# Patient Record
Sex: FEMALE | Race: WHITE | NOT HISPANIC OR LATINO | Employment: OTHER | URBAN - METROPOLITAN AREA
[De-identification: names, ages, dates, MRNs, and addresses within clinical notes are randomized per-mention and may not be internally consistent; named-entity substitution may affect disease eponyms.]

---

## 2017-02-23 ENCOUNTER — OFFICE VISIT (OUTPATIENT)
Dept: FAMILY MEDICINE | Facility: CLINIC | Age: 77
End: 2017-02-23
Payer: COMMERCIAL

## 2017-02-23 VITALS
TEMPERATURE: 96.8 F | SYSTOLIC BLOOD PRESSURE: 137 MMHG | BODY MASS INDEX: 26.37 KG/M2 | RESPIRATION RATE: 18 BRPM | DIASTOLIC BLOOD PRESSURE: 68 MMHG | WEIGHT: 168 LBS | OXYGEN SATURATION: 99 % | HEIGHT: 67 IN | HEART RATE: 93 BPM

## 2017-02-23 DIAGNOSIS — Z23 NEED FOR VACCINATION: ICD-10-CM

## 2017-02-23 DIAGNOSIS — I10 BENIGN ESSENTIAL HYPERTENSION: ICD-10-CM

## 2017-02-23 DIAGNOSIS — E78.5 HYPERLIPIDEMIA LDL GOAL <130: ICD-10-CM

## 2017-02-23 DIAGNOSIS — Z12.11 SCREENING FOR COLON CANCER: ICD-10-CM

## 2017-02-23 DIAGNOSIS — R42 DIZZINESS: ICD-10-CM

## 2017-02-23 DIAGNOSIS — R25.2 CRAMP OF LIMB: Primary | ICD-10-CM

## 2017-02-23 PROBLEM — R01.1 HEART MURMUR: Status: ACTIVE | Noted: 2017-02-23

## 2017-02-23 PROBLEM — R60.0 LOCALIZED EDEMA: Status: ACTIVE | Noted: 2017-02-23

## 2017-02-23 LAB
ALBUMIN SERPL-MCNC: 4 G/DL (ref 3.4–5)
ALP SERPL-CCNC: 99 U/L (ref 40–150)
ALT SERPL W P-5'-P-CCNC: 30 U/L (ref 0–50)
ANION GAP SERPL CALCULATED.3IONS-SCNC: 7 MMOL/L (ref 3–14)
AST SERPL W P-5'-P-CCNC: 23 U/L (ref 0–45)
BILIRUB SERPL-MCNC: 0.5 MG/DL (ref 0.2–1.3)
BUN SERPL-MCNC: 17 MG/DL (ref 7–30)
CALCIUM SERPL-MCNC: 9.4 MG/DL (ref 8.5–10.1)
CHLORIDE SERPL-SCNC: 108 MMOL/L (ref 94–109)
CHOLEST SERPL-MCNC: 193 MG/DL
CO2 SERPL-SCNC: 28 MMOL/L (ref 20–32)
CREAT SERPL-MCNC: 0.75 MG/DL (ref 0.52–1.04)
ERYTHROCYTE [DISTWIDTH] IN BLOOD BY AUTOMATED COUNT: 14.3 % (ref 10–15)
GFR SERPL CREATININE-BSD FRML MDRD: 75 ML/MIN/1.7M2
GLUCOSE SERPL-MCNC: 92 MG/DL (ref 70–99)
HCT VFR BLD AUTO: 43.2 % (ref 35–47)
HDLC SERPL-MCNC: 63 MG/DL
HGB BLD-MCNC: 14.6 G/DL (ref 11.7–15.7)
LDLC SERPL CALC-MCNC: 107 MG/DL
MCH RBC QN AUTO: 29.5 PG (ref 26.5–33)
MCHC RBC AUTO-ENTMCNC: 33.8 G/DL (ref 31.5–36.5)
MCV RBC AUTO: 87 FL (ref 78–100)
NONHDLC SERPL-MCNC: 130 MG/DL
PLATELET # BLD AUTO: 266 10E9/L (ref 150–450)
POTASSIUM SERPL-SCNC: 3.9 MMOL/L (ref 3.4–5.3)
PROT SERPL-MCNC: 7.9 G/DL (ref 6.8–8.8)
RBC # BLD AUTO: 4.95 10E12/L (ref 3.8–5.2)
SODIUM SERPL-SCNC: 143 MMOL/L (ref 133–144)
TRIGL SERPL-MCNC: 113 MG/DL
WBC # BLD AUTO: 5.5 10E9/L (ref 4–11)

## 2017-02-23 PROCEDURE — 80053 COMPREHEN METABOLIC PANEL: CPT | Performed by: INTERNAL MEDICINE

## 2017-02-23 PROCEDURE — 99213 OFFICE O/P EST LOW 20 MIN: CPT | Mod: 25 | Performed by: INTERNAL MEDICINE

## 2017-02-23 PROCEDURE — 36415 COLL VENOUS BLD VENIPUNCTURE: CPT | Performed by: INTERNAL MEDICINE

## 2017-02-23 PROCEDURE — 80061 LIPID PANEL: CPT | Performed by: INTERNAL MEDICINE

## 2017-02-23 PROCEDURE — 90670 PCV13 VACCINE IM: CPT | Performed by: INTERNAL MEDICINE

## 2017-02-23 PROCEDURE — G0009 ADMIN PNEUMOCOCCAL VACCINE: HCPCS | Performed by: INTERNAL MEDICINE

## 2017-02-23 PROCEDURE — 85027 COMPLETE CBC AUTOMATED: CPT | Performed by: INTERNAL MEDICINE

## 2017-02-23 RX ORDER — MECLIZINE HYDROCHLORIDE 25 MG/1
25 TABLET ORAL EVERY 6 HOURS PRN
Qty: 30 TABLET | Refills: 1 | Status: SHIPPED | OUTPATIENT
Start: 2017-02-23 | End: 2022-04-27

## 2017-02-23 RX ORDER — DILTIAZEM HYDROCHLORIDE 180 MG/1
CAPSULE, EXTENDED RELEASE ORAL
Refills: 0 | COMMUNITY
Start: 2017-02-06 | End: 2017-08-12

## 2017-02-23 RX ORDER — LORATADINE 10 MG/1
TABLET ORAL
Refills: 12 | COMMUNITY
Start: 2016-06-02 | End: 2022-04-27

## 2017-02-23 NOTE — NURSING NOTE
Screening Questionnaire for Adult Immunization    Are you sick today?   No   Do you have allergies to medications, food, a vaccine component or latex?   No   Have you ever had a serious reaction after receiving a vaccination?   No   Do you have a long-term health problem with heart disease, lung disease, asthma, kidney disease, metabolic disease (e.g. diabetes), anemia, or other blood disorder?   No   Do you have cancer, leukemia, HIV/AIDS, or any other immune system problem?   No   In the past 3 months, have you taken medications that affect  your immune system, such as prednisone, other steroids, or anticancer drugs; drugs for the treatment of rheumatoid arthritis, Crohn s disease, or psoriasis; or have you had radiation treatments?   No   Have you had a seizure, or a brain or other nervous system problem?   No   During the past year, have you received a transfusion of blood or blood     products, or been given immune (gamma) globulin or antiviral drug?   No   For women: Are you pregnant or is there a chance you could become        pregnant during the next month?   No   Have you received any vaccinations in the past 4 weeks?   No     Immunization questionnaire answers were all negative.      MNVFC doesn't apply on this patient    Per orders of Dr. Gupta, injection of PCV13 given by Rita Bahena. Patient instructed to remain in clinic for 20 minutes afterwards, and to report any adverse reaction to me immediately.       Screening performed by Rita Bahena on 2/23/2017 at 11:09 AM.

## 2017-02-23 NOTE — PROGRESS NOTES
SUBJECTIVE:                                                    Carolee Doherty is a 76 year old female who presents to clinic today for the following health issues:     76-year-old female who I am familiar with from a previous practice with a history of hypertension     Musculoskeletal problem/pain      Duration: ongoing    Description  Location: Muscular cramps in legs, feet and back-at bedtime    Intensity:  moderate    Accompanying signs and symptoms: none    History  Previous similar problem: no   Previous evaluation:  none    Precipitating or alleviating factors:  Trauma or overuse: no   Aggravating factors include: none    Therapies tried and outcome: nothing       Hypertension Follow-up      Outpatient blood pressures are not being checked.    Low Salt Diet: no added salt       Problem list and histories reviewed & adjusted, as indicated.  Additional history: as documented    Patient Active Problem List   Diagnosis     Benign essential hypertension     Heart murmur     Localized edema     Hyperlipidemia LDL goal <130     Past Surgical History   Procedure Laterality Date     Colonoscopy       Phacoemulsification clear cornea with standard intraocular lens implant  9/5/2012     Procedure: PHACOEMULSIFICATION CLEAR CORNEA WITH STANDARD INTRAOCULAR LENS IMPLANT;  RIGHT PHACOEMULSIFCATION CLEAR CORNEA WITH STANDARD INTRAOCULAR LENS IMPLANT ;  Surgeon: Beto Gaston MD;  Location: [facility]     Phacoemulsification clear cornea with standard intraocular lens implant  10/31/2012     Procedure: PHACOEMULSIFICATION CLEAR CORNEA WITH STANDARD INTRAOCULAR LENS IMPLANT;  LEFT PHACOEMULSIFICATION CLEAR CORNEA WITH STANDARD INTRAOCULAR LENS IMPLANT ;  Surgeon: Beto Gaston MD;  Location: [facility]       Social History   Substance Use Topics     Smoking status: Former Smoker     Smokeless tobacco: Never Used     Alcohol use No     Family History   Problem Relation Age of Onset     Family History Negative Mother      Family  "History Negative Father      DIABETES No family hx of      Coronary Artery Disease No family hx of      Hypertension No family hx of      Hyperlipidemia No family hx of      CEREBROVASCULAR DISEASE No family hx of      Breast Cancer No family hx of      Colon Cancer No family hx of      Prostate Cancer No family hx of      Other Cancer No family hx of      Depression No family hx of      Anxiety Disorder No family hx of      MENTAL ILLNESS No family hx of      Substance Abuse No family hx of      Anesthesia Reaction No family hx of      Asthma No family hx of      OSTEOPOROSIS No family hx of      Genetic Disorder No family hx of      Thyroid Disease No family hx of      Obesity No family hx of      Unknown/Adopted No family hx of          Current Outpatient Prescriptions   Medication Sig Dispense Refill     CARTIA  MG 24 hr capsule TK 1 C PO QD.  0     loratadine (CLARITIN) 10 MG tablet TK 1 T PO HS TO HELP WITH ALLERGIES AND POST NASAL DRAINAGE  12     meclizine (ANTIVERT) 25 MG tablet Take 1 tablet (25 mg) by mouth every 6 hours as needed for dizziness 30 tablet 1     Allergies   Allergen Reactions     Amlodipine Besylate Swelling     Levaquin      Strange thoughts     Septra Ds [Sulfamethoxazole W-Trimethoprim]      Felt lousy       ROS:  Constitutional, HEENT, cardiovascular, pulmonary, gi and gu systems are negative, except as otherwise noted.    OBJECTIVE:                                                    /68  Pulse 93  Temp 96.8  F (36  C) (Oral)  Resp 18  Ht 5' 6.75\" (1.695 m)  Wt 168 lb (76.2 kg)  SpO2 99%  BMI 26.51 kg/m2  Body mass index is 26.51 kg/(m^2).  GENERAL: healthy, alert and no distress  EYES: Eyes grossly normal to inspection, PERRL and conjunctivae and sclerae normal  HENT: ear canals and TM's normal, nose and mouth without ulcers or lesions  NECK: no adenopathy, no asymmetry, masses, or scars and thyroid normal to palpation  RESP: lungs clear to auscultation - no rales, rhonchi " or wheezes  CV: regular rate and rhythm, normal S1 S2, no S3 or S4, no murmur, click or rub, no peripheral edema and peripheral pulses strong  ABDOMEN: soft, nontender, no hepatosplenomegaly, no masses and bowel sounds normal  MS: no gross musculoskeletal defects noted, no edema; negative bilateral Keyla's signs  SKIN: no suspicious lesions or rashes  NEURO: Normal strength and tone, mentation intact and speech normal  PSYCH: mentation appears normal, affect normal/bright    Diagnostic Test Results:  none      ASSESSMENT/PLAN:                                                          1. Cramp of limb  These symptoms are nonspecific, there are no objective findings on physical exam, we discussed graded exercise to improve muscular strength to prevent cramping at night, exclude electrolyte derangements, discussed stretching.    2. Benign essential hypertension  Second check demonstrates good blood pressure control it has been almost 3 years since she has had labs  - Comprehensive metabolic panel  - CBC with platelets    3. Hyperlipidemia LDL goal <130    - Lipid Profile with reflex to direct LDL    4. Dizziness  Chronic intermittent mild dizziness for which she intermittently takes meclizine, she needs a refill  - meclizine (ANTIVERT) 25 MG tablet; Take 1 tablet (25 mg) by mouth every 6 hours as needed for dizziness  Dispense: 30 tablet; Refill: 1    5. Screening for colon cancer    - GASTROENTEROLOGY ADULT REF PROCEDURE ONLY      Prevnar 13 today      FUTURE APPOINTMENTS:  Pending labs and symptoms    Kristian Gupta MD  [facility]

## 2017-02-23 NOTE — LETTER
"[facility]  [address]  [address]  [place]  [address]  Tel: [phone]    February 24, 2017    Carolee Doherty  [address] [address]        Dear Ms. Doherty,      -Liver and gallbladder tests are normal for you. (ALT,AST, Alk phos, bilirubin), kidney function is normal for you (Creatinine, GFR), Sodium is normal, Potassium is normal for you, Calcium is normal for you, Glucose (blood sugar) is normal for you.      -Your total cholesterol is 193 which is at your goal of total cholesterol less than 200.     -Your triglycerides are 113 which are at your goal of triglycerides less than 150.     -Your HDL or \"good cholesterol\" is 63 which is at your goal of HDL cholesterol greater than 40.     -Your LDL cholesterol or \"bad cholesterol\" is 107 which is at your goal of LDL cholesterol less than <130.  Your LDL goal is based on your risk factors for artery disease.     -Your complete blood counts including your hemoglobin returned normal for you.       Bottom line:  Your lab results look OK       Follow up:  Schedule an appointment for a physical examination with fasting blood tests in one year's time, or return sooner if new questions, symptoms or problems arise.        If you have any further questions or problems, please contact our office.      Sincerely,    Kristian Gupta MD        Results for orders placed or performed in visit on 02/23/17   Lipid Profile with reflex to direct LDL   Result Value Ref Range    Cholesterol 193 <200 mg/dL    Triglycerides 113 <150 mg/dL    HDL Cholesterol 63 >49 mg/dL    LDL Cholesterol Calculated 107 (H) <100 mg/dL    Non HDL Cholesterol 130 (H) <130 mg/dL   Comprehensive metabolic panel   Result Value Ref Range    Sodium 143 133 - 144 mmol/L    Potassium 3.9 3.4 - 5.3 mmol/L    Chloride 108 94 - 109 mmol/L    Carbon Dioxide 28 20 - 32 mmol/L    Anion Gap 7 3 - 14 mmol/L    Glucose 92 70 - 99 mg/dL    Urea Nitrogen 17 7 - 30 mg/dL    Creatinine " 0.75 0.52 - 1.04 mg/dL    GFR Estimate 75 >60 mL/min/1.7m2    GFR Estimate If Black >90   GFR Calc   >60 mL/min/1.7m2    Calcium 9.4 8.5 - 10.1 mg/dL    Bilirubin Total 0.5 0.2 - 1.3 mg/dL    Albumin 4.0 3.4 - 5.0 g/dL    Protein Total 7.9 6.8 - 8.8 g/dL    Alkaline Phosphatase 99 40 - 150 U/L    ALT 30 0 - 50 U/L    AST 23 0 - 45 U/L   CBC with platelets   Result Value Ref Range    WBC 5.5 4.0 - 11.0 10e9/L    RBC Count 4.95 3.8 - 5.2 10e12/L    Hemoglobin 14.6 11.7 - 15.7 g/dL    Hematocrit 43.2 35.0 - 47.0 %    MCV 87 78 - 100 fl    MCH 29.5 26.5 - 33.0 pg    MCHC 33.8 31.5 - 36.5 g/dL    RDW 14.3 10.0 - 15.0 %    Platelet Count 266 150 - 450 10e9/L

## 2017-02-23 NOTE — MR AVS SNAPSHOT
After Visit Summary   2/23/2017    Carolee Doherty    MRN: [id number]           Patient Information     Date Of Birth          1940        Visit Information        Provider Department      2/23/2017 10:30 AM Kristian Gupta MD [facility]        Today's Diagnoses     Cramp of limb    -  1    Benign essential hypertension        Hyperlipidemia LDL goal <130        Dizziness        Screening for colon cancer           Follow-ups after your visit        Additional Services     GASTROENTEROLOGY ADULT REF PROCEDURE ONLY       Last Lab Result: Creatinine (mg/dL)       Date                     Value                 10/08/2013               0.78             ----------  Body mass index is 26.51 kg/(m^2).     Needed:  No  Language:  English    Patient will be contacted to schedule procedure.     Please be aware that coverage of these services is subject to the terms and limitations of your health insurance plan.  Call member services at your health plan with any benefit or coverage questions.  Any procedures must be performed at a [facility] facility OR coordinated by your clinic's referral office.    Please bring the following with you to your appointment:    (1) Any X-Rays, CTs or MRIs which have been performed.  Contact the facility where they were done to arrange for  prior to your scheduled appointment.    (2) List of current medications   (3) This referral request   (4) Any documents/labs given to you for this referral                  Who to contact     If you have questions or need follow up information about today's clinic visit or your schedule please contact [facility] directly at [phone].  Normal or non-critical lab and imaging results will be communicated to you by MyChart, letter or phone within 4 business days after the clinic has received the results. If you do not hear from us within 7 days, please contact the clinic through MyChart or phone. If you  "have a critical or abnormal lab result, we will notify you by phone as soon as possible.  Submit refill requests through [employer] or call your pharmacy and they will forward the refill request to us. Please allow 3 business days for your refill to be completed.          Additional Information About Your Visit        [employer] Information     [employer] lets you send messages to your doctor, view your test results, renew your prescriptions, schedule appointments and more. To sign up, go to [email or web address] . Click on \"Log in\" on the left side of the screen, which will take you to the Welcome page. Then click on \"Sign up Now\" on the right side of the page.     You will be asked to enter the access code listed below, as well as some personal information. Please follow the directions to create your username and password.     Your access code is: [email or web address]  Expires: 2017 11:08 AM     Your access code will  in 90 days. If you need help or a new code, please call your [facility] clinic or [phone].        Care EveryWhere ID     This is your Care EveryWhere ID. This could be used by other organizations to access your [facility] medical records  [id number]        Your Vitals Were     Pulse Temperature Respirations Height Pulse Oximetry BMI (Body Mass Index)    93 96.8  F (36  C) (Oral) 18 5' 6.75\" (1.695 m) 99% 26.51 kg/m2       Blood Pressure from Last 3 Encounters:   17 137/68   10/31/12 115/64   12 114/60    Weight from Last 3 Encounters:   17 168 lb (76.2 kg)   10/31/12 162 lb (73.5 kg)   12 158 lb (71.7 kg)              We Performed the Following     CBC with platelets     Comprehensive metabolic panel     GASTROENTEROLOGY ADULT REF PROCEDURE ONLY     Lipid Profile with reflex to direct LDL          Today's Medication Changes          These changes are accurate as of: 17 11:08 AM.  If you have any questions, ask your nurse or doctor.               Start taking these " medicines.        Dose/Directions    meclizine 25 MG tablet   Commonly known as:  ANTIVERT   Used for:  Dizziness   Started by:  Kristian Gupta MD        Dose:  25 mg   Take 1 tablet (25 mg) by mouth every 6 hours as needed for dizziness   Quantity:  30 tablet   Refills:  1            Where to get your medicines      These medications were sent to [facility] [address] [place] [address] AT [address] [place] [address] & [address] [address]    Hours:  24-hours Phone:  [phone]     meclizine 25 MG tablet                Primary Care Provider Office Phone # Fax #    Kristian Gupta [phone]       [facility] [address] [facility] [address]        Thank you!     Thank you for choosing [facility]  for your care. Our goal is always to provide you with excellent care. Hearing back from our patients is one way we can continue to improve our services. Please take a few minutes to complete the written survey that you may receive in the mail after your visit with us. Thank you!             Your Updated Medication List - Protect others around you: Learn how to safely use, store and throw away your medicines at www.disposemymeds.org.          This list is accurate as of: 2/23/17 11:08 AM.  Always use your most recent med list.                   Brand Name Dispense Instructions for use    CARTIA  MG 24 hr capsule   Generic drug:  diltiazem      TK 1 C PO QD.       loratadine 10 MG tablet    CLARITIN     TK 1 T PO HS TO HELP WITH ALLERGIES AND POST NASAL DRAINAGE       meclizine 25 MG tablet    ANTIVERT    30 tablet    Take 1 tablet (25 mg) by mouth every 6 hours as needed for dizziness

## 2017-02-23 NOTE — NURSING NOTE
"Chief Complaint   Patient presents with     Musculoskeletal Problem       Initial /80 (BP Location: Right arm, Patient Position: Right side, Cuff Size: Adult Regular)  Pulse 93  Temp 96.8  F (36  C) (Oral)  Resp 18  Ht 5' 6.75\" (1.695 m)  Wt 168 lb (76.2 kg)  SpO2 99%  BMI 26.51 kg/m2 Estimated body mass index is 26.51 kg/(m^2) as calculated from the following:    Height as of this encounter: 5' 6.75\" (1.695 m).    Weight as of this encounter: 168 lb (76.2 kg).  Medication Reconciliation: complete   Rita Bahena CMA (AAMA)      "

## 2017-02-24 NOTE — PROGRESS NOTES
"The following letter pertains to your most recent diagnostic tests:    -Liver and gallbladder tests are normal for you. (ALT,AST, Alk phos, bilirubin), kidney function is normal for you (Creatinine, GFR), Sodium is normal, Potassium is normal for you, Calcium is normal for you, Glucose (blood sugar) is normal for you.      -Your total cholesterol is 193 which is at your goal of total cholesterol less than 200.    -Your triglycerides are 113 which are at your goal of triglycerides less than 150.    -Your HDL or \"good cholesterol\" is 63 which is at your goal of HDL cholesterol greater than 40.    -Your LDL cholesterol or \"bad cholesterol\" is 107 which is at your goal of LDL cholesterol less than <130.  Your LDL goal is based on your risk factors for artery disease.    -Your complete blood counts including your hemoglobin returned normal for you.       Bottom line:  Your lab results look OK      Follow up:  Schedule an appointment for a physical examination with fasting blood tests in one year's time, or return sooner if new questions, symptoms or problems arise.       Sincerely,    Dr. Gupta  "

## 2017-04-04 ENCOUNTER — SURGERY (OUTPATIENT)
Age: 77
End: 2017-04-04

## 2017-04-04 ENCOUNTER — HOSPITAL ENCOUNTER (OUTPATIENT)
Facility: CLINIC | Age: 77
Discharge: HOME | End: 2017-04-04
Attending: COLON & RECTAL SURGERY | Admitting: COLON & RECTAL SURGERY
Payer: MEDICARE

## 2017-04-04 VITALS
BODY MASS INDEX: 25.9 KG/M2 | OXYGEN SATURATION: 89 % | HEIGHT: 67 IN | DIASTOLIC BLOOD PRESSURE: 87 MMHG | RESPIRATION RATE: 14 BRPM | WEIGHT: 165 LBS | SYSTOLIC BLOOD PRESSURE: 111 MMHG

## 2017-04-04 LAB — COLONOSCOPY: NORMAL

## 2017-04-04 PROCEDURE — G0500 MOD SEDAT ENDO SERVICE >5YRS: HCPCS | Performed by: COLON & RECTAL SURGERY

## 2017-04-04 PROCEDURE — 25000128 H RX IP 250 OP 636: Performed by: COLON & RECTAL SURGERY

## 2017-04-04 PROCEDURE — G0121 COLON CA SCRN NOT HI RSK IND: HCPCS | Performed by: COLON & RECTAL SURGERY

## 2017-04-04 PROCEDURE — 45378 DIAGNOSTIC COLONOSCOPY: CPT | Performed by: COLON & RECTAL SURGERY

## 2017-04-04 PROCEDURE — 25000125 ZZHC RX 250: Performed by: COLON & RECTAL SURGERY

## 2017-04-04 RX ORDER — FENTANYL CITRATE 50 UG/ML
INJECTION, SOLUTION INTRAMUSCULAR; INTRAVENOUS PRN
Status: DISCONTINUED | OUTPATIENT
Start: 2017-04-04 | End: 2017-04-04 | Stop reason: HOSPADM

## 2017-04-04 RX ORDER — LIDOCAINE 40 MG/G
CREAM TOPICAL
Status: DISCONTINUED | OUTPATIENT
Start: 2017-04-04 | End: 2017-04-04 | Stop reason: HOSPADM

## 2017-04-04 RX ORDER — FLUMAZENIL 0.1 MG/ML
0.2 INJECTION, SOLUTION INTRAVENOUS
Status: DISCONTINUED | OUTPATIENT
Start: 2017-04-04 | End: 2017-04-04 | Stop reason: HOSPADM

## 2017-04-04 RX ORDER — PROCHLORPERAZINE MALEATE 5 MG
5 TABLET ORAL EVERY 6 HOURS PRN
Status: DISCONTINUED | OUTPATIENT
Start: 2017-04-04 | End: 2017-04-04 | Stop reason: HOSPADM

## 2017-04-04 RX ORDER — ONDANSETRON 4 MG/1
4 TABLET, ORALLY DISINTEGRATING ORAL EVERY 6 HOURS PRN
Status: DISCONTINUED | OUTPATIENT
Start: 2017-04-04 | End: 2017-04-04 | Stop reason: HOSPADM

## 2017-04-04 RX ORDER — ONDANSETRON 2 MG/ML
4 INJECTION INTRAMUSCULAR; INTRAVENOUS EVERY 6 HOURS PRN
Status: DISCONTINUED | OUTPATIENT
Start: 2017-04-04 | End: 2017-04-04 | Stop reason: HOSPADM

## 2017-04-04 RX ORDER — METOCLOPRAMIDE HYDROCHLORIDE 5 MG/ML
5 INJECTION INTRAMUSCULAR; INTRAVENOUS EVERY 6 HOURS PRN
Status: DISCONTINUED | OUTPATIENT
Start: 2017-04-04 | End: 2017-04-04 | Stop reason: HOSPADM

## 2017-04-04 RX ORDER — NALOXONE HYDROCHLORIDE 0.4 MG/ML
.1-.4 INJECTION, SOLUTION INTRAMUSCULAR; INTRAVENOUS; SUBCUTANEOUS
Status: DISCONTINUED | OUTPATIENT
Start: 2017-04-04 | End: 2017-04-04 | Stop reason: HOSPADM

## 2017-04-04 RX ORDER — METOCLOPRAMIDE 5 MG/1
5 TABLET ORAL EVERY 6 HOURS PRN
Status: DISCONTINUED | OUTPATIENT
Start: 2017-04-04 | End: 2017-04-04 | Stop reason: HOSPADM

## 2017-04-04 RX ORDER — ONDANSETRON 2 MG/ML
4 INJECTION INTRAMUSCULAR; INTRAVENOUS
Status: DISCONTINUED | OUTPATIENT
Start: 2017-04-04 | End: 2017-04-04 | Stop reason: HOSPADM

## 2017-04-04 RX ADMIN — MIDAZOLAM HYDROCHLORIDE 2 MG: 1 INJECTION, SOLUTION INTRAMUSCULAR; INTRAVENOUS at 08:17

## 2017-04-04 RX ADMIN — FENTANYL CITRATE 50 MCG: 50 INJECTION, SOLUTION INTRAMUSCULAR; INTRAVENOUS at 08:24

## 2017-04-04 RX ADMIN — FENTANYL CITRATE 100 MCG: 50 INJECTION, SOLUTION INTRAMUSCULAR; INTRAVENOUS at 08:17

## 2017-04-04 RX ADMIN — MIDAZOLAM HYDROCHLORIDE 1 MG: 1 INJECTION, SOLUTION INTRAMUSCULAR; INTRAVENOUS at 08:23

## 2017-04-04 NOTE — H&P
Pre-Endoscopy History and Physical   Carolee Doherty MRN# [id number]   YOB: 1940 Age: 76 year old   Date of Procedure: 4/4/2017   Primary care provider: Kristian Gupta   Type of Endoscopy: colonoscopy   Reason for Procedure: screening   Type of Anesthesia Anticipated: Moderate Sedation   HPI:   Carolee is a 76 year old female for screening colonoscopy.  She last had a colonoscopy in 2007 which was normal.  She denies BRBPR, abdominal pain, nausea/vomiting, changes in bowel habits or unintentional weight loss.  She denies a FH of CRC.    Allergies   Allergen Reactions     Amlodipine Besylate Swelling     Levaquin      Strange thoughts     Septra Ds [Sulfamethoxazole W-Trimethoprim]      Felt lousy      Prior to Admission Medications   Prescriptions Last Dose Informant Patient Reported? Taking?   CARTIA  MG 24 hr capsule 4/3/2017  Yes Yes   Sig: TK 1 C PO QD.   loratadine (CLARITIN) 10 MG tablet   Yes No   Sig: TK 1 T PO HS TO HELP WITH ALLERGIES AND POST NASAL DRAINAGE   meclizine (ANTIVERT) 25 MG tablet   No No   Sig: Take 1 tablet (25 mg) by mouth every 6 hours as needed for dizziness      Facility-Administered Medications: None      Patient Active Problem List   Diagnosis     Benign essential hypertension     Heart murmur     Localized edema     Hyperlipidemia LDL goal <130      Past Medical History:   Diagnosis Date     Bronchitis      Edema      Hyperlipidemia LDL goal <130 2/23/2017     Other and unspecified hyperlipidemia      Undiagnosed cardiac murmurs      Unspecified essential hypertension       Past Surgical History:   Procedure Laterality Date     COLONOSCOPY       PHACOEMULSIFICATION CLEAR CORNEA WITH STANDARD INTRAOCULAR LENS IMPLANT  9/5/2012    Procedure: PHACOEMULSIFICATION CLEAR CORNEA WITH STANDARD INTRAOCULAR LENS IMPLANT;  RIGHT PHACOEMULSIFCATION CLEAR CORNEA WITH STANDARD INTRAOCULAR LENS IMPLANT ;  Surgeon: Beto Gaston MD;  Location: [facility]     PHACOEMULSIFICATION CLEAR  "CORNEA WITH STANDARD INTRAOCULAR LENS IMPLANT  10/31/2012    Procedure: PHACOEMULSIFICATION CLEAR CORNEA WITH STANDARD INTRAOCULAR LENS IMPLANT;  LEFT PHACOEMULSIFICATION CLEAR CORNEA WITH STANDARD INTRAOCULAR LENS IMPLANT ;  Surgeon: Beto Gaston MD;  Location: [facility]      Social History   Substance Use Topics     Smoking status: Former Smoker     Smokeless tobacco: Never Used     Alcohol use No      Family History   Problem Relation Age of Onset     Family History Negative Mother      Family History Negative Father      DIABETES No family hx of      Coronary Artery Disease No family hx of      Hypertension No family hx of      Hyperlipidemia No family hx of      CEREBROVASCULAR DISEASE No family hx of      Breast Cancer No family hx of      Colon Cancer No family hx of      Prostate Cancer No family hx of      Other Cancer No family hx of      Depression No family hx of      Anxiety Disorder No family hx of      MENTAL ILLNESS No family hx of      Substance Abuse No family hx of      Anesthesia Reaction No family hx of      Asthma No family hx of      OSTEOPOROSIS No family hx of      Genetic Disorder No family hx of      Thyroid Disease No family hx of      Obesity No family hx of      Unknown/Adopted No family hx of       PHYSICAL EXAM:   /71  Resp 14  Ht 1.702 m (5' 7\")  Wt 74.8 kg (165 lb)  SpO2 95%  BMI 25.84 kg/m2 Estimated body mass index is 25.84 kg/(m^2) as calculated from the following:    Height as of this encounter: 1.702 m (5' 7\").    Weight as of this encounter: 74.8 kg (165 lb).   RESP: lungs clear to auscultation - no rales, rhonchi or wheezes   CV: regular rates and rhythm   ASA Class 2 - Mild systemic disease    Assessment: 77 y/o woman for screening colonoscopy    Plan: Colonoscopy with moderate sedation.  Risks of the procedure were discussed including, but not limited to, bleeding, perforation and missed lesions.  Patient understands and is willing to proceed.    Yaakov Middleton MD " ....................  4/4/2017   8:09 AM  Colon and Rectal Surgery Staff  [phone]

## 2017-08-12 DIAGNOSIS — I10 BENIGN ESSENTIAL HYPERTENSION: Primary | ICD-10-CM

## 2017-08-14 RX ORDER — DILTIAZEM HYDROCHLORIDE 180 MG/1
CAPSULE, EXTENDED RELEASE ORAL
Qty: 90 CAPSULE | Refills: 0 | Status: SHIPPED | OUTPATIENT
Start: 2017-08-14 | End: 2017-11-20

## 2017-08-14 NOTE — TELEPHONE ENCOUNTER
CARTIA  MG 24 hr capsule        Last Written Prescription Date:  ?  Last Fill Quantity: ?,   # refills: ?  Last Office Visit with [facility], [facility] or [facility] prescribing provider: 2/23/2017  Future Office visit:       Routing refill request to provider for review/approval because:  Medication is reported/historical

## 2017-11-20 DIAGNOSIS — I10 BENIGN ESSENTIAL HYPERTENSION: ICD-10-CM

## 2017-11-22 RX ORDER — DILTIAZEM HYDROCHLORIDE 180 MG/1
CAPSULE, EXTENDED RELEASE ORAL
Qty: 90 CAPSULE | Refills: 0 | Status: SHIPPED | OUTPATIENT
Start: 2017-11-22 | End: 2018-02-25

## 2018-02-25 DIAGNOSIS — I10 BENIGN ESSENTIAL HYPERTENSION: ICD-10-CM

## 2018-02-26 ENCOUNTER — TRANSFERRED RECORDS (OUTPATIENT)
Dept: HEALTH INFORMATION MANAGEMENT | Facility: CLINIC | Age: 78
End: 2018-02-26

## 2018-02-26 NOTE — TELEPHONE ENCOUNTER
"Requested Prescriptions   Pending Prescriptions Disp Refills     CARTIA  MG 24 hr capsule [Pharmacy Med Name: CARTIA (DILTIAZEM) 180MGXT CAPS] 90 capsule 0     Sig: TAKE 1 CAPSULE BY MOUTH EVERY DAY.    Calcium Channel Blockers Protocol  Failed    2/25/2018 12:13 PM       Failed - Normal ALT in past 12 months    Recent Labs   Lab Test  02/23/17   1112   ALT  30            Failed - Recent or future visit with authorizing provider    Patient had office visit in the last year or has a visit in the next 30 days with authorizing provider.  See \"Patient Info\" tab in inbasket, or \"Choose Columns\" in Meds & Orders section of the refill encounter.            Failed - Normal serum creatinine on file in past 12 months    Recent Labs   Lab Test  02/23/17   1112   CR  0.75            Failed - No positive pregnancy test in past 12 months       Passed - Blood pressure under 140/90 in past 12 months    BP Readings from Last 3 Encounters:   04/04/17 111/87   02/23/17 137/68   10/31/12 115/64                Passed - Patient is age 18 or older       Passed - No active pregnancy on record          Last Written Prescription Date:  11/22/17  Last Fill Quantity: 90,  # refills: 0   Last office visit: 2/23/2017 with prescribing provider:     Future Office Visit:      Puja Hinson MA      "

## 2018-02-27 RX ORDER — DILTIAZEM HYDROCHLORIDE 180 MG/1
CAPSULE, EXTENDED RELEASE ORAL
Qty: 30 CAPSULE | Refills: 0 | Status: SHIPPED | OUTPATIENT
Start: 2018-02-27 | End: 2018-03-30

## 2018-02-27 NOTE — TELEPHONE ENCOUNTER
Routing refill request to provider for review/approval because:  Drug interaction warning    Also due for ov and labs- added into pharm comments and pended 1 month.  Please authorize if appropriate.  Thanks,  Elena Napoles RN

## 2018-03-30 DIAGNOSIS — I10 BENIGN ESSENTIAL HYPERTENSION: ICD-10-CM

## 2018-03-30 RX ORDER — DILTIAZEM HYDROCHLORIDE 180 MG/1
CAPSULE, COATED, EXTENDED RELEASE ORAL
Qty: 30 CAPSULE | Refills: 0 | Status: SHIPPED | OUTPATIENT
Start: 2018-03-30 | End: 2018-05-04

## 2018-03-30 NOTE — TELEPHONE ENCOUNTER
"Last Written Prescription Date:  2/27/2018  Last Fill Quantity: 30,  # refills: 0   Last office visit: 2/23/2017 with prescribing provider:     Future Office Visit:   Next 5 appointments (look out 90 days)     May 03, 2018 10:00 AM CDT   [facility] with [facility]pta [facility]   [facility]    [address] [facility] [id number] [phone]                   Requested Prescriptions   Pending Prescriptions Disp Refills     diltiazem (CARTIA XT) 180 MG 24 hr capsule 30 capsule 0    Calcium Channel Blockers Protocol  Failed    3/30/2018  2:46 PM       Failed - Normal ALT in past 12 months    Recent Labs   Lab Test  02/23/17   1112   ALT  30            Failed - Recent (12 mo) or future (30 days) visit within the authorizing provider's specialty    Patient had office visit in the last 12 months or has a visit in the next 30 days with authorizing provider or within the authorizing provider's specialty.  See \"Patient Info\" tab in inbasket, or \"Choose Columns\" in Meds & Orders section of the refill encounter.           Failed - Normal serum creatinine on file in past 12 months    Recent Labs   Lab Test  02/23/17   1112   CR  0.75            Passed - Blood pressure under 140/90 in past 12 months    BP Readings from Last 3 Encounters:   04/04/17 111/87   02/23/17 137/68   10/31/12 115/64                Passed - Patient is age 18 or older       Passed - No active pregnancy on record       Passed - No positive pregnancy test in past 12 months          "

## 2018-03-30 NOTE — TELEPHONE ENCOUNTER
Patient scheduled to come into clinic 5/3/18. Labs can be checked then.   Routing refill request to provider for review/approval because:  Drug interaction warning: Allergy/Contraindication: diltiazem    Mary Jo Su RN

## 2018-03-30 NOTE — TELEPHONE ENCOUNTER
Reason for Call:  Medication or medication refill:    Do you use a [facility]?    [facility] [id number] [place] [address] AT [facility]      Name of the medication requested: CARTIA  MG 24 hr capsule    Other request: pt needs refill and was wondering why it was only filled for 30 days last time, its usually a 90 day supply.    Would like some one to call her back to discuss, she only has 6 days of pills left.     Can we leave a detailed message on this number? YES    Phone number patient can be reached at: Home number on file [phone] (home)    Best Time: anytime    Call taken on 3/30/2018 at 2:40 PM by Natalee Brady

## 2018-05-03 ENCOUNTER — OFFICE VISIT (OUTPATIENT)
Dept: FAMILY MEDICINE | Facility: CLINIC | Age: 78
End: 2018-05-03
Payer: COMMERCIAL

## 2018-05-03 VITALS
HEIGHT: 67 IN | BODY MASS INDEX: 26.06 KG/M2 | DIASTOLIC BLOOD PRESSURE: 68 MMHG | HEART RATE: 70 BPM | OXYGEN SATURATION: 96 % | WEIGHT: 166 LBS | TEMPERATURE: 97.5 F | SYSTOLIC BLOOD PRESSURE: 129 MMHG

## 2018-05-03 DIAGNOSIS — Z00.00 ROUTINE GENERAL MEDICAL EXAMINATION AT A HEALTH CARE FACILITY: Primary | ICD-10-CM

## 2018-05-03 DIAGNOSIS — R01.1 HEART MURMUR: ICD-10-CM

## 2018-05-03 DIAGNOSIS — E78.5 HYPERLIPIDEMIA LDL GOAL <130: ICD-10-CM

## 2018-05-03 DIAGNOSIS — I10 BENIGN ESSENTIAL HYPERTENSION: ICD-10-CM

## 2018-05-03 DIAGNOSIS — Z23 NEED FOR VACCINATION: ICD-10-CM

## 2018-05-03 LAB
ALBUMIN SERPL-MCNC: 3.7 G/DL (ref 3.4–5)
ALP SERPL-CCNC: 99 U/L (ref 40–150)
ALT SERPL W P-5'-P-CCNC: 27 U/L (ref 0–50)
ANION GAP SERPL CALCULATED.3IONS-SCNC: 6 MMOL/L (ref 3–14)
AST SERPL W P-5'-P-CCNC: 22 U/L (ref 0–45)
BILIRUB SERPL-MCNC: 0.5 MG/DL (ref 0.2–1.3)
BUN SERPL-MCNC: 21 MG/DL (ref 7–30)
CALCIUM SERPL-MCNC: 9.3 MG/DL (ref 8.5–10.1)
CHLORIDE SERPL-SCNC: 109 MMOL/L (ref 94–109)
CHOLEST SERPL-MCNC: 208 MG/DL
CO2 SERPL-SCNC: 26 MMOL/L (ref 20–32)
CREAT SERPL-MCNC: 0.67 MG/DL (ref 0.52–1.04)
ERYTHROCYTE [DISTWIDTH] IN BLOOD BY AUTOMATED COUNT: 14.2 % (ref 10–15)
GFR SERPL CREATININE-BSD FRML MDRD: 85 ML/MIN/1.7M2
GLUCOSE SERPL-MCNC: 95 MG/DL (ref 70–99)
HCT VFR BLD AUTO: 45.8 % (ref 35–47)
HDLC SERPL-MCNC: 66 MG/DL
HGB BLD-MCNC: 15.5 G/DL (ref 11.7–15.7)
LDLC SERPL CALC-MCNC: 124 MG/DL
MCH RBC QN AUTO: 30.2 PG (ref 26.5–33)
MCHC RBC AUTO-ENTMCNC: 33.8 G/DL (ref 31.5–36.5)
MCV RBC AUTO: 89 FL (ref 78–100)
NONHDLC SERPL-MCNC: 142 MG/DL
PLATELET # BLD AUTO: 258 10E9/L (ref 150–450)
POTASSIUM SERPL-SCNC: 3.7 MMOL/L (ref 3.4–5.3)
PROT SERPL-MCNC: 8 G/DL (ref 6.8–8.8)
RBC # BLD AUTO: 5.14 10E12/L (ref 3.8–5.2)
SODIUM SERPL-SCNC: 141 MMOL/L (ref 133–144)
TRIGL SERPL-MCNC: 90 MG/DL
WBC # BLD AUTO: 6.5 10E9/L (ref 4–11)

## 2018-05-03 PROCEDURE — 36415 COLL VENOUS BLD VENIPUNCTURE: CPT | Performed by: INTERNAL MEDICINE

## 2018-05-03 PROCEDURE — 85027 COMPLETE CBC AUTOMATED: CPT | Performed by: INTERNAL MEDICINE

## 2018-05-03 PROCEDURE — 99397 PER PM REEVAL EST PAT 65+ YR: CPT | Mod: 25 | Performed by: INTERNAL MEDICINE

## 2018-05-03 PROCEDURE — 80061 LIPID PANEL: CPT | Performed by: INTERNAL MEDICINE

## 2018-05-03 PROCEDURE — 80053 COMPREHEN METABOLIC PANEL: CPT | Performed by: INTERNAL MEDICINE

## 2018-05-03 PROCEDURE — G0009 ADMIN PNEUMOCOCCAL VACCINE: HCPCS | Performed by: INTERNAL MEDICINE

## 2018-05-03 PROCEDURE — 90732 PPSV23 VACC 2 YRS+ SUBQ/IM: CPT | Performed by: INTERNAL MEDICINE

## 2018-05-03 NOTE — NURSING NOTE
Screening Questionnaire for Adult Immunization    Are you sick today?   No   Do you have allergies to medications, food, a vaccine component or latex?   No   Have you ever had a serious reaction after receiving a vaccination?   No   Do you have a long-term health problem with heart disease, lung disease, asthma, kidney disease, metabolic disease (e.g. diabetes), anemia, or other blood disorder?   No   Do you have cancer, leukemia, HIV/AIDS, or any other immune system problem?   No   In the past 3 months, have you taken medications that affect  your immune system, such as prednisone, other steroids, or anticancer drugs; drugs for the treatment of rheumatoid arthritis, Crohn s disease, or psoriasis; or have you had radiation treatments?   No   Have you had a seizure, or a brain or other nervous system problem?   No   During the past year, have you received a transfusion of blood or blood     products, or been given immune (gamma) globulin or antiviral drug?   No   For women: Are you pregnant or is there a chance you could become        pregnant during the next month?   No   Have you received any vaccinations in the past 4 weeks?   No     Immunization questionnaire answers were all negative.        Per orders of Dr. Gupta, injection of Pneumovax 23 given by Davis Pruitt. Patient instructed to remain in clinic for 15 minutes afterwards, and to report any adverse reaction to me immediately.       Screening performed by Davis Pruitt on 5/3/2018 at 1:25 PM.    Prior to injection verified patient identity using patient's name and date of birth.  Due to injection administration, patient instructed to remain in clinic for 15 minutes  afterwards, and to report any adverse reaction to me immediately.

## 2018-05-03 NOTE — LETTER
"[facility]  [address] [facility]  [address]  [place]  [address]  Tel: [phone]    May 7, 2018    Carolee Doherty  [address]   [facility] [address]        Dear Ms. Doherty,    The following letter pertains to your most recent diagnostic tests:     -Liver and gallbladder tests are normal for you. (ALT,AST, Alk phos, bilirubin), kidney function is normal for you (Creatinine, GFR), Sodium is normal, Potassium is normal for you, Calcium is normal for you, Glucose (blood sugar) is normal for you.         -Your total cholesterol is 208 which is just above your goal of total cholesterol less than 200.     -Your triglycerides are 90 which are at your goal of triglycerides less than 150.     -Your HDL or \"good cholesterol\" is 66 which is at your goal of HDL cholesterol greater than 40.     -Your LDL cholesterol or \"bad cholesterol\" is 124 which is at your goal of LDL cholesterol less than <130.  Your LDL goal is based on your risk factors for artery disease.     -Your complete blood counts including your hemoglobin returned normal for you.           Bottom line:  Your lab results look OK.  You can reduce your total and LDL cholesterol levels by eating less saturated fats.  This means you should eat less fried foods and meat.  If you eat meat, you should try to eat more chicken and fish and less beef or pork.  Also, you should increase dietary fiber intake by eating more fruits, vegetables and whole grains.  A diet high in fiber reduces total and LDL cholesterol levels.       Follow up:  Schedule an appointment for a physical examination with fasting blood tests in one year's time, or return sooner if new questions, symptoms or problems arise.       Sincerely,     Dr. Gupta/yvonne    Results for orders placed or performed in visit on 05/03/18   Comprehensive metabolic panel   Result Value Ref Range    Sodium 141 133 - 144 mmol/L    Potassium 3.7 3.4 - 5.3 mmol/L    Chloride 109 94 - 109 mmol/L    Carbon " Dioxide 26 20 - 32 mmol/L    Anion Gap 6 3 - 14 mmol/L    Glucose 95 70 - 99 mg/dL    Urea Nitrogen 21 7 - 30 mg/dL    Creatinine 0.67 0.52 - 1.04 mg/dL    GFR Estimate 85 >60 mL/min/1.7m2    GFR Estimate If Black >90 >60 mL/min/1.7m2    Calcium 9.3 8.5 - 10.1 mg/dL    Bilirubin Total 0.5 0.2 - 1.3 mg/dL    Albumin 3.7 3.4 - 5.0 g/dL    Protein Total 8.0 6.8 - 8.8 g/dL    Alkaline Phosphatase 99 40 - 150 U/L    ALT 27 0 - 50 U/L    AST 22 0 - 45 U/L   CBC with platelets   Result Value Ref Range    WBC 6.5 4.0 - 11.0 10e9/L    RBC Count 5.14 3.8 - 5.2 10e12/L    Hemoglobin 15.5 11.7 - 15.7 g/dL    Hematocrit 45.8 35.0 - 47.0 %    MCV 89 78 - 100 fl    MCH 30.2 26.5 - 33.0 pg    MCHC 33.8 31.5 - 36.5 g/dL    RDW 14.2 10.0 - 15.0 %    Platelet Count 258 150 - 450 10e9/L   Lipid panel reflex to direct LDL Non-fasting   Result Value Ref Range    Cholesterol 208 (H) <200 mg/dL    Triglycerides 90 <150 mg/dL    HDL Cholesterol 66 >49 mg/dL    LDL Cholesterol Calculated 124 (H) <100 mg/dL    Non HDL Cholesterol 142 (H) <130 mg/dL           Enclosure: Lab Results

## 2018-05-03 NOTE — MR AVS SNAPSHOT
"              After Visit Summary   5/3/2018    Carolee Doherty    MRN: [id number]           Patient Information     Date Of Birth          1940        Visit Information        Provider Department      5/3/2018 10:00 AM Kristian Gupta MD [facility]        Today's Diagnoses     Routine general medical examination at a health care facility    -  1    Benign essential hypertension        Heart murmur        Hyperlipidemia LDL goal <130          Care Instructions    You should get the new shingles vaccine \"SHINGRIX\" (not Zostavax) at your pharmacy.           Preventive Health Recommendations    Female Ages 65 +    Yearly exam:     See your health care provider every year in order to  o Review health changes.   o Discuss preventive care.    o Review your medicines if your doctor has prescribed any.      You no longer need a yearly Pap test unless you've had an abnormal Pap test in the past 10 years. If you have vaginal symptoms, such as bleeding or discharge, be sure to talk with your provider about a Pap test.      Every 1 to 2 years, have a mammogram.  If you are over 69, talk with your health care provider about whether or not you want to continue having screening mammograms.      Every 10 years, have a colonoscopy. Or, have a yearly FIT test (stool test). These exams will check for colon cancer.       Have a cholesterol test every 5 years, or more often if your doctor advises it.       Have a diabetes test (fasting glucose) every three years. If you are at risk for diabetes, you should have this test more often.       At age 65, have a bone density scan (DEXA) to check for osteoporosis (brittle bone disease).    Shots:    Get a flu shot each year.    Get a tetanus shot every 10 years.    Talk to your doctor about your pneumonia vaccines. There are now two you should receive - Pneumovax (PPSV 23) and Prevnar (PCV 13).    Talk to your doctor about the shingles vaccine.    Talk to your doctor about the " "hepatitis B vaccine.    Nutrition:     Eat at least 5 servings of fruits and vegetables each day.      Eat whole-grain bread, whole-wheat pasta and brown rice instead of white grains and rice.      Talk to your provider about Calcium and Vitamin D.     Lifestyle    Exercise at least 150 minutes a week (30 minutes a day, 5 days a week). This will help you control your weight and prevent disease.      Limit alcohol to one drink per day.      No smoking.       Wear sunscreen to prevent skin cancer.       See your dentist twice a year for an exam and cleaning.      See your eye doctor every 1 to 2 years to screen for conditions such as glaucoma, macular degeneration and cataracts.          Follow-ups after your visit        Follow-up notes from your care team     Return in about 1 year (around 5/3/2019).      Who to contact     If you have questions or need follow up information about today's clinic visit or your schedule please contact [facility] directly at [phone].  Normal or non-critical lab and imaging results will be communicated to you by [employer], letter or phone within 4 business days after the clinic has received the results. If you do not hear from us within 7 days, please contact the clinic through [employer] or phone. If you have a critical or abnormal lab result, we will notify you by phone as soon as possible.  Submit refill requests through Andel or call your pharmacy and they will forward the refill request to us. Please allow 3 business days for your refill to be completed.          Additional Information About Your Visit        [employer] Information     Andel lets you send messages to your doctor, view your test results, renew your prescriptions, schedule appointments and more. To sign up, go to [email or web address] . Click on \"Log in\" on the left side of the screen, which will take you to the Welcome page. Then click on \"Sign up Now\" on the right side of the page.     You will be " "asked to enter the access code listed below, as well as some personal information. Please follow the directions to create your username and password.     Your access code is: [email or web address]  Expires: 2018 10:33 AM     Your access code will  in 90 days. If you need help or a new code, please call your [facility] or [phone].        Care EveryWhere ID     This is your Care EveryWhere ID. This could be used by other organizations to access your [facility] medical records  [id number]        Your Vitals Were     Pulse Temperature Height Pulse Oximetry BMI (Body Mass Index)       70 97.5  F (36.4  C) (Oral) 5' 6.5\" (1.689 m) 96% 26.39 kg/m2        Blood Pressure from Last 3 Encounters:   18 129/68   17 111/87   17 137/68    Weight from Last 3 Encounters:   18 166 lb (75.3 kg)   17 165 lb (74.8 kg)   17 168 lb (76.2 kg)              We Performed the Following     CBC with platelets     Comprehensive metabolic panel     Lipid panel reflex to direct LDL Non-fasting        Primary Care Provider Office Phone # Fax #    Kristian Gupta [phone]       [facility] [address] [facility] [address]  [facility] [address]        Equal Access to Services     [facility] Center[facility]ENDA AH: Hadii aad ku hadasho Soanselmoali, waaxda luqadaha, qaybta kaalmada adejerricayada, anabelle rodriguez . So [facility] [phone].    ATENCIÓN: Si habla español, tiene a wilburn disposición servicios gratuitos de asistencia lingüística. Llame al [phone].    We comply with applicable federal civil rights laws and [place] laws. We do not discriminate on the basis of race, color, national origin, age, disability, sex, sexual orientation, or gender identity.            Thank you!     Thank you for choosing [facility]  for your care. Our goal is always to provide you with excellent care. Hearing back from our patients is one way we can continue to improve our services. Please " take a few minutes to complete the written survey that you may receive in the mail after your visit with us. Thank you!             Your Updated Medication List - Protect others around you: Learn how to safely use, store and throw away your medicines at www.disposemymeds.org.          This list is accurate as of 5/3/18 10:33 AM.  Always use your most recent med list.                   Brand Name Dispense Instructions for use Diagnosis    diltiazem 180 MG 24 hr capsule    CARTIA XT    30 capsule    TAKE 1 CAPSULE BY MOUTH EVERY DAY.    Benign essential hypertension       loratadine 10 MG tablet    CLARITIN     TK 1 T PO HS TO HELP WITH ALLERGIES AND POST NASAL DRAINAGE        meclizine 25 MG tablet    ANTIVERT    30 tablet    Take 1 tablet (25 mg) by mouth every 6 hours as needed for dizziness    Dizziness

## 2018-05-03 NOTE — PATIENT INSTRUCTIONS
"You should get the new shingles vaccine \"SHINGRIX\" (not Zostavax) at your pharmacy.           Preventive Health Recommendations    Female Ages 65 +    Yearly exam:     See your health care provider every year in order to  o Review health changes.   o Discuss preventive care.    o Review your medicines if your doctor has prescribed any.      You no longer need a yearly Pap test unless you've had an abnormal Pap test in the past 10 years. If you have vaginal symptoms, such as bleeding or discharge, be sure to talk with your provider about a Pap test.      Every 1 to 2 years, have a mammogram.  If you are over 69, talk with your health care provider about whether or not you want to continue having screening mammograms.      Every 10 years, have a colonoscopy. Or, have a yearly FIT test (stool test). These exams will check for colon cancer.       Have a cholesterol test every 5 years, or more often if your doctor advises it.       Have a diabetes test (fasting glucose) every three years. If you are at risk for diabetes, you should have this test more often.       At age 65, have a bone density scan (DEXA) to check for osteoporosis (brittle bone disease).    Shots:    Get a flu shot each year.    Get a tetanus shot every 10 years.    Talk to your doctor about your pneumonia vaccines. There are now two you should receive - Pneumovax (PPSV 23) and Prevnar (PCV 13).    Talk to your doctor about the shingles vaccine.    Talk to your doctor about the hepatitis B vaccine.    Nutrition:     Eat at least 5 servings of fruits and vegetables each day.      Eat whole-grain bread, whole-wheat pasta and brown rice instead of white grains and rice.      Talk to your provider about Calcium and Vitamin D.     Lifestyle    Exercise at least 150 minutes a week (30 minutes a day, 5 days a week). This will help you control your weight and prevent disease.      Limit alcohol to one drink per day.      No smoking.       Wear sunscreen to " prevent skin cancer.       See your dentist twice a year for an exam and cleaning.      See your eye doctor every 1 to 2 years to screen for conditions such as glaucoma, macular degeneration and cataracts.

## 2018-05-03 NOTE — PROGRESS NOTES
SUBJECTIVE:   Carolee Doherty is a 77 year old female who presents for Preventive Visit.    Are you in the first 12 months of your Medicare Part B coverage?  No    Healthy Habits:    Do you get at least three servings of calcium containing foods daily (dairy, green leafy vegetables, etc.)? yes    Amount of exercise or daily activities, outside of work: 2 day(s) per week    Problems taking medications regularly No    Medication side effects: No    Have you had an eye exam in the past two years? yes    Do you see a dentist twice per year? yes    Do you have sleep apnea, excessive snoring or daytime drowsiness?no      Ability to successfully perform activities of daily living: Yes, no assistance needed    Home safety:  none identified     Hearing impairment: No    Fall risk:  Fallen 2 or more times in the past year?: No  Any fall with injury in the past year?: No      COGNITIVE SCREEN  1) Repeat 3 items (Banana, Sunrise, Chair)    2) Clock draw: NORMAL  3) 3 item recall: Recalls 3 objects  Results: 3 items recalled: COGNITIVE IMPAIRMENT LESS LIKELY    Mini-CogTM Copyright AYAD Vargas. Licensed by the author for use in [facility]; reprinted with permission (somarybeth@[facility]). All rights reserved.        Reviewed and updated as needed this visit by clinical staff  Tobacco  Allergies  Meds  Med Hx  Surg Hx  Fam Hx  Soc Hx        Reviewed and updated as needed this visit by Provider  Tobacco  Med Hx  Surg Hx  Fam Hx  Soc Hx       Social History   Substance Use Topics     Smoking status: Former Smoker     Smokeless tobacco: Never Used     Alcohol use No       If you drink alcohol do you typically have >3 drinks per day or >7 drinks per week? No                        Today's PHQ-2 Score:   PHQ-2 ( 1999 [employer]) 5/3/2018 2/23/2017   Q1: Little interest or pleasure in doing things 0 0   Q2: Feeling down, depressed or hopeless 0 0   PHQ-2 Score 0 0       Do you feel safe in your environment - Yes    Do you  have a Health Care Directive?: Yes: Patient states has Advance Directive and will bring in a copy to clinic.    Current providers sharing in care for this patient include:   Patient Care Team:  Kristian Gupta MD as PCP - General (Internal Medicine)    The following health maintenance items are reviewed in Epic and correct as of today:  Health Maintenance   Topic Date Due     ADVANCE DIRECTIVE PLANNING Q5 YRS  08/09/1997     DEXA SCAN SCREENING (SYSTEM ASSIGNED)  08/09/2007     FALL RISK ASSESSMENT  02/23/2018     PNEUMOCOCCAL (2 of 2 - PPSV23) 02/23/2018     LIPID SCREEN Q5 YR FEMALE (SYSTEM ASSIGNED)  02/23/2022     TETANUS IMMUNIZATION (SYSTEM ASSIGNED)  01/04/2026     COLON CANCER SCREEN (SYSTEM ASSIGNED)  04/04/2027     INFLUENZA VACCINE  Completed     Patient Active Problem List   Diagnosis     Benign essential hypertension     Heart murmur     Localized edema     Hyperlipidemia LDL goal <130     Past Surgical History:   Procedure Laterality Date     COLONOSCOPY       COLONOSCOPY N/A 4/4/2017    Procedure: COLONOSCOPY;  Surgeon: Yaakov Middleton MD;  Location:  GI     PHACOEMULSIFICATION CLEAR CORNEA WITH STANDARD INTRAOCULAR LENS IMPLANT  9/5/2012    Procedure: PHACOEMULSIFICATION CLEAR CORNEA WITH STANDARD INTRAOCULAR LENS IMPLANT;  RIGHT PHACOEMULSIFCATION CLEAR CORNEA WITH STANDARD INTRAOCULAR LENS IMPLANT ;  Surgeon: Beto Gaston MD;  Location:  [facility]     PHACOEMULSIFICATION CLEAR CORNEA WITH STANDARD INTRAOCULAR LENS IMPLANT  10/31/2012    Procedure: PHACOEMULSIFICATION CLEAR CORNEA WITH STANDARD INTRAOCULAR LENS IMPLANT;  LEFT PHACOEMULSIFICATION CLEAR CORNEA WITH STANDARD INTRAOCULAR LENS IMPLANT ;  Surgeon: Beto Gaston MD;  Location: [facility]       Social History   Substance Use Topics     Smoking status: Former Smoker     Smokeless tobacco: Never Used     Alcohol use No     Family History   Problem Relation Age of Onset     Family History Negative Mother      Family History Negative Father   "    DIABETES No family hx of      Coronary Artery Disease No family hx of      Hypertension No family hx of      Hyperlipidemia No family hx of      CEREBROVASCULAR DISEASE No family hx of      Breast Cancer No family hx of      Colon Cancer No family hx of      Prostate Cancer No family hx of      Other Cancer No family hx of      Depression No family hx of      Anxiety Disorder No family hx of      MENTAL ILLNESS No family hx of      Substance Abuse No family hx of      Anesthesia Reaction No family hx of      Asthma No family hx of      OSTEOPOROSIS No family hx of      Genetic Disorder No family hx of      Thyroid Disease No family hx of      Obesity No family hx of      Unknown/Adopted No family hx of          Current Outpatient Prescriptions   Medication Sig Dispense Refill     diltiazem (CARTIA XT) 180 MG 24 hr capsule TAKE 1 CAPSULE BY MOUTH EVERY DAY. 30 capsule 0     loratadine (CLARITIN) 10 MG tablet TK 1 T PO HS TO HELP WITH ALLERGIES AND POST NASAL DRAINAGE  12     meclizine (ANTIVERT) 25 MG tablet Take 1 tablet (25 mg) by mouth every 6 hours as needed for dizziness 30 tablet 1     Allergies   Allergen Reactions     Amlodipine Besylate Swelling     Levaquin      Strange thoughts     Septra Ds [Sulfamethoxazole W-Trimethoprim]      Felt lousy         ROS:  Constitutional, HEENT, cardiovascular, pulmonary, gi and gu systems are negative, except as otherwise noted.    OBJECTIVE:   /68 (BP Location: Right arm, Patient Position: Sitting, Cuff Size: Adult Regular)  Pulse 70  Temp 97.5  F (36.4  C) (Oral)  Ht 5' 6.5\" (1.689 m)  Wt 166 lb (75.3 kg)  SpO2 96%  BMI 26.39 kg/m2 Estimated body mass index is 26.39 kg/(m^2) as calculated from the following:    Height as of this encounter: 5' 6.5\" (1.689 m).    Weight as of this encounter: 166 lb (75.3 kg).  EXAM:   GENERAL APPEARANCE: healthy, alert and no distress  EYES: Eyes grossly normal to inspection, PERRL and conjunctivae and sclerae normal  HENT: " "ear canals and TM's normal, nose and mouth without ulcers or lesions, oropharynx clear and oral mucous membranes moist  NECK: no adenopathy, no asymmetry, masses, or scars and thyroid normal to palpation  RESP: lungs clear to auscultation - no rales, rhonchi or wheezes  BREAST: normal without masses, tenderness or nipple discharge and no palpable axillary masses or adenopathy  CV: regular rate and rhythm, normal S1 S2, no S3 or S4, no murmur, click or rub, no peripheral edema and peripheral pulses strong  ABDOMEN: soft, nontender, no hepatosplenomegaly, no masses and bowel sounds normal  MS: no musculoskeletal defects are noted and gait is age appropriate without ataxia  SKIN: no suspicious lesions or rashes  NEURO: Normal strength and tone, sensory exam grossly normal, mentation intact and speech normal  PSYCH: mentation appears normal and affect normal/bright  Kady female MA present for breast exam    ASSESSMENT / PLAN:   1. Routine general medical examination at a health care facility      2. Benign essential hypertension  Well controlled       4. Hyperlipidemia LDL goal <130    - Comprehensive metabolic panel  - CBC with platelets  - Lipid panel reflex to direct LDL Non-fasting    End of Life Planning:  Patient currently has an advanced directive: Yes.  Practitioner is supportive of decision.    COUNSELING:  Reviewed preventive health counseling, as reflected in patient instructions  Special attention given to:       Regular exercise       Healthy diet/nutrition       Immunizations    Recommended P23 today and shingrix at pharmacy      Estimated body mass index is 26.39 kg/(m^2) as calculated from the following:    Height as of this encounter: 5' 6.5\" (1.689 m).    Weight as of this encounter: 166 lb (75.3 kg).  Weight management plan: Discussed healthy diet and exercise guidelines and patient will follow up in 12 months in clinic to re-evaluate.     reports that she has quit smoking. She has never used " smokeless tobacco.      Appropriate preventive services were discussed with this patient, including applicable screening as appropriate for cardiovascular disease, diabetes, osteopenia/osteoporosis, and glaucoma.  As appropriate for age/gender, discussed screening for colorectal cancer, prostate cancer, breast cancer, and cervical cancer. Checklist reviewing preventive services available has been given to the patient.    Reviewed patients plan of care and provided an AVS. The Basic Care Plan (routine screening as documented in Health Maintenance) for Carolee meets the Care Plan requirement. This Care Plan has been established and reviewed with the Patient.    Counseling Resources:  ATP IV Guidelines  Pooled Cohorts Equation Calculator  Breast Cancer Risk Calculator  FRAX Risk Assessment  ICSI Preventive Guidelines  Dietary Guidelines for Americans, 2010  USDA's MyPlate  ASA Prophylaxis  Lung CA Screening    Kristian Gupta MD  [facility]

## 2018-05-03 NOTE — NURSING NOTE
"Chief Complaint   Patient presents with     Wellness Visit       Initial /68 (BP Location: Right arm, Patient Position: Sitting, Cuff Size: Adult Regular)  Pulse 70  Temp 97.5  F (36.4  C) (Oral)  Ht 5' 6.5\" (1.689 m)  Wt 166 lb (75.3 kg)  SpO2 96%  BMI 26.39 kg/m2 Estimated body mass index is 26.39 kg/(m^2) as calculated from the following:    Height as of this encounter: 5' 6.5\" (1.689 m).    Weight as of this encounter: 166 lb (75.3 kg).  Medication Reconciliation: complete    Davis Pruitt CMA on 5/3/2018 at 9:49 AM    "

## 2018-05-04 DIAGNOSIS — I10 BENIGN ESSENTIAL HYPERTENSION: ICD-10-CM

## 2018-05-05 NOTE — PROGRESS NOTES
"The following letter pertains to your most recent diagnostic tests:    -Liver and gallbladder tests are normal for you. (ALT,AST, Alk phos, bilirubin), kidney function is normal for you (Creatinine, GFR), Sodium is normal, Potassium is normal for you, Calcium is normal for you, Glucose (blood sugar) is normal for you.        -Your total cholesterol is 208 which is just above your goal of total cholesterol less than 200.    -Your triglycerides are 90 which are at your goal of triglycerides less than 150.    -Your HDL or \"good cholesterol\" is 66 which is at your goal of HDL cholesterol greater than 40.    -Your LDL cholesterol or \"bad cholesterol\" is 124 which is at your goal of LDL cholesterol less than <130.  Your LDL goal is based on your risk factors for artery disease.     -Your complete blood counts including your hemoglobin returned normal for you.           Bottom line:  Your lab results look OK.  You can reduce your total and LDL cholesterol levels by eating less saturated fats.  This means you should eat less fried foods and meat.  If you eat meat, you should try to eat more chicken and fish and less beef or pork.  Also, you should increase dietary fiber intake by eating more fruits, vegetables and whole grains.  A diet high in fiber reduces total and LDL cholesterol levels.       Follow up:  Schedule an appointment for a physical examination with fasting blood tests in one year's time, or return sooner if new questions, symptoms or problems arise.       Sincerely,    Dr. Gupta  "

## 2018-05-05 NOTE — TELEPHONE ENCOUNTER
"Requested Prescriptions   Pending Prescriptions Disp Refills     CARTIA  MG 24 hr capsule [Pharmacy Med Name: CARTIA (DILTIAZEM) 180MGXT CAPS]  Last Written Prescription Date:  3/30/2018  Last Fill Quantity: 30 capsule,  # refills: 0   Last office visit: 5/3/2018 with prescribing provider:  Alonso   Future Office Visit:     30 capsule 0     Sig: TAKE 1 CAPSULE BY MOUTH EVERY DAY    Calcium Channel Blockers Protocol  Passed    5/4/2018  5:04 PM       Passed - Blood pressure under 140/90 in past 12 months    BP Readings from Last 3 Encounters:   05/03/18 129/68   04/04/17 111/87   02/23/17 137/68                Passed - Normal ALT in past 12 months    Recent Labs   Lab Test  05/03/18   1047   ALT  27            Passed - Recent (12 mo) or future (30 days) visit within the authorizing provider's specialty    Patient had office visit in the last 12 months or has a visit in the next 30 days with authorizing provider or within the authorizing provider's specialty.  See \"Patient Info\" tab in inbasket, or \"Choose Columns\" in Meds & Orders section of the refill encounter.           Passed - Patient is age 18 or older       Passed - No active pregnancy on record       Passed - Normal serum creatinine on file in past 12 months    Recent Labs   Lab Test  05/03/18   1047   CR  0.67            Passed - No positive pregnancy test in past 12 months        "

## 2018-05-07 RX ORDER — DILTIAZEM HYDROCHLORIDE 180 MG/1
CAPSULE, EXTENDED RELEASE ORAL
Qty: 90 CAPSULE | Refills: 3 | Status: SHIPPED | OUTPATIENT
Start: 2018-05-07 | End: 2019-05-20

## 2018-05-07 NOTE — TELEPHONE ENCOUNTER
Routing refill request to provider for review/approval because:  Drug interaction warning  Please authorize if appropriate.  Thanks,  Elena Napoles RN

## 2018-05-21 ENCOUNTER — OFFICE VISIT (OUTPATIENT)
Dept: FAMILY MEDICINE | Facility: CLINIC | Age: 78
End: 2018-05-21
Payer: COMMERCIAL

## 2018-05-21 VITALS
BODY MASS INDEX: 26.21 KG/M2 | OXYGEN SATURATION: 96 % | WEIGHT: 167 LBS | TEMPERATURE: 97.7 F | HEART RATE: 89 BPM | SYSTOLIC BLOOD PRESSURE: 148 MMHG | DIASTOLIC BLOOD PRESSURE: 84 MMHG | HEIGHT: 67 IN

## 2018-05-21 DIAGNOSIS — B02.9 HERPES ZOSTER WITHOUT COMPLICATION: Primary | ICD-10-CM

## 2018-05-21 PROCEDURE — 99213 OFFICE O/P EST LOW 20 MIN: CPT | Performed by: NURSE PRACTITIONER

## 2018-05-21 RX ORDER — NAPROXEN 500 MG/1
500 TABLET ORAL 2 TIMES DAILY PRN
Qty: 30 TABLET | Refills: 1 | Status: SHIPPED | OUTPATIENT
Start: 2018-05-21 | End: 2019-06-13

## 2018-05-21 RX ORDER — VALACYCLOVIR HYDROCHLORIDE 1 G/1
1000 TABLET, FILM COATED ORAL 3 TIMES DAILY
Qty: 21 TABLET | Refills: 0 | Status: SHIPPED | OUTPATIENT
Start: 2018-05-21 | End: 2019-06-13

## 2018-05-21 RX ORDER — GABAPENTIN 300 MG/1
300 CAPSULE ORAL
Qty: 30 CAPSULE | Refills: 1 | Status: SHIPPED | OUTPATIENT
Start: 2018-05-21 | End: 2019-06-13

## 2018-05-21 NOTE — MR AVS SNAPSHOT
After Visit Summary   5/21/2018    Carolee Doherty    MRN: [id number]           Patient Information     Date Of Birth          1940        Visit Information        Provider Department      5/21/2018 9:30 AM Radha Turner APRN [facility]        Today's Diagnoses     Herpes zoster without complication    -  1      Care Instructions      Shingles  Shingles is a viral infection caused by the same virus as chicken pox. Anyone who has had chicken pox may get shingles later in life. The virus stays in the body, but remains dormant (asleep). Shingles often occurs in older persons or persons with lowered immunity. But it can affect anyone at any age.  Shingles starts as a tingling patch of skin on one side of the body. Small, painful blisters may then appear. The rash does not spread to the rest of the body.  Exposure to shingles cannot cause shingles. However, it can cause chicken pox in anyone who has not had chicken pox or has not been vaccinated. The contagious period ends when all blisters have crusted over (generally about 2 weeks after the illness begins).  After the blisters heal, the affected skin may be sensitive or painful for months (neuralgia). This often gradually goes away.  A shingles vaccine is available. This can help prevent shingles or make it less painful. It is generally recommended for adults over the age of 60 who have had chicken pox in the past, but who have never had shingles. Adults over 60 who have had neither chicken pox nor shingles can prevent both diseases with the chicken pox vaccine. Ask your healthcare provider about these vaccines.  Home care    Medicines may be prescribed to help relieve pain. Take these medicines as directed. Ask your healthcare provider or pharmacist before using over-the-counter medicines for helping treat pain and itching.    In certain cases, antiviral medicines may be prescribed to reduce pain, shorten the illness, and prevent  neuralgia. Take these medicines as directed.    Compresses made from a solution of cool water mixed with cornstarch or baking soda may help relieve pain and itching.     Gently wash skin daily with soap and water to help prevent infection.  Be certain to rinse off all of the soap, which can be irritating.    Trim fingernails and try not to scratch. Scratching the sores may leave scars.    Stay home from work or school until all blisters have formed a crust and you are no longer contagious.  Follow-up care  Follow up with your healthcare provider or as directed by our staff.  When to seek medical advice    Fever of 100.4 F (38 C) or higher, or as directed by your healthcare provider    Affected skin is on the face or neck and any of the following occur:  ? Headache  ? Eye pain  ? Changes in vision  ? Sores near the eye  ? Weakness of facial muscles    Pain, redness, or swelling of a joint    Signs of skin infection: colored drainage from the sores, warmth, increasing redness, or increasing pain  Date Last Reviewed: 9/25/2015 2000-2017 [employer]. [address] [place] [address]. All rights reserved. This information is not intended as a substitute for professional medical care. Always follow your healthcare professional's instructions.                Follow-ups after your visit        Your next 10 appointments already scheduled     May 21, 2018  9:30 AM CDT   [facility] C[facility]    [address] [facility] [address]   [phone]           Bring a current list of meds and any records pertaining to this visit. For Physicals, please bring immunization records and any forms needing to be filled out. Please arrive 10 minutes early to complete paperwork.              Who to contact     If you have questions or need follow up information about today's clinic visit or your schedule please contact [facility]  "[facility] directly at [phone].  Normal or non-critical lab and imaging results will be communicated to you by MyChart, letter or phone within 4 business days after the clinic has received the results. If you do not hear from us within 7 days, please contact the clinic through [employer] or phone. If you have a critical or abnormal lab result, we will notify you by phone as soon as possible.  Submit refill requests through [employer] or call your pharmacy and they will forward the refill request to us. Please allow 3 business days for your refill to be completed.          Additional Information About Your Visit        [employer] Information     [employer] lets you send messages to your doctor, view your test results, renew your prescriptions, schedule appointments and more. To sign up, go to [email or web address] . Click on \"Log in\" on the left side of the screen, which will take you to the Welcome page. Then click on \"Sign up Now\" on the right side of the page.     You will be asked to enter the access code listed below, as well as some personal information. Please follow the directions to create your username and password.     Your access code is: [email or web address]  Expires: 2018 10:33 AM     Your access code will  in 90 days. If you need help or a new code, please call your [place] clinic or [phone].        Care EveryWhere ID     This is your Care EveryWhere ID. This could be used by other organizations to access your [place] medical records  [id number]        Your Vitals Were     Pulse Temperature Height Pulse Oximetry BMI (Body Mass Index)       89 97.7  F (36.5  C) (Oral) 5' 6.5\" (1.689 m) 96% 26.55 kg/m2        Blood Pressure from Last 3 Encounters:   18 148/84   18 129/68   17 111/87    Weight from Last 3 Encounters:   18 167 lb (75.8 kg)   18 166 lb (75.3 kg)   17 165 lb (74.8 kg)              Today, you had the following     No orders found for display       "   Today's Medication Changes          These changes are accurate as of 5/21/18  8:42 AM.  If you have any questions, ask your nurse or doctor.               Start taking these medicines.        Dose/Directions    gabapentin 300 MG capsule   Commonly known as:  NEURONTIN   Used for:  Herpes zoster without complication        Dose:  300 mg   Take 1 capsule (300 mg) by mouth nightly as needed   Quantity:  30 capsule   Refills:  1       naproxen 500 MG tablet   Commonly known as:  NAPROSYN   Used for:  Herpes zoster without complication        Dose:  500 mg   Take 1 tablet (500 mg) by mouth 2 times daily as needed for moderate pain   Quantity:  30 tablet   Refills:  1       valACYclovir 1000 mg tablet   Commonly known as:  VALTREX   Used for:  Herpes zoster without complication        Dose:  1000 mg   Take 1 tablet (1,000 mg) by mouth 3 times daily   Quantity:  21 tablet   Refills:  0            Where to get your medicines      These medications were sent to [facility] [address] [place] [address] AT [address] [address]    Hours:  24-hours Phone:  [phone]     gabapentin 300 MG capsule    naproxen 500 MG tablet    valACYclovir 1000 mg tablet                Primary Care Provider Office Phone # Fax #    Kristian Gupta [phone] [address] [address]        Equal Access to Services     ARNAUD CISSE : Charlie meyerso Soomaali, waaxda luqadaha, qaybta kaalmada adeegyada, anabelle peguero. So [facility] [phone].    ATENCIÓN: Si habla español, tiene a wilburn disposición servicios gratuitos de asistencia lingüística. Cale al [phone].    We comply with applicable federal civil rights laws and [place] laws. We do not discriminate on the basis of race, color, national origin, age, disability, sex, sexual orientation, or gender identity.            Thank you!     Thank you for choosing [place]  [facility]  for your care. Our goal is always to provide you with excellent care. Hearing back from our patients is one way we can continue to improve our services. Please take a few minutes to complete the written survey that you may receive in the mail after your visit with us. Thank you!             Your Updated Medication List - Protect others around you: Learn how to safely use, store and throw away your medicines at www.disposemymeds.org.          This list is accurate as of 5/21/18  8:42 AM.  Always use your most recent med list.                   Brand Name Dispense Instructions for use Diagnosis    CARTIA  MG 24 hr capsule   Generic drug:  diltiazem     90 capsule    TAKE 1 CAPSULE BY MOUTH EVERY DAY    Benign essential hypertension       gabapentin 300 MG capsule    NEURONTIN    30 capsule    Take 1 capsule (300 mg) by mouth nightly as needed    Herpes zoster without complication       loratadine 10 MG tablet    CLARITIN     TK 1 T PO HS TO HELP WITH ALLERGIES AND POST NASAL DRAINAGE        meclizine 25 MG tablet    ANTIVERT    30 tablet    Take 1 tablet (25 mg) by mouth every 6 hours as needed for dizziness    Dizziness       naproxen 500 MG tablet    NAPROSYN    30 tablet    Take 1 tablet (500 mg) by mouth 2 times daily as needed for moderate pain    Herpes zoster without complication       valACYclovir 1000 mg tablet    VALTREX    21 tablet    Take 1 tablet (1,000 mg) by mouth 3 times daily    Herpes zoster without complication

## 2018-05-21 NOTE — PATIENT INSTRUCTIONS
Shingles  Shingles is a viral infection caused by the same virus as chicken pox. Anyone who has had chicken pox may get shingles later in life. The virus stays in the body, but remains dormant (asleep). Shingles often occurs in older persons or persons with lowered immunity. But it can affect anyone at any age.  Shingles starts as a tingling patch of skin on one side of the body. Small, painful blisters may then appear. The rash does not spread to the rest of the body.  Exposure to shingles cannot cause shingles. However, it can cause chicken pox in anyone who has not had chicken pox or has not been vaccinated. The contagious period ends when all blisters have crusted over (generally about 2 weeks after the illness begins).  After the blisters heal, the affected skin may be sensitive or painful for months (neuralgia). This often gradually goes away.  A shingles vaccine is available. This can help prevent shingles or make it less painful. It is generally recommended for adults over the age of 60 who have had chicken pox in the past, but who have never had shingles. Adults over 60 who have had neither chicken pox nor shingles can prevent both diseases with the chicken pox vaccine. Ask your healthcare provider about these vaccines.  Home care    Medicines may be prescribed to help relieve pain. Take these medicines as directed. Ask your healthcare provider or pharmacist before using over-the-counter medicines for helping treat pain and itching.    In certain cases, antiviral medicines may be prescribed to reduce pain, shorten the illness, and prevent neuralgia. Take these medicines as directed.    Compresses made from a solution of cool water mixed with cornstarch or baking soda may help relieve pain and itching.     Gently wash skin daily with soap and water to help prevent infection.  Be certain to rinse off all of the soap, which can be irritating.    Trim fingernails and try not to scratch. Scratching the sores  may leave scars.    Stay home from work or school until all blisters have formed a crust and you are no longer contagious.  Follow-up care  Follow up with your healthcare provider or as directed by our staff.  When to seek medical advice    Fever of 100.4 F (38 C) or higher, or as directed by your healthcare provider    Affected skin is on the face or neck and any of the following occur:  ? Headache  ? Eye pain  ? Changes in vision  ? Sores near the eye  ? Weakness of facial muscles    Pain, redness, or swelling of a joint    Signs of skin infection: colored drainage from the sores, warmth, increasing redness, or increasing pain  Date Last Reviewed: 9/25/2015 2000-2017 [employer] [address] [place] [address], [place] [address]. All rights reserved. This information is not intended as a substitute for professional medical care. Always follow your healthcare professional's instructions.

## 2018-05-21 NOTE — PROGRESS NOTES
SUBJECTIVE:   Carolee Doherty is a 77 year old female who presents to clinic today for the following health issues:      Shingles      Duration: 3-4 days    Description (location/character/radiation): left lower abdomen and back    Intensity:  moderate, severe;  States level 1-2/10 pain    Accompanying signs and symptoms: blisters, redness, pain    History (similar episodes/previous evaluation): had shingles vaccine     Precipitating or alleviating factors:none    Therapies tried and outcome: A and D ointment     Problem list and histories reviewed & adjusted, as indicated.  Additional history: as documented    Patient Active Problem List   Diagnosis     Benign essential hypertension     Heart murmur     Localized edema     Hyperlipidemia LDL goal <130     Past Surgical History:   Procedure Laterality Date     COLONOSCOPY       COLONOSCOPY N/A 4/4/2017    Procedure: COLONOSCOPY;  Surgeon: Yaakov Middleton MD;  Location:  GI     PHACOEMULSIFICATION CLEAR CORNEA WITH STANDARD INTRAOCULAR LENS IMPLANT  9/5/2012    Procedure: PHACOEMULSIFICATION CLEAR CORNEA WITH STANDARD INTRAOCULAR LENS IMPLANT;  RIGHT PHACOEMULSIFCATION CLEAR CORNEA WITH STANDARD INTRAOCULAR LENS IMPLANT ;  Surgeon: Beto Gaston MD;  Location:  [facility]     PHACOEMULSIFICATION CLEAR CORNEA WITH STANDARD INTRAOCULAR LENS IMPLANT  10/31/2012    Procedure: PHACOEMULSIFICATION CLEAR CORNEA WITH STANDARD INTRAOCULAR LENS IMPLANT;  LEFT PHACOEMULSIFICATION CLEAR CORNEA WITH STANDARD INTRAOCULAR LENS IMPLANT ;  Surgeon: Beto Gaston MD;  Location: [facility]       Social History   Substance Use Topics     Smoking status: Former Smoker     Smokeless tobacco: Never Used     Alcohol use No     Family History   Problem Relation Age of Onset     Family History Negative Mother      Family History Negative Father      DIABETES No family hx of      Coronary Artery Disease No family hx of      Hypertension No family hx of      Hyperlipidemia No family hx of       "CEREBROVASCULAR DISEASE No family hx of      Breast Cancer No family hx of      Colon Cancer No family hx of      Prostate Cancer No family hx of      Other Cancer No family hx of      Depression No family hx of      Anxiety Disorder No family hx of      MENTAL ILLNESS No family hx of      Substance Abuse No family hx of      Anesthesia Reaction No family hx of      Asthma No family hx of      OSTEOPOROSIS No family hx of      Genetic Disorder No family hx of      Thyroid Disease No family hx of      Obesity No family hx of      Unknown/Adopted No family hx of          Current Outpatient Prescriptions   Medication Sig Dispense Refill     CARTIA  MG 24 hr capsule TAKE 1 CAPSULE BY MOUTH EVERY DAY 90 capsule 3     gabapentin (NEURONTIN) 300 MG capsule Take 1 capsule (300 mg) by mouth nightly as needed 30 capsule 1     loratadine (CLARITIN) 10 MG tablet TK 1 T PO HS TO HELP WITH ALLERGIES AND POST NASAL DRAINAGE  12     meclizine (ANTIVERT) 25 MG tablet Take 1 tablet (25 mg) by mouth every 6 hours as needed for dizziness 30 tablet 1     naproxen (NAPROSYN) 500 MG tablet Take 1 tablet (500 mg) by mouth 2 times daily as needed for moderate pain 30 tablet 1     valACYclovir (VALTREX) 1000 mg tablet Take 1 tablet (1,000 mg) by mouth 3 times daily 21 tablet 0     Allergies   Allergen Reactions     Amlodipine Besylate Swelling     Levaquin      Strange thoughts     Septra Ds [Sulfamethoxazole W-Trimethoprim]      [place] lousy       Reviewed and updated as needed this visit by clinical staff       Reviewed and updated as needed this visit by Provider         ROS:  Constitutional, HEENT, cardiovascular, pulmonary, gi and gu systems are negative, except as otherwise noted.    OBJECTIVE:     /84 (BP Location: Right arm, Cuff Size: Adult Regular)  Pulse 89  Temp 97.7  F (36.5  C) (Oral)  Ht 5' 6.5\" (1.689 m)  Wt 167 lb (75.8 kg)  SpO2 96%  BMI 26.55 kg/m2  Body mass index is 26.55 kg/(m^2).  GENERAL: healthy, " alert and severe distress  SKIN: collection of vesicles on erythematous base at L4-L5 level to left of spine and from the iliac crest line to mid suprapubic area a large area of erythema and clear fluid filled vesicles, no dried lesions at this time.  PSYCH: mentation appears normal, affect agitated   Diagnostic Test Results:  none     ASSESSMENT/PLAN:         ICD-10-CM    1. Herpes zoster without complication B02.9 valACYclovir (VALTREX) 1000 mg tablet     naproxen (NAPROSYN) 500 MG tablet     gabapentin (NEURONTIN) 300 MG capsule       Patient Instructions     Shingles  Shingles is a viral infection caused by the same virus as chicken pox. Anyone who has had chicken pox may get shingles later in life. The virus stays in the body, but remains dormant (asleep). Shingles often occurs in older persons or persons with lowered immunity. But it can affect anyone at any age.  Shingles starts as a tingling patch of skin on one side of the body. Small, painful blisters may then appear. The rash does not spread to the rest of the body.  Exposure to shingles cannot cause shingles. However, it can cause chicken pox in anyone who has not had chicken pox or has not been vaccinated. The contagious period ends when all blisters have crusted over (generally about 2 weeks after the illness begins).  After the blisters heal, the affected skin may be sensitive or painful for months (neuralgia). This often gradually goes away.  A shingles vaccine is available. This can help prevent shingles or make it less painful. It is generally recommended for adults over the age of 60 who have had chicken pox in the past, but who have never had shingles. Adults over 60 who have had neither chicken pox nor shingles can prevent both diseases with the chicken pox vaccine. Ask your healthcare provider about these vaccines.  Home care    Medicines may be prescribed to help relieve pain. Take these medicines as directed. Ask your healthcare provider or  pharmacist before using over-the-counter medicines for helping treat pain and itching.    In certain cases, antiviral medicines may be prescribed to reduce pain, shorten the illness, and prevent neuralgia. Take these medicines as directed.    Compresses made from a solution of cool water mixed with cornstarch or baking soda may help relieve pain and itching.     Gently wash skin daily with soap and water to help prevent infection.  Be certain to rinse off all of the soap, which can be irritating.    Trim fingernails and try not to scratch. Scratching the sores may leave scars.    Stay home from work or school until all blisters have formed a crust and you are no longer contagious.  Follow-up care  Follow up with your healthcare provider or as directed by our staff.  When to seek medical advice    Fever of 100.4 F (38 C) or higher, or as directed by your healthcare provider    Affected skin is on the face or neck and any of the following occur:  ? Headache  ? Eye pain  ? Changes in vision  ? Sores near the eye  ? Weakness of facial muscles    Pain, redness, or swelling of a joint    Signs of skin infection: colored drainage from the sores, warmth, increasing redness, or increasing pain  Date Last Reviewed: 9/25/2015 2000-2017 [employer] [address] [place] [address], [place] [address]. All rights reserved. This information is not intended as a substitute for professional medical care. Always follow your healthcare professional's instructions.            BEATA Rich [facility]

## 2018-10-09 ENCOUNTER — HOSPITAL ENCOUNTER (EMERGENCY)
Facility: CLINIC | Age: 78
Discharge: HOME | End: 2018-10-09
Attending: EMERGENCY MEDICINE | Admitting: EMERGENCY MEDICINE
Payer: MEDICARE

## 2018-10-09 ENCOUNTER — TELEPHONE (OUTPATIENT)
Dept: FAMILY MEDICINE | Facility: CLINIC | Age: 78
End: 2018-10-09

## 2018-10-09 VITALS
TEMPERATURE: 98.6 F | SYSTOLIC BLOOD PRESSURE: 153 MMHG | BODY MASS INDEX: 26.52 KG/M2 | DIASTOLIC BLOOD PRESSURE: 68 MMHG | RESPIRATION RATE: 16 BRPM | HEART RATE: 72 BPM | OXYGEN SATURATION: 96 % | HEIGHT: 66 IN | WEIGHT: 165 LBS

## 2018-10-09 DIAGNOSIS — H53.9 VISUAL DISTURBANCE: ICD-10-CM

## 2018-10-09 PROCEDURE — 99282 EMERGENCY DEPT VISIT SF MDM: CPT

## 2018-10-09 NOTE — DISCHARGE INSTRUCTIONS
The cause of your visual disturbance is unconfirmed, but does not appear to represent any immediately dangerous process.  Be sure to return promptly if symptoms return, and otherwise follow up soon with your eye doctor and regular clinic.

## 2018-10-09 NOTE — TELEPHONE ENCOUNTER
S: Pt is concerned she had a TIA.     B: HX of hypertension and hyperlipidemia   Has never had this experience before     A:  Occurrence eveninpm  Description: Reading, then could not read because left eye had scrambled vision  5-8 seconds of symptoms, then vision cleared.     Denies weakness  Denies facial drooping   Denies slurred words    Denies vision changes, today.     R: pt fears she had a TIA with sudden vision changes last evening. No residual effects. Discussed the emergency room, which is advised after possible TIA for possible imaging and risk for larger stroke.     Pt was very agreeable to plan.     Yasmine THOMPSON RN

## 2018-10-09 NOTE — ED AVS SNAPSHOT
Emergency Department    [address] [place] [id number]    Phone:  [phone]    Fax:  [phone]                                       Carolee Doherty   MRN: [id number]    Department:   Emergency Department   Date of Visit:  10/9/2018           After Visit Summary Signature Page     I have received my discharge instructions, and my questions have been answered. I have discussed any challenges I see with this plan with the nurse or doctor.    ..........................................................................................................................................  Patient/Patient Representative Signature      ..........................................................................................................................................  Patient Representative Print Name and Relationship to Patient    ..................................................               ................................................  Date                                   Time    ..........................................................................................................................................  Reviewed by Signature/Title    ...................................................              ..............................................  Date                                               Time          22EPIC Rev 08/18

## 2018-10-09 NOTE — ED PROVIDER NOTES
"  History     Chief Complaint:  Eye Problem     HPI   Carolee Doherty is a 78 year old female who presents with her  to the Emergency Department for evaluation of eye problem. The patient reports that last night at 2000 she had \"scrambled vision\" for 5-6 seconds only in her left eye.  She notes her vision almost immediately returned to normal and has remained so since that time.  After this episode she was able to return back to reading. This morning she called her Primary Care Provider's office, and was told to come here to the E.D. She denies having any history of atrial fibrillation.  She had no eye pain.  She is confident that it was just the left eye, and not her left visual field.  No eye discharge or recent eye injury.  No headache, confusion, speech disturbance, arm or leg weakness or numbness, or prior episodes.  She is not on blood thinners.      Allergies:  Amlodipine Besylate  Levaquin  Septra Ds [Sulfamethoxazole W-Trimethoprim]     Medications:    CARTIA  MG 24 hr capsule  gabapentin (NEURONTIN) 300 MG capsule  loratadine (CLARITIN) 10 MG tablet  meclizine (ANTIVERT) 25 MG tablet  naproxen (NAPROSYN) 500 MG tablet  valACYclovir (VALTREX) 1000 mg tablet     Past Medical History:    Bronchitis   Edema   Hyperlipidemia LDL goal <130   Other and unspecified hyperlipidemia   Undiagnosed cardiac murmurs   Unspecified essential hypertension   Heart murmur     Past Surgical History:    PHACOEMULSIFICATION CLEAR CORNEA WITH STANDARD INTRAOCULAR LENS IMPLANT X2  COLONOSCOPY X2    Family History:    History reviewed. No pertinent family history.      Social History:  Marital Status:     The patient was accompanied to the ED by her .   Recently moved closer to [facility].  Smoking Status: Former Smoker  Smokeless Tobacco: Never  Alcohol Use: No  PCP: Kristian Gupta      Review of Systems   All other systems reviewed and are negative.      Physical Exam   First Vitals:  BP: 153/68  Pulse: " "72  Temp: 98.6  F (37  C)  Resp: 16  Height: 167.6 cm (5' 6\")  Weight: 74.8 kg (165 lb)  SpO2: 96 %      Physical Exam  General: woman sitting upright in room 5, appears and acts younger than age,  at bedside  HENT: mucous membranes moist, face nontender, full range of motion of mandible, oropharynx clear, TMs normal  Eyes: Pupils equal round and reactive, no nystagmus, no scleral injection, no hypopyon or hyphema, EOMI  CV: regular rate, regular rhythm, no murmur audible, no bruit, no LE edema  Resp: clear throughout, normal effort  GI: abdomen soft and nontender, no guarding  MSK: no bony tenderness to face  Skin: appropriately warm and dry, no facial rash  Neuro: awake, alert, clear speech, fully oriented, face symmetric, no appreciable CN deficit,  normal, finger-nose normal bilaterally, strength and sensation intact in all extr, no meningismus, ambulatory without ataxia  Psych: slightly anxious, cooperative    Emergency Department Course       Emergency Department Course:  Nursing notes and vitals reviewed.  1209: I performed an exam of the patient as documented above.     Findings and plan explained to the Patient and spouse. Patient discharged home with instructions regarding supportive care, medications, and reasons to return. The importance of close follow-up was reviewed.     Impression & Plan      Medical Decision Making:  The cause of her very brief monocular visual disturbance is unconfirmed.  She promptly returned to baseline and has remained so.  She had no additional neurologic symptoms.  While I considered the possibility of transient ischemia, stroke, intracranial hemorrhage, intracranial hypertension, migraine, vasospasm, atypical seizure, intracranial tumor, temporal arteritis, glaucoma, retinal detachment, and numerous other potentially serious causes, her symptoms were so exceptionally brief that I do not think further emergent workup is currently indicated.  This was discussed with " "her.  Her detailed neurologic and ophthalmologic exam at this time is completely benign.  Reassurance was provided, while acknowledging to the patient that I cannot give her a specific diagnosis and I did encourage close outpatient follow-up through her ophthalmologist and primary care.  She was asked to return here for acute worsening at any hour.      Diagnosis:    ICD-10-CM    1. Visual disturbance, transient H53.9        Disposition:  discharged to home      Puja Fontenot  10/9/2018    EMERGENCY DEPARTMENT      This record was created at least in part using electronic voice recognition software, so please excuse any \"typos.\"      Scribe Disclosure:  I, Puja Fontenot, am serving as a scribe at 12:09 PM on 10/9/2018 to document services personally performed by Felipe Blancas MD based on my observations and the provider's statements to me.      Felipe Blancas MD  10/09/18 1405    "

## 2018-10-09 NOTE — ED AVS SNAPSHOT
Emergency Department    [address] [facility] [address]    Phone:  [phone]    Fax:  [phone]                                       Carolee Doherty   MRN: [id number]    Department:   Emergency Department   Date of Visit:  10/9/2018           Patient Information     Date Of Birth          1940        Your diagnoses for this visit were:     Visual disturbance        You were seen by Felipe Blancas MD.      Follow-up Information     Schedule an appointment as soon as possible for a visit with your Ophthalmologist.        Follow up with  Emergency Department.    Specialty:  EMERGENCY MEDICINE    Why:  As needed, If symptoms worsen    Contact information:    [address] [facility] [address] [phone]        Discharge Instructions         The cause of your visual disturbance is unconfirmed, but does not appear to represent any immediately dangerous process.  Be sure to return promptly if symptoms return, and otherwise follow up soon with your eye doctor and regular clinic.    24 Hour Appointment Hotline       To make an appointment at any [facility], call [phone] ([phone]). If you don't have a family doctor or clinic, we will help you find one. [facility] are conveniently located to serve the needs of you and your family.             Review of your medicines      Our records show that you are taking the medicines listed below. If these are incorrect, please call your family doctor or clinic.        Dose / Directions Last dose taken    CARTIA  MG 24 hr capsule   Quantity:  90 capsule   Generic drug:  diltiazem        TAKE 1 CAPSULE BY MOUTH EVERY DAY   Refills:  3        gabapentin 300 MG capsule   Commonly known as:  NEURONTIN   Dose:  300 mg   Quantity:  30 capsule        Take 1 capsule (300 mg) by mouth nightly as needed   Refills:  1        loratadine 10 MG tablet   Commonly known as:  CLARITIN        TK 1 T PO HS TO HELP  WITH ALLERGIES AND POST NASAL DRAINAGE   Refills:  12        meclizine 25 MG tablet   Commonly known as:  ANTIVERT   Dose:  25 mg   Quantity:  30 tablet        Take 1 tablet (25 mg) by mouth every 6 hours as needed for dizziness   Refills:  1        naproxen 500 MG tablet   Commonly known as:  NAPROSYN   Dose:  500 mg   Quantity:  30 tablet        Take 1 tablet (500 mg) by mouth 2 times daily as needed for moderate pain   Refills:  1        valACYclovir 1000 mg tablet   Commonly known as:  VALTREX   Dose:  1000 mg   Quantity:  21 tablet        Take 1 tablet (1,000 mg) by mouth 3 times daily   Refills:  0                Orders Needing Specimen Collection     None      Pending Results     No orders found from 10/7/2018 to 10/10/2018.            Pending Culture Results     No orders found from 10/7/2018 to 10/10/2018.            Pending Results Instructions     If you had any lab results that were not finalized at the time of your Discharge, you can call the ED Lab Result RN at [phone]. You will be contacted by this team for any positive Lab results or changes in treatment. The nurses are available 7 days a week from 10A to 6:30P.  You can leave a message 24 hours per day and they will return your call.        Test Results From Your Hospital Stay               Clinical Quality Measure: Blood Pressure Screening     Your blood pressure was checked while you were in the emergency department today. The last reading we obtained was  BP: 153/68 . Please read the guidelines below about what these numbers mean and what you should do about them.  If your systolic blood pressure (the top number) is less than 120 and your diastolic blood pressure (the bottom number) is less than 80, then your blood pressure is normal. There is nothing more that you need to do about it.  If your systolic blood pressure (the top number) is 120-139 or your diastolic blood pressure (the bottom number) is 80-89, your blood pressure may be higher  "than it should be. You should have your blood pressure rechecked within a year by a primary care provider.  If your systolic blood pressure (the top number) is 140 or greater or your diastolic blood pressure (the bottom number) is 90 or greater, you may have high blood pressure. High blood pressure is treatable, but if left untreated over time it can put you at risk for heart attack, stroke, or kidney failure. You should have your blood pressure rechecked by a primary care provider within the next 4 weeks.  If your provider in the emergency department today gave you specific instructions to follow-up with your doctor or provider even sooner than that, you should follow that instruction and not wait for up to 4 weeks for your follow-up visit.        Thank you for choosing [facility]       Thank you for choosing [facility] for your care. Our goal is always to provide you with excellent care. Hearing back from our patients is one way we can continue to improve our services. Please take a few minutes to complete the written survey that you may receive in the mail after you visit with us. Thank you!        [employer] Information     [employer] lets you send messages to your doctor, view your test results, renew your prescriptions, schedule appointments and more. To sign up, go to [email or web address] . Click on \"Log in\" on the left side of the screen, which will take you to the Welcome page. Then click on \"Sign up Now\" on the right side of the page.     You will be asked to enter the access code listed below, as well as some personal information. Please follow the directions to create your username and password.     Your access code is: [email or web address]  Expires: 2019 12:25 PM     Your access code will  in 90 days. If you need help or a new code, please call your [facility] clinic or [phone].        Care EveryWhere ID     This is your Care EveryWhere ID. This could be used by other organizations to access your [facility] " medical records  [id number]        Equal Access to Services     ARNAUD CISSE : Charlie Roque, breana rendon, anabelle harmon. So [facility] [phone].    ATENCIÓN: Si habla español, tiene a wilburn disposición servicios gratuitos de asistencia lingüística. Llame al [phone].    We comply with applicable federal civil rights laws and [place] laws. We do not discriminate on the basis of race, color, national origin, age, disability, sex, sexual orientation, or gender identity.            After Visit Summary       This is your record. Keep this with you and show to your community pharmacist(s) and doctor(s) at your next visit.

## 2018-12-27 ENCOUNTER — OFFICE VISIT (OUTPATIENT)
Dept: FAMILY MEDICINE | Facility: CLINIC | Age: 78
End: 2018-12-27
Payer: COMMERCIAL

## 2018-12-27 VITALS
SYSTOLIC BLOOD PRESSURE: 149 MMHG | TEMPERATURE: 97 F | DIASTOLIC BLOOD PRESSURE: 80 MMHG | HEART RATE: 97 BPM | WEIGHT: 172.3 LBS | BODY MASS INDEX: 27.69 KG/M2 | OXYGEN SATURATION: 94 % | HEIGHT: 66 IN

## 2018-12-27 DIAGNOSIS — M53.3 COCCYDYNIA: Primary | ICD-10-CM

## 2018-12-27 PROCEDURE — 99213 OFFICE O/P EST LOW 20 MIN: CPT | Performed by: NURSE PRACTITIONER

## 2018-12-27 ASSESSMENT — MIFFLIN-ST. JEOR: SCORE: 1288.3

## 2018-12-27 NOTE — PROGRESS NOTES
"  SUBJECTIVE:   Carolee Doherty is a 78 year old female who presents to clinic today for the following health issues:        Suspects recurrence of shingles because she has small skin lesion at the site she had shingles last may.  And she c/o residual sensation of the old inflammation that is not \"pain\"- just noticeable    Tail bone tenderness for a few months- gets massage and that helps    Problem list and histories reviewed & adjusted, as indicated.  Additional history: as documented    Patient Active Problem List   Diagnosis     Benign essential hypertension     Heart murmur     Localized edema     Hyperlipidemia LDL goal <130     Past Surgical History:   Procedure Laterality Date     COLONOSCOPY       COLONOSCOPY N/A 4/4/2017    Procedure: COLONOSCOPY;  Surgeon: Yaakov Middleton MD;  Location:  GI     PHACOEMULSIFICATION CLEAR CORNEA WITH STANDARD INTRAOCULAR LENS IMPLANT  9/5/2012    Procedure: PHACOEMULSIFICATION CLEAR CORNEA WITH STANDARD INTRAOCULAR LENS IMPLANT;  RIGHT PHACOEMULSIFCATION CLEAR CORNEA WITH STANDARD INTRAOCULAR LENS IMPLANT ;  Surgeon: Beto Gaston MD;  Location:  EC     PHACOEMULSIFICATION CLEAR CORNEA WITH STANDARD INTRAOCULAR LENS IMPLANT  10/31/2012    Procedure: PHACOEMULSIFICATION CLEAR CORNEA WITH STANDARD INTRAOCULAR LENS IMPLANT;  LEFT PHACOEMULSIFICATION CLEAR CORNEA WITH STANDARD INTRAOCULAR LENS IMPLANT ;  Surgeon: Beto Gaston MD;  Location: [facility]       Social History     Tobacco Use     Smoking status: Former Smoker     Smokeless tobacco: Never Used   Substance Use Topics     Alcohol use: No     Family History   Problem Relation Age of Onset     Family History Negative Mother      Family History Negative Father      Diabetes No family hx of      Coronary Artery Disease No family hx of      Hypertension No family hx of      Hyperlipidemia No family hx of      Cerebrovascular Disease No family hx of      Breast Cancer No family hx of      Colon Cancer No family hx of  " "    Prostate Cancer No family hx of      Other Cancer No family hx of      Depression No family hx of      Anxiety Disorder No family hx of      Mental Illness No family hx of      Substance Abuse No family hx of      Anesthesia Reaction No family hx of      Asthma No family hx of      Osteoporosis No family hx of      Genetic Disorder No family hx of      Thyroid Disease No family hx of      Obesity No family hx of      Unknown/Adopted No family hx of          Current Outpatient Medications   Medication Sig Dispense Refill     CARTIA  MG 24 hr capsule TAKE 1 CAPSULE BY MOUTH EVERY DAY 90 capsule 3     gabapentin (NEURONTIN) 300 MG capsule Take 1 capsule (300 mg) by mouth nightly as needed 30 capsule 1     loratadine (CLARITIN) 10 MG tablet TK 1 T PO HS TO HELP WITH ALLERGIES AND POST NASAL DRAINAGE  12     meclizine (ANTIVERT) 25 MG tablet Take 1 tablet (25 mg) by mouth every 6 hours as needed for dizziness 30 tablet 1     naproxen (NAPROSYN) 500 MG tablet Take 1 tablet (500 mg) by mouth 2 times daily as needed for moderate pain 30 tablet 1     valACYclovir (VALTREX) 1000 mg tablet Take 1 tablet (1,000 mg) by mouth 3 times daily 21 tablet 0     Allergies   Allergen Reactions     Amlodipine Besylate Swelling     Levaquin      Strange thoughts     Septra Ds [Sulfamethoxazole W-Trimethoprim]      Dunn lousy       Reviewed and updated as needed this visit by clinical staff       Reviewed and updated as needed this visit by Provider         ROS:  Constitutional, HEENT, cardiovascular, pulmonary, gi and gu systems are negative, except as otherwise noted.    OBJECTIVE:     /80 (BP Location: Right arm, Cuff Size: Adult Regular)   Pulse 97   Temp 97  F (36.1  C) (Oral)   Ht 1.676 m (5' 6\")   Wt 78.2 kg (172 lb 4.8 oz)   SpO2 94%   BMI 27.81 kg/m    Body mass index is 27.81 kg/m .  GENERAL: healthy, alert and no distress  SKIN: no suspicious lesions or rashes, small healing post inflammatory hair " follicle  NEURO: Normal strength and tone, mentation intact and speech normal  BACK: no CVA tenderness, no paralumbar tenderness, no sacral coccygeal pain  PSYCH: mentation appears normal, affect mildly anxious    Diagnostic Test Results:  none     ASSESSMENT/PLAN:       ICD-10-CM    1. Coccydynia M53.3 [facility] PT, HAND, AND CHIROPRACTIC REFERRAL   reassured no evidence of shingles at this time  She will go to pharmacy for the first shingles vaccine      BEATA Rich [facility]

## 2018-12-28 ENCOUNTER — THERAPY VISIT (OUTPATIENT)
Dept: PHYSICAL THERAPY | Facility: CLINIC | Age: 78
End: 2018-12-28
Payer: MEDICARE

## 2018-12-28 DIAGNOSIS — M53.3 PAIN IN THE COCCYX: ICD-10-CM

## 2018-12-28 DIAGNOSIS — M53.3 COCCYDYNIA: ICD-10-CM

## 2018-12-28 PROCEDURE — G8982 BODY POS GOAL STATUS: HCPCS | Mod: GP | Performed by: PHYSICAL THERAPIST

## 2018-12-28 PROCEDURE — 97112 NEUROMUSCULAR REEDUCATION: CPT | Mod: GP | Performed by: PHYSICAL THERAPIST

## 2018-12-28 PROCEDURE — 97161 PT EVAL LOW COMPLEX 20 MIN: CPT | Mod: GP | Performed by: PHYSICAL THERAPIST

## 2018-12-28 PROCEDURE — G8981 BODY POS CURRENT STATUS: HCPCS | Mod: GP | Performed by: PHYSICAL THERAPIST

## 2018-12-28 NOTE — PROGRESS NOTES
[facility] Initial Evaluation  Subjective:  The history is provided by the patient. No  was used.   Affected Side: coccyx.  Condition occurred with:  Insidious onset.  Condition occurred: for unknown reasons.  This is a new condition  Pain came on after a bought of Shingles. She says she was sitting differently due to pain. Pain has been for about 1-2 months. MD 12/27/18. Did get gentle massage in area by massage therapist and had 2 days full relief.    Site of Pain: coccyx.  Radiates to:  No radiation.   and is intermittent and reported as 5/10.  Associated symptoms:  Loss of motion/stiffness. Pain is worse during the day.  Symptoms are exacerbated by certain positions and sitting and relieved by activity/movement.  Since onset symptoms are unchanged.        General health as reported by patient is good.  Pertinent medical history includes:  None.  Medical allergies: yes.  Other surgeries include:  No.  Current medications:  None as reported by the patient.  Current occupation is REtired.            Red flags:  Pain at night/rest (turning over).                          Objective:  System         Lumbar/SI Evaluation  ROM:  AROM Lumbar: normal      Lumbar Myotomes:  normal            Lumbar DTR's:  normal      Cord Signs:  normal    Lumbar Dermtomes:  normal                Neural Tension/Mobility:  Lumbar:  Normal        Lumbar Palpation:  Palpation (lumbar): coccyx only.                                                         General     ROS    Assessment/Plan:    Patient is a 78 year old female with sacral complaints.    Patient has the following significant findings with corresponding treatment plan.                Diagnosis 1:  Coccyx pain  Pain -  self management, education and home program  Decreased function - therapeutic activities and home program  Impaired posture - neuro re-education    Therapy Evaluation Codes:   1) History comprised of:   Personal factors that  impact the plan of care:      None.    Comorbidity factors that impact the plan of care are:      None.     Medications impacting care: None.  2) Examination of Body Systems comprised of:   Body structures and functions that impact the plan of care:      coccyx.   Activity limitations that impact the plan of care are:      Sitting.  3) Clinical presentation characteristics are:   Stable/Uncomplicated.  4) Decision-Making    Low complexity using standardized patient assessment instrument and/or measureable assessment of functional outcome.  Cumulative Therapy Evaluation is: Low complexity.    Previous and current functional limitations:  (See Goal Flow Sheet for this information)    Short term and Long term goals: (See Goal Flow Sheet for this information)     Communication ability:  Patient appears to be able to clearly communicate and understand verbal and written communication and follow directions correctly.  Treatment Explanation - The following has been discussed with the patient:   RX ordered/plan of care  Anticipated outcomes  Possible risks and side effects  This patient would benefit from PT intervention to resume normal activities.   Rehab potential is excellent.    Frequency:  1 X week, once daily  Duration:  for 6 weeks  Discharge Plan:  Achieve all LTG.  Independent in home treatment program.  Reach maximal therapeutic benefit.    Please refer to the daily flowsheet for treatment today, total treatment time and time spent performing 1:1 timed codes.

## 2018-12-28 NOTE — LETTER
DEPARTMENT OF HEALTH AND HUMAN SERVICES  CENTERS FOR MEDICARE & MEDICAID SERVICES    PLAN/UPDATED PLAN OF PROGRESS FOR OUTPATIENT REHABILITATION    PATIENTS NAME:  Carolee Doherty   : 1940  PROVIDER NUMBER:    [id number]  HICN:[id number]     PROVIDER NAME: [facility] PHYSICAL THERAPY  MEDICAL RECORD NUMBER: [id number]   START OF CARE DATE:  SOC Date: 18   TYPE:  PT  PRIMARY/TREATMENT DIAGNOSIS: (Pertinent Medical Diagnosis)     Coccydynia  Pain in the coccyx    VISITS FROM START OF CARE:  Rxs Used: 1     [facility] Initial Evaluation  Subjective:  The history is provided by the patient. No  was used.   Affected Side: coccyx.  Condition occurred with:  Insidious onset.  Condition occurred: for unknown reasons.  This is a new condition  Pain came on after a bought of Shingles. She says she was sitting differently due to pain. Pain has been for about 1-2 months. MD 18. Did get gentle massage in area by massage therapist and had 2 days full relief.    Site of Pain: coccyx.  Radiates to:  No radiation.   and is intermittent and reported as 5/10.  Associated symptoms:  Loss of motion/stiffness. Pain is worse during the day.  Symptoms are exacerbated by certain positions and sitting and relieved by activity/movement.  Since onset symptoms are unchanged.        General health as reported by patient is good.  Pertinent medical history includes:  None.  Medical allergies: yes.  Other surgeries include:  No.  Current medications:  None as reported by the patient.  Current occupation is REtired.      Red flags:  Pain at night/rest (turning over).  Objective:  System  Lumbar/SI Evaluation  ROM:  AROM Lumbar: normal  Lumbar Myotomes:  normal  Lumbar DTR's:  normal  Cord Signs:  normal  Lumbar Dermtomes:  normal  Neural Tension/Mobility:  Lumbar:  Normal    Lumbar Palpation:  Palpation (lumbar): coccyx only.  Assessment/Plan:    Patient is a 78  year old female with sacral complaints.    Patient has the following significant findings with corresponding treatment plan.                Diagnosis 1:  Coccyx pain  Pain -  self management, education and home program  Decreased function - therapeutic activities and home program  Impaired posture - neuro re-education  Therapy Evaluation Codes:   1) History comprised of:   Personal factors that impact the plan of care:      None.    Comorbidity factors that impact the plan of care are:      None.     Medications impacting care: None.  2) Examination of Body Systems comprised of:   Body structures and functions that impact the plan of care:      coccyx.   Activity limitations that impact the plan of care are:      Sitting.  3) Clinical presentation characteristics are:   Stable/Uncomplicated.  4) Decision-Making    Low complexity using standardized patient assessment instrument and/or measureable assessment of functional outcome.  Cumulative Therapy Evaluation is: Low complexity.    Previous and current functional limitations:  (See Goal Flow Sheet for this information)    Short term and Long term goals: (See Goal Flow Sheet for this information)     Communication ability:  Patient appears to be able to clearly communicate and understand verbal and written communication and follow directions correctly.  Treatment Explanation - The following has been discussed with the patient:   RX ordered/plan of care  Anticipated outcomes  Possible risks and side effects  This patient would benefit from PT intervention to resume normal activities.   Rehab potential is excellent.    Frequency:  1 X week, once daily  Duration:  for 6 weeks  Discharge Plan:  Achieve all LTG.  Independent in home treatment program.  Reach maximal therapeutic benefit.      Caregiver Signature/Credentials _____________________________ Date ________       Treating Provider: Esau Alberto I have reviewed and certified the need for these services and  "plan of treatment while under my care.      PHYSICIAN'S SIGNATURE:   _________________________________________  Date___________   Radha Turner MD    Certification period:  Beginning of Cert date period: 12/28/18 to  End of Cert period date: 03/27/19   Functional Level Progress Report: Please see attached \"Goal Flow sheet for Functional level.\"    ____X____ Continue Services or       ________ DC Services                Service dates: From  SOC Date: 12/28/18 date to present                         "

## 2019-02-20 PROBLEM — M53.3 PAIN IN THE COCCYX: Status: RESOLVED | Noted: 2018-12-28 | Resolved: 2019-02-20

## 2019-05-20 DIAGNOSIS — I10 BENIGN ESSENTIAL HYPERTENSION: ICD-10-CM

## 2019-05-20 NOTE — TELEPHONE ENCOUNTER
"CARTIA  MG 24 hr capsule    Last Written Prescription Date:  05/07/2018  Last Fill Quantity: 90,  # refills: 3   Last office visit: 12/27/2018 with prescribing provider:  Yue   Future Office Visit:  Unknown     Requested Prescriptions   Pending Prescriptions Disp Refills     CARTIA  MG 24 hr capsule [Pharmacy Med Name: CARTIA (DILTIAZEM) 180MGXT CAPS] 90 capsule 0     Sig: TAKE 1 CAPSULE BY MOUTH EVERY DAY       Calcium Channel Blockers Protocol  Failed - 5/20/2019  9:28 AM        Failed - Blood pressure under 140/90 in past 12 months     BP Readings from Last 3 Encounters:   12/27/18 149/80   10/09/18 153/68   05/21/18 148/84                 Failed - Normal ALT in past 12 months     Recent Labs   Lab Test 05/03/18  1047   ALT 27             Failed - Normal serum creatinine on file in past 12 months     Recent Labs   Lab Test 05/03/18  1047   CR 0.67             Passed - Recent (12 mo) or future (30 days) visit within the authorizing provider's specialty     Patient had office visit in the last 12 months or has a visit in the next 30 days with authorizing provider or within the authorizing provider's specialty.  See \"Patient Info\" tab in inbasket, or \"Choose Columns\" in Meds & Orders section of the refill encounter.              Passed - Medication is active on med list        Passed - Patient is age 18 or older        Passed - No active pregnancy on record        Passed - No positive pregnancy test in past 12 months          "

## 2019-05-22 RX ORDER — DILTIAZEM HYDROCHLORIDE 180 MG/1
CAPSULE, EXTENDED RELEASE ORAL
Qty: 30 CAPSULE | Refills: 0 | Status: SHIPPED | OUTPATIENT
Start: 2019-05-22 | End: 2019-06-13

## 2019-05-22 NOTE — TELEPHONE ENCOUNTER
Routing refill request to provider for review/approval because:  Allergy/contraindication noted.     Pt due for OV pended maverick with note to schedule.     Please review and authorize if appropriate,     Thank you,   Yasmine NAVARRETE RN

## 2019-06-13 ENCOUNTER — OFFICE VISIT (OUTPATIENT)
Dept: FAMILY MEDICINE | Facility: CLINIC | Age: 79
End: 2019-06-13
Payer: COMMERCIAL

## 2019-06-13 VITALS
HEIGHT: 66 IN | SYSTOLIC BLOOD PRESSURE: 136 MMHG | HEART RATE: 94 BPM | BODY MASS INDEX: 27.08 KG/M2 | WEIGHT: 168.5 LBS | DIASTOLIC BLOOD PRESSURE: 76 MMHG | OXYGEN SATURATION: 93 % | TEMPERATURE: 98.3 F

## 2019-06-13 DIAGNOSIS — E78.5 HYPERLIPIDEMIA LDL GOAL <130: ICD-10-CM

## 2019-06-13 DIAGNOSIS — I10 BENIGN ESSENTIAL HYPERTENSION: ICD-10-CM

## 2019-06-13 DIAGNOSIS — R21 RASH: Primary | ICD-10-CM

## 2019-06-13 PROCEDURE — 99213 OFFICE O/P EST LOW 20 MIN: CPT | Performed by: INTERNAL MEDICINE

## 2019-06-13 RX ORDER — CLOBETASOL PROPIONATE 0.5 MG/G
CREAM TOPICAL 2 TIMES DAILY
Qty: 60 G | Refills: 11 | Status: SHIPPED | OUTPATIENT
Start: 2019-06-13 | End: 2022-04-27

## 2019-06-13 RX ORDER — DILTIAZEM HYDROCHLORIDE 180 MG/1
180 CAPSULE, COATED, EXTENDED RELEASE ORAL DAILY
Qty: 90 CAPSULE | Refills: 3 | Status: SHIPPED | OUTPATIENT
Start: 2019-06-13 | End: 2019-07-11

## 2019-06-13 ASSESSMENT — MIFFLIN-ST. JEOR: SCORE: 1271.06

## 2019-06-13 NOTE — PROGRESS NOTES
"Subjective     Carolee Doherty is a 78 year old female who presents to clinic today for the following health issues:    HPI     Refill on Cartia  Recheck on nerve pain from shingles  Rash and lesions on left ankle    Very pleasant 78-year-old female with a history of hypertension and a steroid responsive rash on her bilateral lower extremities for which she formerly received a prescription for topical clobetasol from her dermatologist who has subsequently retired.  She requests a refill of that medication for her unchanged rash today.  Also, she is overdue for follow-up on her high blood pressure.  She is tolerating diltiazem without issues.  She was treated for shingles since I last saw her and has mild amounts of residual tingling pain in the distribution of her previous now resolved shingles rash that is very tolerable.  Otherwise she feels well and does not have any other specific complaints.      Reviewed and updated as needed this visit by Provider  Tobacco  Med Hx  Surg Hx  Fam Hx  Soc Hx        Review of Systems   ROS COMP: Constitutional, HEENT, cardiovascular, pulmonary, gi and gu systems are negative, except as otherwise noted.      Objective    /76 (BP Location: Right arm, Patient Position: Sitting, Cuff Size: Adult Large)   Pulse 94   Temp 98.3  F (36.8  C) (Oral)   Ht 1.676 m (5' 6\")   Wt 76.4 kg (168 lb 8 oz)   SpO2 93%   BMI 27.20 kg/m    Body mass index is 27.2 kg/m .  Physical Exam   GENERAL: healthy, alert and no distress  EYES: Eyes grossly normal to inspection, PERRL and conjunctivae and sclerae normal  HENT: ear canals and TM's normal, nose and mouth without ulcers or lesions  NECK: no adenopathy, no asymmetry, masses, or scars and thyroid normal to palpation  RESP: lungs clear to auscultation - no rales, rhonchi or wheezes  CV: Heart with regular rate and rhythm. No carotid bruits.    ABDOMEN: soft, nontender, no hepatosplenomegaly, no masses and bowel sounds normal  MS: no gross " "musculoskeletal defects noted, no edema  NEURO: Normal strength and tone, mentation intact and speech normal  PSYCH: mentation appears normal, affect normal/bright  SKIN: Scaly plaques on the left greater than right ankle consistent with psoriasis or some other steroid responsive dermatosis          Assessment & Plan     1. Rash  Refill clobetasol  - clobetasol (TEMOVATE) 0.05 % external cream; Apply topically 2 times daily  Dispense: 60 g; Refill: 11    2. Benign essential hypertension  Under adequate control, continue Cartia  - diltiazem ER COATED BEADS (CARTIA XT) 180 MG 24 hr capsule; Take 1 capsule (180 mg) by mouth daily  Dispense: 90 capsule; Refill: 3    3. Hyperlipidemia LDL goal <130  Return when convenient for a preventive exam with fasting labs       BMI:   Estimated body mass index is 27.2 kg/m  as calculated from the following:    Height as of this encounter: 1.676 m (5' 6\").    Weight as of this encounter: 76.4 kg (168 lb 8 oz).   Weight management plan: Discussed healthy diet and exercise guidelines            Return in about 3 months (around 9/13/2019) for Preventive Visit.    Kristian Gupta MD  [facility]        "

## 2019-06-24 DIAGNOSIS — I10 BENIGN ESSENTIAL HYPERTENSION: ICD-10-CM

## 2019-06-25 NOTE — TELEPHONE ENCOUNTER
"Last Written Prescription Date:  6/13/19  Last Fill Quantity: 90 capsule,  # refills: 3   Last office visit: 6/13/2019 with prescribing provider:  Alonso   Future Office Visit:   Next 5 appointments (look out 90 days)    Sep 09, 2019 10:00 AM CDT  [facility] [address] [facility] [address] [phone]         Requested Prescriptions   Pending Prescriptions Disp Refills     CARTIA  MG 24 hr capsule [Pharmacy Med Name: CARTIA (DILTIAZEM) 180MGXT CAPS] 90 capsule 0     Sig: TAKE 1 CAPSULE BY MOUTH DAILY       Calcium Channel Blockers Protocol  Failed - 6/24/2019  5:22 PM        Failed - Normal ALT in past 12 months     Recent Labs   Lab Test 05/03/18  1047   ALT 27             Failed - Normal serum creatinine on file in past 12 months     Recent Labs   Lab Test 05/03/18  1047   CR 0.67             Passed - Blood pressure under 140/90 in past 12 months     BP Readings from Last 3 Encounters:   06/13/19 136/76   12/27/18 149/80   10/09/18 153/68                 Passed - Recent (12 mo) or future (30 days) visit within the authorizing provider's specialty     Patient had office visit in the last 12 months or has a visit in the next 30 days with authorizing provider or within the authorizing provider's specialty.  See \"Patient Info\" tab in inbasket, or \"Choose Columns\" in Meds & Orders section of the refill encounter.              Passed - Medication is active on med list        Passed - Patient is age 18 or older        Passed - No active pregnancy on record        Passed - No positive pregnancy test in past 12 months          "

## 2019-06-26 RX ORDER — DILTIAZEM HYDROCHLORIDE 180 MG/1
CAPSULE, EXTENDED RELEASE ORAL
Qty: 90 CAPSULE | Refills: 0 | Status: SHIPPED | OUTPATIENT
Start: 2019-06-26 | End: 2019-09-09

## 2019-06-26 NOTE — TELEPHONE ENCOUNTER
Pended 90 days, further refills at physical. VERY high warning to pcp for review.  Debbie Leblanc RN

## 2019-07-11 ENCOUNTER — TELEPHONE (OUTPATIENT)
Dept: FAMILY MEDICINE | Facility: CLINIC | Age: 79
End: 2019-07-11

## 2019-07-11 NOTE — TELEPHONE ENCOUNTER
TO PCP     See below FYI from pt - added note to Katarina on med list that pt wants brand name only     Pt states her symptoms are fully resolved but in future just wants to be sure she gets the brand name     Griselda PARK RN

## 2019-07-11 NOTE — TELEPHONE ENCOUNTER
Reason for call:  Patient reporting a symptom    Symptom or request: dreams were black and white, sad, all day gave her sadness, L swollen ankle, tears in eyes when she wakes    Duration (how long have symptoms been present): 5 days associated with taking the generic version of CARTIA  MG 24 hr capsule (is not currently in it anymore - filled a new Rx for 30 days of name brand)    Have you been treated for this before? No    Additional comments: Pt wants her chart to reflect that she cannot take the generic cartia pills because she gets the above symptoms.    Phone Number patient can be reached at:  Home number on file [phone] (home)    Best Time:  any    Can we leave a detailed message on this number:  YES    Call taken on 7/11/2019 at 1:15 PM by Rhiannon Garza

## 2019-09-09 ENCOUNTER — OFFICE VISIT (OUTPATIENT)
Dept: FAMILY MEDICINE | Facility: CLINIC | Age: 79
End: 2019-09-09
Payer: COMMERCIAL

## 2019-09-09 VITALS
SYSTOLIC BLOOD PRESSURE: 121 MMHG | WEIGHT: 166.9 LBS | HEIGHT: 66 IN | BODY MASS INDEX: 26.82 KG/M2 | TEMPERATURE: 97.4 F | DIASTOLIC BLOOD PRESSURE: 71 MMHG | HEART RATE: 79 BPM | OXYGEN SATURATION: 94 %

## 2019-09-09 DIAGNOSIS — Z23 NEED FOR PROPHYLACTIC VACCINATION AND INOCULATION AGAINST INFLUENZA: ICD-10-CM

## 2019-09-09 DIAGNOSIS — Z00.00 ROUTINE GENERAL MEDICAL EXAMINATION AT A HEALTH CARE FACILITY: Primary | ICD-10-CM

## 2019-09-09 DIAGNOSIS — E78.5 HYPERLIPIDEMIA LDL GOAL <130: ICD-10-CM

## 2019-09-09 DIAGNOSIS — I10 BENIGN ESSENTIAL HYPERTENSION: ICD-10-CM

## 2019-09-09 LAB
ALBUMIN SERPL-MCNC: 3.7 G/DL (ref 3.4–5)
ALP SERPL-CCNC: 98 U/L (ref 40–150)
ALT SERPL W P-5'-P-CCNC: 30 U/L (ref 0–50)
ANION GAP SERPL CALCULATED.3IONS-SCNC: 5 MMOL/L (ref 3–14)
AST SERPL W P-5'-P-CCNC: 24 U/L (ref 0–45)
BILIRUB SERPL-MCNC: 0.5 MG/DL (ref 0.2–1.3)
BUN SERPL-MCNC: 20 MG/DL (ref 7–30)
CALCIUM SERPL-MCNC: 9 MG/DL (ref 8.5–10.1)
CHLORIDE SERPL-SCNC: 109 MMOL/L (ref 94–109)
CHOLEST SERPL-MCNC: 213 MG/DL
CO2 SERPL-SCNC: 26 MMOL/L (ref 20–32)
CREAT SERPL-MCNC: 0.72 MG/DL (ref 0.52–1.04)
ERYTHROCYTE [DISTWIDTH] IN BLOOD BY AUTOMATED COUNT: 14.6 % (ref 10–15)
GFR SERPL CREATININE-BSD FRML MDRD: 80 ML/MIN/{1.73_M2}
GLUCOSE SERPL-MCNC: 96 MG/DL (ref 70–99)
HCT VFR BLD AUTO: 44.9 % (ref 35–47)
HDLC SERPL-MCNC: 59 MG/DL
HGB BLD-MCNC: 15.3 G/DL (ref 11.7–15.7)
LDLC SERPL CALC-MCNC: 132 MG/DL
MCH RBC QN AUTO: 30.1 PG (ref 26.5–33)
MCHC RBC AUTO-ENTMCNC: 34.1 G/DL (ref 31.5–36.5)
MCV RBC AUTO: 88 FL (ref 78–100)
NONHDLC SERPL-MCNC: 154 MG/DL
PLATELET # BLD AUTO: 270 10E9/L (ref 150–450)
POTASSIUM SERPL-SCNC: 3.8 MMOL/L (ref 3.4–5.3)
PROT SERPL-MCNC: 8 G/DL (ref 6.8–8.8)
RBC # BLD AUTO: 5.09 10E12/L (ref 3.8–5.2)
SODIUM SERPL-SCNC: 140 MMOL/L (ref 133–144)
TRIGL SERPL-MCNC: 110 MG/DL
WBC # BLD AUTO: 5.9 10E9/L (ref 4–11)

## 2019-09-09 PROCEDURE — 85027 COMPLETE CBC AUTOMATED: CPT | Performed by: INTERNAL MEDICINE

## 2019-09-09 PROCEDURE — G0008 ADMIN INFLUENZA VIRUS VAC: HCPCS | Performed by: INTERNAL MEDICINE

## 2019-09-09 PROCEDURE — 80061 LIPID PANEL: CPT | Performed by: INTERNAL MEDICINE

## 2019-09-09 PROCEDURE — 36415 COLL VENOUS BLD VENIPUNCTURE: CPT | Performed by: INTERNAL MEDICINE

## 2019-09-09 PROCEDURE — 90662 IIV NO PRSV INCREASED AG IM: CPT | Performed by: INTERNAL MEDICINE

## 2019-09-09 PROCEDURE — 99397 PER PM REEVAL EST PAT 65+ YR: CPT | Mod: 25 | Performed by: INTERNAL MEDICINE

## 2019-09-09 PROCEDURE — 80053 COMPREHEN METABOLIC PANEL: CPT | Performed by: INTERNAL MEDICINE

## 2019-09-09 RX ORDER — DILTIAZEM HYDROCHLORIDE 180 MG/1
180 CAPSULE, COATED, EXTENDED RELEASE ORAL DAILY
Qty: 90 CAPSULE | Refills: 3 | Status: SHIPPED | OUTPATIENT
Start: 2019-09-09 | End: 2020-09-10

## 2019-09-09 ASSESSMENT — ACTIVITIES OF DAILY LIVING (ADL): CURRENT_FUNCTION: NO ASSISTANCE NEEDED

## 2019-09-09 ASSESSMENT — MIFFLIN-ST. JEOR: SCORE: 1264.18

## 2019-09-09 NOTE — PROGRESS NOTES
"SUBJECTIVE:   Carolee Doherty is a 79 year old female who presents for Preventive Visit.    Are you in the first 12 months of your Medicare coverage?  No    Healthy Habits:     In general, how would you rate your overall health?  Excellent    Frequency of exercise:  6-7 days/week    Duration of exercise:  15-30 minutes    Do you usually eat at least 4 servings of fruit and vegetables a day, include whole grains    & fiber and avoid regularly eating high fat or \"junk\" foods?  Yes    Taking medications regularly:  Yes    Barriers to taking medications:  None    Medication side effects:  None    Ability to successfully perform activities of daily living:  No assistance needed    Home Safety:  No safety concerns identified    Hearing Impairment:  No hearing concerns    In the past 6 months, have you been bothered by leaking of urine?  No    In general, how would you rate your overall mental or emotional health?  Excellent      PHQ-2 Total Score: 0    Additional concerns today:  No    Do you feel safe in your environment? Yes    Do you have a Health Care Directive? Yes: Advance Directive has been received and scanned.      Fall risk  Fallen 2 or more times in the past year?: No  Any fall with injury in the past year?: No    Cognitive Screening   1) Repeat 3 items (Leader, Season, Table)    2) Clock draw: NORMAL  3) 3 item recall: Recalls 3 objects  Results: 3 items recalled: COGNITIVE IMPAIRMENT LESS LIKELY    Mini-CogTM Copyright AYAD Vargas. Licensed by the author for use in [facility]; reprinted with permission (randall@.[facility]). All rights reserved.      Do you have sleep apnea, excessive snoring or daytime drowsiness?: no    Reviewed and updated as needed this visit by clinical staff  Tobacco  Allergies  Meds  Med Hx  Surg Hx  Fam Hx  Soc Hx        Reviewed and updated as needed this visit by Provider  Tobacco  Med Hx  Surg Hx  Fam Hx  Soc Hx       Social History     Tobacco Use     Smoking status: " Former Smoker     Packs/day: 0.10     Years: 3.00     Pack years: 0.30     Smokeless tobacco: Never Used   Substance Use Topics     Alcohol use: No     If you drink alcohol do you typically have >3 drinks per day or >7 drinks per week? No    No flowsheet data found.      Current providers sharing in care for this patient include:   Patient Care Team:  Kristian Gupta MD as PCP - General (Internal Medicine)  Kristian Gupta MD as Assigned PCP    The following health maintenance items are reviewed in Epic and correct as of today:  Health Maintenance   Topic Date Due     ADVANCE CARE PLANNING  1940     MEDICARE ANNUAL WELLNESS VISIT  05/03/2019     FALL RISK ASSESSMENT  05/03/2019     INFLUENZA VACCINE (1) 09/01/2019     LIPID  05/03/2023     DTAP/TDAP/TD IMMUNIZATION (2 - Td) 01/04/2026     DEXA  Completed     PHQ-2  Completed     PNEUMOCOCCAL IMMUNIZATION 65+ LOW/MEDIUM RISK  Completed     ZOSTER IMMUNIZATION  Completed     IPV IMMUNIZATION  Aged Out     MENINGITIS IMMUNIZATION  Aged Out     Patient Active Problem List   Diagnosis     Benign essential hypertension     Heart murmur     Localized edema     Hyperlipidemia LDL goal <130     Past Surgical History:   Procedure Laterality Date     COLONOSCOPY       COLONOSCOPY N/A 4/4/2017    Procedure: COLONOSCOPY;  Surgeon: Yaakov Middleton MD;  Location: [facility]     PHACOEMULSIFICATION CLEAR CORNEA WITH STANDARD INTRAOCULAR LENS IMPLANT  9/5/2012    Procedure: PHACOEMULSIFICATION CLEAR CORNEA WITH STANDARD INTRAOCULAR LENS IMPLANT;  RIGHT PHACOEMULSIFCATION CLEAR CORNEA WITH STANDARD INTRAOCULAR LENS IMPLANT ;  Surgeon: Beto Gaston MD;  Location: [facility]     PHACOEMULSIFICATION CLEAR CORNEA WITH STANDARD INTRAOCULAR LENS IMPLANT  10/31/2012    Procedure: PHACOEMULSIFICATION CLEAR CORNEA WITH STANDARD INTRAOCULAR LENS IMPLANT;  LEFT PHACOEMULSIFICATION CLEAR CORNEA WITH STANDARD INTRAOCULAR LENS IMPLANT ;  Surgeon: Beto Gaston MD;  Location: [facility]        Social History     Tobacco Use     Smoking status: Former Smoker     Packs/day: 0.10     Years: 3.00     Pack years: 0.30     Smokeless tobacco: Never Used   Substance Use Topics     Alcohol use: No     Family History   Problem Relation Age of Onset     Family History Negative Mother      Family History Negative Father      Diabetes No family hx of      Coronary Artery Disease No family hx of      Hypertension No family hx of      Hyperlipidemia No family hx of      Cerebrovascular Disease No family hx of      Breast Cancer No family hx of      Colon Cancer No family hx of      Prostate Cancer No family hx of      Other Cancer No family hx of      Depression No family hx of      Anxiety Disorder No family hx of      Mental Illness No family hx of      Substance Abuse No family hx of      Anesthesia Reaction No family hx of      Asthma No family hx of      Osteoporosis No family hx of      Genetic Disorder No family hx of      Thyroid Disease No family hx of      Obesity No family hx of      Unknown/Adopted No family hx of          Current Outpatient Medications   Medication Sig Dispense Refill     clobetasol (TEMOVATE) 0.05 % external cream Apply topically 2 times daily 60 g 11     diltiazem ER COATED BEADS (CARTIA XT) 180 MG 24 hr capsule Take 1 capsule (180 mg) by mouth daily 90 capsule 3     loratadine (CLARITIN) 10 MG tablet TK 1 T PO HS TO HELP WITH ALLERGIES AND POST NASAL DRAINAGE  12     meclizine (ANTIVERT) 25 MG tablet Take 1 tablet (25 mg) by mouth every 6 hours as needed for dizziness 30 tablet 1     Allergies   Allergen Reactions     Amlodipine Besylate Swelling     Levaquin      Strange thoughts     Septra Ds [Sulfamethoxazole W-Trimethoprim]      [place] lousy         Review of Systems  Constitutional, HEENT, cardiovascular, pulmonary, gi and gu systems are negative, except as otherwise noted.    OBJECTIVE:   /71 (BP Location: Right arm, Patient Position: Sitting, Cuff Size: Adult Regular)    "Pulse 79   Temp 97.4  F (36.3  C) (Oral)   Ht 1.685 m (5' 6.34\")   Wt 75.7 kg (166 lb 14.4 oz)   SpO2 94%   Breastfeeding? No   BMI 26.66 kg/m   Estimated body mass index is 26.66 kg/m  as calculated from the following:    Height as of this encounter: 1.685 m (5' 6.34\").    Weight as of this encounter: 75.7 kg (166 lb 14.4 oz).  Physical Exam  GENERAL APPEARANCE: healthy, alert and no distress  EYES: Eyes grossly normal to inspection, PERRL and conjunctivae and sclerae normal  HENT: ear canals and TM's normal, nose and mouth without ulcers or lesions, oropharynx clear and oral mucous membranes moist  NECK: no adenopathy, no asymmetry, masses, or scars and thyroid normal to palpation  RESP: lungs clear to auscultation - no rales, rhonchi or wheezes  BREAST: normal without masses, tenderness or nipple discharge and no palpable axillary masses or adenopathy  CV: regular rate and rhythm, normal S1 S2, no S3 or S4, no murmur, click or rub, no peripheral edema and peripheral pulses strong  ABDOMEN: soft, nontender, no hepatosplenomegaly, no masses and bowel sounds normal  MS: no musculoskeletal defects are noted and gait is age appropriate without ataxia  SKIN: no suspicious lesions or rashes  NEURO: Normal strength and tone, sensory exam grossly normal, mentation intact and speech normal  PSYCH: mentation appears normal and affect normal/bright    Labs are pending     ASSESSMENT / PLAN:   1. Routine general medical examination at a health care facility      2. Benign essential hypertension  Under good control   - diltiazem ER COATED BEADS (CARTIA XT) 180 MG 24 hr capsule; Take 1 capsule (180 mg) by mouth daily  Dispense: 90 capsule; Refill: 3    3. Hyperlipidemia LDL goal <130    - Comprehensive metabolic panel  - CBC with platelets  - Lipid panel reflex to direct LDL Fasting    End of Life Planning:  Patient currently has an advanced directive: Yes.  Practitioner is supportive of decision.    COUNSELING:  Reviewed " "preventive health counseling, as reflected in patient instructions  Special attention given to:       Regular exercise       Healthy diet/nutrition       Immunizations      Flu shot today            Osteoporosis Prevention/Bone Health; had DEXA in 2012       Consider lung cancer screening for ages 55-80 years and 30 pack-year smoking history ; 3 pack year history        Colon cancer screening; had colonoscopy 4/2017; no further screening was recommended     Estimated body mass index is 26.66 kg/m  as calculated from the following:    Height as of this encounter: 1.685 m (5' 6.34\").    Weight as of this encounter: 75.7 kg (166 lb 14.4 oz).    Weight management plan: Discussed healthy diet and exercise guidelines     reports that she has quit smoking. She has a 0.30 pack-year smoking history. She has never used smokeless tobacco.      Appropriate preventive services were discussed with this patient, including applicable screening as appropriate for cardiovascular disease, diabetes, osteopenia/osteoporosis, and glaucoma.  As appropriate for age/gender, discussed screening for colorectal cancer, prostate cancer, breast cancer, and cervical cancer. Checklist reviewing preventive services available has been given to the patient.    Reviewed patients plan of care and provided an AVS. The Basic Care Plan (routine screening as documented in Health Maintenance) for Carolee meets the Care Plan requirement. This Care Plan has been established and reviewed with the Patient.    Counseling Resources:  ATP IV Guidelines  Pooled Cohorts Equation Calculator  Breast Cancer Risk Calculator  FRAX Risk Assessment  ICSI Preventive Guidelines  Dietary Guidelines for Americans, 2010  USDA's MyPlate  ASA Prophylaxis  Lung CA Screening    Kristian Gupta MD  [facility]    Identified Health Risks:  "

## 2019-09-09 NOTE — LETTER
"[facility]  [address] [facility]  [address]  [place]  [address]  Tel: [phone]    September 10, 2019    Carolee Doherty  [address]   [facility] [address]        Dear Ms. Doherty,    The following letter pertains to your most recent diagnostic tests:    -Your total cholesterol is 213 which is just above your goal of total cholesterol less than 200.    -Your triglycerides are 110 which are at your goal of triglycerides less than 150.    -Your HDL or \"good cholesterol\" is 59 which is at your goal of HDL cholesterol greater than 50.    -Your LDL cholesterol or \"bad cholesterol\" is 132 which is just above your goal of LDL cholesterol less than <130.  Your LDL goal is based on your risk factors for artery disease.     -Liver and gallbladder tests are normal for you. (ALT,AST, Alk phos, bilirubin), kidney function is normal for you (Creatinine, GFR), Sodium is normal, Potassium is normal for you, Calcium is normal for you, Glucose (blood sugar) is normal for you.      -Your complete blood counts including your hemoglobin returned normal for you.         Bottom line:  Cholesterol is just a wiff elevated.  I recommend watching your diet to improve this.  You can reduce your total and LDL cholesterol levels by eating less saturated fats.  This means you should eat less fried foods and meat.  If you eat meat, you should try to eat more chicken and fish and less beef or pork.  Also, you should increase dietary fiber intake by eating more fruits, vegetables and whole grains.  A diet high in fiber reduces total and LDL cholesterol levels. We can recheck in one year.         Follow up:  Schedule an appointment for a physical examination with fasting blood tests in one year's time, or return sooner if new questions, symptoms or problems arise.       Sincerely,    Dr. Gupta/SML        Enclosure: Lab Results  Results for orders placed or performed in visit on 09/09/19   Comprehensive metabolic panel   Result " Value Ref Range    Sodium 140 133 - 144 mmol/L    Potassium 3.8 3.4 - 5.3 mmol/L    Chloride 109 94 - 109 mmol/L    Carbon Dioxide 26 20 - 32 mmol/L    Anion Gap 5 3 - 14 mmol/L    Glucose 96 70 - 99 mg/dL    Urea Nitrogen 20 7 - 30 mg/dL    Creatinine 0.72 0.52 - 1.04 mg/dL    GFR Estimate 80 >60 mL/min/[1.73_m2]    GFR Estimate If Black >90 >60 mL/min/[1.73_m2]    Calcium 9.0 8.5 - 10.1 mg/dL    Bilirubin Total 0.5 0.2 - 1.3 mg/dL    Albumin 3.7 3.4 - 5.0 g/dL    Protein Total 8.0 6.8 - 8.8 g/dL    Alkaline Phosphatase 98 40 - 150 U/L    ALT 30 0 - 50 U/L    AST 24 0 - 45 U/L   CBC with platelets   Result Value Ref Range    WBC 5.9 4.0 - 11.0 10e9/L    RBC Count 5.09 3.8 - 5.2 10e12/L    Hemoglobin 15.3 11.7 - 15.7 g/dL    Hematocrit 44.9 35.0 - 47.0 %    MCV 88 78 - 100 fl    MCH 30.1 26.5 - 33.0 pg    MCHC 34.1 31.5 - 36.5 g/dL    RDW 14.6 10.0 - 15.0 %    Platelet Count 270 150 - 450 10e9/L   Lipid panel reflex to direct LDL Fasting   Result Value Ref Range    Cholesterol 213 (H) <200 mg/dL    Triglycerides 110 <150 mg/dL    HDL Cholesterol 59 >49 mg/dL    LDL Cholesterol Calculated 132 (H) <100 mg/dL    Non HDL Cholesterol 154 (H) <130 mg/dL

## 2019-09-10 NOTE — RESULT ENCOUNTER NOTE
"The following letter pertains to your most recent diagnostic tests:    -Your total cholesterol is 213 which is just above your goal of total cholesterol less than 200.    -Your triglycerides are 110 which are at your goal of triglycerides less than 150.    -Your HDL or \"good cholesterol\" is 59 which is at your goal of HDL cholesterol greater than 50.    -Your LDL cholesterol or \"bad cholesterol\" is 132 which is just above your goal of LDL cholesterol less than <130.  Your LDL goal is based on your risk factors for artery disease.     -Liver and gallbladder tests are normal for you. (ALT,AST, Alk phos, bilirubin), kidney function is normal for you (Creatinine, GFR), Sodium is normal, Potassium is normal for you, Calcium is normal for you, Glucose (blood sugar) is normal for you.      -Your complete blood counts including your hemoglobin returned normal for you.         Bottom line:  Cholesterol is just a wiff elevated.  I recommend watching your diet to improve this.  You can reduce your total and LDL cholesterol levels by eating less saturated fats.  This means you should eat less fried foods and meat.  If you eat meat, you should try to eat more chicken and fish and less beef or pork.  Also, you should increase dietary fiber intake by eating more fruits, vegetables and whole grains.  A diet high in fiber reduces total and LDL cholesterol levels. We can recheck in one year.         Follow up:  Schedule an appointment for a physical examination with fasting blood tests in one year's time, or return sooner if new questions, symptoms or problems arise.       Sincerely,    Dr. Gupta"

## 2020-09-08 DIAGNOSIS — I10 BENIGN ESSENTIAL HYPERTENSION: ICD-10-CM

## 2020-09-10 RX ORDER — DILTIAZEM HYDROCHLORIDE 180 MG/1
CAPSULE, EXTENDED RELEASE ORAL
Qty: 90 CAPSULE | Refills: 0 | Status: SHIPPED | OUTPATIENT
Start: 2020-09-10 | End: 2020-09-11

## 2020-09-10 NOTE — TELEPHONE ENCOUNTER
Routing refill request to provider for review/approval because:  Drug interaction warning  Pt due for apt also- added in pharm comments to schedule.  Please authorize if appropriate.  Thanks,  Elena Napoles RN

## 2020-09-11 ENCOUNTER — TELEPHONE (OUTPATIENT)
Dept: FAMILY MEDICINE | Facility: CLINIC | Age: 80
End: 2020-09-11

## 2020-09-11 DIAGNOSIS — I10 BENIGN ESSENTIAL HYPERTENSION: ICD-10-CM

## 2020-09-11 RX ORDER — DILTIAZEM HYDROCHLORIDE 180 MG/1
CAPSULE, EXTENDED RELEASE ORAL
Qty: 90 CAPSULE | Refills: 0 | Status: SHIPPED | OUTPATIENT
Start: 2020-09-11 | End: 2021-11-08

## 2020-09-11 NOTE — TELEPHONE ENCOUNTER
Reason for Call:  Medication or medication refill:    Do you use a [facility]?  Name of the pharmacy and phone number for the current request:   [id number] - [place], [place] - [address] [place] [address] [address]    Name of the medication requested: NOT A REFILL    Other request: Patient needs help with her CARTIA rx--she says she received a substitute instead of the name brand CARTIA, and she has reactions to the substitute.     Can we leave a detailed message on this number? YES    Phone number patient can be reached at: Cell number on file:    Telephone Information:   Mobile [phone]       Best Time: ANY    Call taken on 9/11/2020 at 3:27 PM by Calli Castillo

## 2020-09-11 NOTE — TELEPHONE ENCOUNTER
TO PCP:     Cartia Rx recently sent but FELY box was not checked     Pended new Rx for FELY - pt requesting as she cannot take the generic     Griselda PARK RN

## 2020-11-13 ENCOUNTER — TRANSFERRED RECORDS (OUTPATIENT)
Dept: HEALTH INFORMATION MANAGEMENT | Facility: CLINIC | Age: 80
End: 2020-11-13

## 2021-02-01 DIAGNOSIS — I10 BENIGN ESSENTIAL HYPERTENSION: ICD-10-CM

## 2021-02-02 RX ORDER — DILTIAZEM HYDROCHLORIDE 180 MG/1
CAPSULE, COATED, EXTENDED RELEASE ORAL
Qty: 30 CAPSULE | Refills: 0 | Status: SHIPPED | OUTPATIENT
Start: 2021-02-02 | End: 2021-03-05

## 2021-02-02 NOTE — TELEPHONE ENCOUNTER
Pharmacy states Cartia 180mg is not available but Diltiazem ER 180mg is, pharmacy requesting new RX for the Diltiazem     Previous RX:  Disp Refills Start End FELY    CARTIA  MG 24 hr capsule 90 capsule 0 9/11/2020  Yes   Sig: TAKE 1 CAPSULE(180 MG) BY MOUTH DAILY

## 2021-03-01 ENCOUNTER — IMMUNIZATION (OUTPATIENT)
Dept: PEDIATRICS | Facility: CLINIC | Age: 81
End: 2021-03-01
Payer: COMMERCIAL

## 2021-03-01 PROCEDURE — 91300 PR COVID VAC PFIZER DIL RECON 30 MCG/0.3 ML IM: CPT

## 2021-03-01 PROCEDURE — 0001A PR COVID VAC PFIZER DIL RECON 30 MCG/0.3 ML IM: CPT

## 2021-03-03 DIAGNOSIS — I10 BENIGN ESSENTIAL HYPERTENSION: ICD-10-CM

## 2021-03-05 RX ORDER — DILTIAZEM HYDROCHLORIDE 180 MG/1
CAPSULE, COATED, EXTENDED RELEASE ORAL
Qty: 30 CAPSULE | Refills: 0 | Status: SHIPPED | OUTPATIENT
Start: 2021-03-05 | End: 2021-03-10

## 2021-03-05 NOTE — TELEPHONE ENCOUNTER
Routing refill request to provider for review/approval because:  Last seen 9/2019, currently getting vaccines, sent mc to schedule  Debbie Falcon, RN  [facility] RN Triage Team

## 2021-03-10 ENCOUNTER — VIRTUAL VISIT (OUTPATIENT)
Dept: FAMILY MEDICINE | Facility: CLINIC | Age: 81
End: 2021-03-10
Payer: COMMERCIAL

## 2021-03-10 DIAGNOSIS — I10 BENIGN ESSENTIAL HYPERTENSION: ICD-10-CM

## 2021-03-10 PROCEDURE — 99213 OFFICE O/P EST LOW 20 MIN: CPT | Mod: 95 | Performed by: INTERNAL MEDICINE

## 2021-03-10 RX ORDER — DILTIAZEM HYDROCHLORIDE 180 MG/1
CAPSULE, COATED, EXTENDED RELEASE ORAL
Qty: 90 CAPSULE | Refills: 3 | Status: SHIPPED | OUTPATIENT
Start: 2021-03-10 | End: 2022-03-28

## 2021-03-10 NOTE — PROGRESS NOTES
Carolee is a 80 year old who is being evaluated via a billable video visit.      How would you like to obtain your AVS? Mail a copy  If the video visit is dropped, the invitation should be resent by: Text to cell phone: [phone]  Will anyone else be joining your video visit? No    Video Start Time: 3:34 PM    Assessment & Plan     Benign essential hypertension  Well controlled on current diltiazem  Overdue for visit but she prefers to wait until she is full vaccinated   - diltiazem ER COATED BEADS (CARTIA XT) 180 MG 24 hr capsule; TAKE 1 CAPSULE BY MOUTH EVERY DAY.      20 minutes spent on the date of the encounter doing chart review, history and exam, documentation and further activities as noted above           No follow-ups on file.    Kristian Gupta MD  [facility]   Carolee is a 80 year old who presents for the following health issues     HPI       Chief Complaint   Patient presents with     Medication Follow-up     diltiazem ER COATED BEADS (CARTIA XT) 180 MG 24 hr capsule       Needs refill on diltiazem  She got a rash from one formulation, but her pharmacist helped her find one that she tolerates better  Her rash has resolved  She otherwise feels well  Review of Systems   Constitutional, HEENT, cardiovascular, pulmonary, gi and gu systems are negative, except as otherwise noted.      Objective    Vitals - Patient Reported  Systolic (Patient Reported): 133  Diastolic (Patient Reported): 77  Weight (Patient Reported): 72.6 kg (160 lb)  Pulse (Patient Reported): 62      Vitals:  No vitals were obtained today due to virtual visit.    Physical Exam   GENERAL: Healthy, alert and no distress  EYES: Eyes grossly normal to inspection.  No discharge or erythema, or obvious scleral/conjunctival abnormalities.  RESP: No audible wheeze, cough, or visible cyanosis.  No visible retractions or increased work of breathing.    SKIN: Visible skin clear. No significant rash, abnormal  pigmentation or lesions.  NEURO: Cranial nerves grossly intact.  Mentation and speech appropriate for age.  PSYCH: Mentation appears normal, affect normal/bright, judgement and insight intact, normal speech and appearance well-groomed.                Video-Visit Details    Type of service:  Video Visit    Video End Time:3:51 PM    Originating Location (pt. Location): Home    Distant Location (provider location):  [facility]     Platform used for Video Visit: Jayjay

## 2021-03-14 ENCOUNTER — HEALTH MAINTENANCE LETTER (OUTPATIENT)
Age: 81
End: 2021-03-14

## 2021-03-22 ENCOUNTER — IMMUNIZATION (OUTPATIENT)
Dept: PEDIATRICS | Facility: CLINIC | Age: 81
End: 2021-03-22
Attending: INTERNAL MEDICINE
Payer: COMMERCIAL

## 2021-03-22 PROCEDURE — 91300 PR COVID VAC PFIZER DIL RECON 30 MCG/0.3 ML IM: CPT

## 2021-03-22 PROCEDURE — 0002A PR COVID VAC PFIZER DIL RECON 30 MCG/0.3 ML IM: CPT

## 2021-10-07 ENCOUNTER — OFFICE VISIT (OUTPATIENT)
Dept: FAMILY MEDICINE | Facility: CLINIC | Age: 81
End: 2021-10-07
Payer: COMMERCIAL

## 2021-10-07 VITALS
RESPIRATION RATE: 16 BRPM | TEMPERATURE: 97.6 F | DIASTOLIC BLOOD PRESSURE: 70 MMHG | HEART RATE: 88 BPM | SYSTOLIC BLOOD PRESSURE: 124 MMHG | OXYGEN SATURATION: 96 % | WEIGHT: 167 LBS | HEIGHT: 67 IN | BODY MASS INDEX: 26.21 KG/M2

## 2021-10-07 DIAGNOSIS — L02.31 ABSCESS OF BUTTOCK, RIGHT: Primary | ICD-10-CM

## 2021-10-07 DIAGNOSIS — N90.7 LABIAL CYST: ICD-10-CM

## 2021-10-07 DIAGNOSIS — R21 RASH: ICD-10-CM

## 2021-10-07 PROCEDURE — 99214 OFFICE O/P EST MOD 30 MIN: CPT | Performed by: NURSE PRACTITIONER

## 2021-10-07 ASSESSMENT — MIFFLIN-ST. JEOR: SCORE: 1257.2

## 2021-10-07 ASSESSMENT — PAIN SCALES - GENERAL: PAINLEVEL: NO PAIN (0)

## 2021-10-07 NOTE — PROGRESS NOTES
Assessment & Plan   Problem List Items Addressed This Visit     None      Visit Diagnoses     Abscess of buttock, right    -  Primary    Labial cyst        Relevant Orders    Ob/Gyn Referral    Rash             Patient presents with multiple complaints, including a new bump on right gluteal fold, an itchy rash throughout her body, and a vaginal cyst that has grown in size.   Ob/Gyn referral given for chronic vaginal cyst that has an abnormal appearance today   Bump on right gluteal fold appears to be an abscess - encouraged warm baths and compresses. It is too small at this time to do an I&D.  Rash appears to be drug related and she has had this for some time since switching from Cartia XT to diltiazem. Suggested Sarna lotion to ease itching. The rash appears to be improving. She can f/u with Dr. Gupta for management of diltiazem.      Reid Read, APRSHASHI [facility] [place] Zarco is a 81 year old who presents for the following health issues    HPI Painful Lump in right buttock noticed 4 days ago.     Rash  Onset/Duration: Rash started july  Description  Location: All over back  Character: raised, red  Itching: moderate  Intensity:  moderate  Progression of Symptoms:  same  Accompanying signs and symptoms:   Fever: no  Body aches or joint pain: no  Sore throat symptoms: no  Recent cold symptoms: no  History:           Previous episodes of similar rash: None  New exposures:  None  Recent travel: no  Exposure to similar rash: no  Precipitating or alleviating factors: none  Therapies tried and outcome: none      Rash: patchy scattered. New rashes keep popping up then resolve. Very itchy at times, but comes and goes. Believes it's being caused by her Diltiazem. Difficult to obtain direct history of when it started, but she states it started with Diltiazem initiation. She has had trouble with Diltiazem med side effects in the past and has needed a different  supply.  "    On Monday, she noticed a lump in her right gluteal fold. Denies pain, but some tenderness in the area. Denies fevers or other illnesses.     She also complains of a vaginal cyst that she has noticed t least 1.5 years ago and was told by her PCP to go to OBGYN. She has not, and has noticed that the cyst is getting bigger.     Review of Systems   Detailed as above.       Objective    /70   Pulse 88   Temp 97.6  F (36.4  C) (Tympanic)   Resp 16   Ht 1.689 m (5' 6.5\")   Wt 75.8 kg (167 lb)   SpO2 96%   BMI 26.55 kg/m    Body mass index is 26.55 kg/m .  Physical Exam  Pulmonary:      Effort: Pulmonary effort is normal.   Genitourinary:     Labia:         Left: Lesion present.       Comments: Enlarged lesion on left anterior labia majora.   Skin:     General: Skin is warm and dry.      Findings: Abscess and rash present. Rash is macular.      Comments: About marble sized area of induration to right gluteal fold with overlying erythema that extends about 3cm from lesion. Pinpoint central reyez.   Scattered Macular dry rash throughout body.    Neurological:      General: No focal deficit present.      Mental Status: She is alert.   Psychiatric:         Mood and Affect: Mood normal.              I saw this patient in collaboration with Malena Palacios NP Student.       I was present with the APRN/PA student who participated in the service and in the documentation of the services provided. I have verified the history and personally performed the physical exam and medical decision making, as documented by the student and edited by me.     Reid Read, APRN, CNP    Malena Palacios NP Student      "

## 2021-11-08 ENCOUNTER — OFFICE VISIT (OUTPATIENT)
Dept: OBGYN | Facility: CLINIC | Age: 81
End: 2021-11-08
Payer: COMMERCIAL

## 2021-11-08 VITALS
WEIGHT: 166 LBS | SYSTOLIC BLOOD PRESSURE: 148 MMHG | DIASTOLIC BLOOD PRESSURE: 70 MMHG | BODY MASS INDEX: 26.06 KG/M2 | HEIGHT: 67 IN

## 2021-11-08 DIAGNOSIS — N76.4 LABIAL ABSCESS: Primary | ICD-10-CM

## 2021-11-08 PROCEDURE — 88305 TISSUE EXAM BY PATHOLOGIST: CPT | Performed by: PATHOLOGY

## 2021-11-08 PROCEDURE — 87070 CULTURE OTHR SPECIMN AEROBIC: CPT | Performed by: OBSTETRICS & GYNECOLOGY

## 2021-11-08 PROCEDURE — 99203 OFFICE O/P NEW LOW 30 MIN: CPT | Mod: 25 | Performed by: OBSTETRICS & GYNECOLOGY

## 2021-11-08 PROCEDURE — 87077 CULTURE AEROBIC IDENTIFY: CPT | Performed by: OBSTETRICS & GYNECOLOGY

## 2021-11-08 PROCEDURE — 87076 CULTURE ANAEROBE IDENT EACH: CPT | Performed by: OBSTETRICS & GYNECOLOGY

## 2021-11-08 PROCEDURE — 87205 SMEAR GRAM STAIN: CPT | Performed by: OBSTETRICS & GYNECOLOGY

## 2021-11-08 PROCEDURE — 56405 I&D VULVA/PERINEAL ABSCESS: CPT | Performed by: OBSTETRICS & GYNECOLOGY

## 2021-11-08 ASSESSMENT — MIFFLIN-ST. JEOR: SCORE: 1252.66

## 2021-11-08 NOTE — PROGRESS NOTES
SUBJECTIVE:                                                   Carolee Doherty is a 81 year old female who presents to clinic today for the following health issue(s):  Patient presents with:  Vaginal Problem: patient has a vaginal bump that has been present for many years, and patient feels that it has changed.      HPI:  Has been present for about 15 years.  On left labia  Soft and small and barely noticeable.  But then in /Feb started to feel different.  Feels that it may have enlarged.  Somewhat firmer.  Bright pink in color.  No bleeding or discharge  No itching or pain.  Has been working on weight loss.  Pre-Covid was 173, now down to 166.    No LMP recorded. Patient is postmenopausal..   Patient is sexually active,    reports that she has quit smoking. She has a 0.30 pack-year smoking history. She has never used smokeless tobacco.  Health maintenance updated:  yes    Today's PHQ-2 Score:   PHQ-2 (  [employer]) 3/10/2021   Q1: Little interest or pleasure in doing things 0   Q2: Feeling down, depressed or hopeless 0   PHQ-2 Score 0     Today's PHQ-9 Score: No flowsheet data found.  Today's JUAN-7 Score: No flowsheet data found.    Problem list and histories reviewed & adjusted, as indicated.  Additional history: as documented.    Patient Active Problem List   Diagnosis     Benign essential hypertension     Heart murmur     Localized edema     Hyperlipidemia LDL goal <130     Past Surgical History:   Procedure Laterality Date     COLONOSCOPY       COLONOSCOPY N/A 2017    Procedure: COLONOSCOPY;  Surgeon: Yaakov Middleton MD;  Location:  GI     PHACOEMULSIFICATION CLEAR CORNEA WITH STANDARD INTRAOCULAR LENS IMPLANT  2012    Procedure: PHACOEMULSIFICATION CLEAR CORNEA WITH STANDARD INTRAOCULAR LENS IMPLANT;  RIGHT PHACOEMULSIFCATION CLEAR CORNEA WITH STANDARD INTRAOCULAR LENS IMPLANT ;  Surgeon: Beto Gaston MD;  Location:  EC     PHACOEMULSIFICATION CLEAR CORNEA WITH STANDARD INTRAOCULAR LENS  "IMPLANT  10/31/2012    Procedure: PHACOEMULSIFICATION CLEAR CORNEA WITH STANDARD INTRAOCULAR LENS IMPLANT;  LEFT PHACOEMULSIFICATION CLEAR CORNEA WITH STANDARD INTRAOCULAR LENS IMPLANT ;  Surgeon: Beto Gaston MD;  Location: [facility]      Social History     Tobacco Use     Smoking status: Former Smoker     Packs/day: 0.10     Years: 3.00     Pack years: 0.30     Smokeless tobacco: Never Used   Substance Use Topics     Alcohol use: No      Problem (# of Occurrences) Relation (Name,Age of Onset)    Family History Negative (2) Mother, Father       Negative family history of: Diabetes, Coronary Artery Disease, Hypertension, Hyperlipidemia, Cerebrovascular Disease, Breast Cancer, Colon Cancer, Prostate Cancer, Other Cancer, Depression, Anxiety Disorder, Mental Illness, Substance Abuse, Anesthesia Reaction, Asthma, Osteoporosis, Genetic Disorder, Thyroid Disease, Obesity, Unknown/Adopted            Current Outpatient Medications   Medication Sig     amoxicillin-clavulanate (AUGMENTIN) 875-125 MG tablet Take 1 tablet by mouth 2 times daily for 5 days     clobetasol (TEMOVATE) 0.05 % external cream Apply topically 2 times daily     diltiazem ER COATED BEADS (CARTIA XT) 180 MG 24 hr capsule TAKE 1 CAPSULE BY MOUTH EVERY DAY.     loratadine (CLARITIN) 10 MG tablet TK 1 T PO HS TO HELP WITH ALLERGIES AND POST NASAL DRAINAGE     meclizine (ANTIVERT) 25 MG tablet Take 1 tablet (25 mg) by mouth every 6 hours as needed for dizziness     No current facility-administered medications for this visit.     Allergies   Allergen Reactions     Amlodipine Besylate Swelling     Levaquin      Strange thoughts     Septra Ds [Sulfamethoxazole W-Trimethoprim]      Felt lousy       ROS:  12 point review of systems negative other than symptoms noted below or in the HPI.  No urinary frequency or dysuria, bladder or kidney problems      OBJECTIVE:     BP (!) 148/70   Ht 1.689 m (5' 6.5\")   Wt 75.3 kg (166 lb)   BMI 26.39 kg/m    Body mass index " is 26.39 kg/m .    Exam:  Constitutional:  Appearance: Well nourished, well developed alert, in no acute distress  Chest:  Respiratory Effort:  Breathing unlabored. Clear to auscultation bilaterally.   Cardiovascular: Heart: Auscultation:  Regular rate, normal rhythm, no murmurs present  Neurologic:  Mental Status:  Oriented X3.  Normal strength and tone, sensory exam grossly normal, mentation intact and speech normal.    Psychiatric:  Mentation appears normal and affect normal/bright.  Pelvic Exam:  External Genitalia:     Normal appearance for age, no discharge present, left labia majora with pink 1cm x 2cm mildly fluctuant mass non tender to palpation with noted creamy white pus expressed (see procedure note below), mild tenderness to light palpation of right labia minora, no inflammatory lesions present, color normal  Vagina:     Normal vaginal vault without central or paravaginal defects, no discharge present, no inflammatory lesions present, no masses present  Perineum:     Perineum within normal limits, no evidence of trauma, no rashes or skin lesions present  Anus:     Anus within normal limits, no hemorrhoids present  Inguinal Lymph Nodes:     No lymphadenopathy present bilaterally.  Pubic Hair:     Normal pubic hair distribution for age  Genitalia and Groin:     No rashes present, no lesions present, no areas of discoloration, no masses present       In-Clinic Test Results:  No results found for this or any previous visit (from the past 24 hour(s)).       Incision and drainage of Left Labial Abscess    I&D performed on left labial abscess. Using sterile technique, area cleaned with betadine x3. 2 ml 1% lidocaine injected to anesthetize abscess.  Blunt incision with #11 blade made centrally on abscess, with expulsion of white curdy contents under direct bilateral squeezing pressure till abscess drainage. Culture of contents obtained.  Tissue bx also obtained using #11 blade from external surface of abscess.  Bleeding controlled with direct pressure and silver nitrate . Hemostasis achieved. Patient tolerated procedure well.             ASSESSMENT/PLAN:                                                        ICD-10-CM    1. Labial abscess  N76.4 amoxicillin-clavulanate (AUGMENTIN) 875-125 MG tablet     Surgical Pathology Exam     Abscess Aerobic Bacterial Culture Routine with Gram Stain       There are no Patient Instructions on file for this visit.    - History and exam most consistent with left labial abscess or inclusion cyst given 15 year duration of existance and lack of other findings concerning for malignancy. Pus culture and tissue biopsy was obtained.  -  Incision and drainage performed on left labial abscess (see procedure note above for details.)  - Will start patient on 5 day course of oral Augmentin   - Follow up in 1 week via phone visit     Madhuri Bonilla MS3    Physician Attestation   I, Tricia Curtis, was present with the medical/MARCO A student who participated in the service and in the documentation of the note.  I have verified the history and personally performed the physical exam and medical decision making.  I agree with the assessment and plan of care as documented in the note.      I personally reviewed vital signs, medications and labs.    Tricia Curtis MD  Date of Service (when I saw the patient): 11/08/21     (20 minutes was spent on the date of the encounter doing chart review, review of outside records, review and interpretation of pertinent test results, history and exam, documentation, patient counseling, and further activities as noted above in addition to the time spent on the actual procedure)      Tricia Curtis MD  [facility]

## 2021-11-08 NOTE — PROCEDURES
Incision and drainage of Left Labial Abscess    I&D performed on left labial abscess. Using sterile technique, area cleaned with betadine x3. 2 ml 1% lidocaine injected to anesthetize abscess.  Blunt incision with #11 blade made centrally on abscess, with expulsion of white curdy contents under direct bilateral squeezing pressure till abscess drainage. Culture of contents obtained.  Tissue bx also obtained using #11 blade from external surface of abscess. Bleeding controlled with direct pressure and silver nitrate . Hemostasis achieved. Patient tolerated procedure well.

## 2021-11-10 LAB
PATH REPORT.COMMENTS IMP SPEC: NORMAL
PATH REPORT.COMMENTS IMP SPEC: NORMAL
PATH REPORT.FINAL DX SPEC: NORMAL
PATH REPORT.GROSS SPEC: NORMAL
PATH REPORT.MICROSCOPIC SPEC OTHER STN: NORMAL
PATH REPORT.RELEVANT HX SPEC: NORMAL
PHOTO IMAGE: NORMAL

## 2021-11-16 LAB
BACTERIA ABSC ANAEROBE+AEROBE CULT: ABNORMAL
GRAM STAIN RESULT: ABNORMAL

## 2022-03-03 ENCOUNTER — OFFICE VISIT (OUTPATIENT)
Dept: OTHER | Facility: CLINIC | Age: 82
End: 2022-03-03
Payer: COMMERCIAL

## 2022-03-03 VITALS
HEART RATE: 85 BPM | DIASTOLIC BLOOD PRESSURE: 72 MMHG | SYSTOLIC BLOOD PRESSURE: 138 MMHG | WEIGHT: 163 LBS | TEMPERATURE: 98.7 F | HEIGHT: 67 IN | OXYGEN SATURATION: 95 % | BODY MASS INDEX: 25.58 KG/M2

## 2022-03-03 DIAGNOSIS — Z00.00 ENCOUNTER FOR MEDICARE ANNUAL WELLNESS EXAM: Primary | ICD-10-CM

## 2022-03-03 DIAGNOSIS — I10 BENIGN ESSENTIAL HYPERTENSION: ICD-10-CM

## 2022-03-03 PROCEDURE — G0438 PPPS, INITIAL VISIT: HCPCS | Performed by: FAMILY MEDICINE

## 2022-03-03 RX ORDER — DILTIAZEM HYDROCHLORIDE 180 MG/1
CAPSULE, COATED, EXTENDED RELEASE ORAL
Qty: 90 CAPSULE | Refills: 0 | Status: CANCELLED | OUTPATIENT
Start: 2022-03-03

## 2022-03-03 ASSESSMENT — ACTIVITIES OF DAILY LIVING (ADL): CURRENT_FUNCTION: NO ASSISTANCE NEEDED

## 2022-03-03 NOTE — PROGRESS NOTES
"Assessment & Plan     ICD-10-CM    1. Encounter for Medicare annual wellness exam  Z00.00        Follow Up/Next Steps  Return in about 53 weeks (around 3/9/2023) for Annual Wellness Visit.  Recommended that patient follow up with PCP for care of chronic conditions and regular health maintenance.    Counseling and Education  Reviewed preventive health counseling, as reflected in patient instructions      BMI:   Estimated body mass index is 25.53 kg/m  as calculated from the following:    Height as of this encounter: 1.702 m (5' 7\").    Weight as of this encounter: 73.9 kg (163 lb).         Appropriate preventive services were discussed with this patient, including applicable screening as appropriate for cardiovascular disease, diabetes, osteopenia/osteoporosis, and glaucoma.  As appropriate for age/gender, discussed screening for colorectal cancer, prostate cancer, breast cancer, and cervical cancer. Checklist reviewing preventive services available has been given to the patient.    Reviewed patients plan of care and provided an AVS. The Basic Care Plan (routine screening as documented in Health Maintenance) for Carolee meets the Care Plan requirement. This Care Plan has been established and reviewed with the Patient.    Visit Provider: Suellen Nguyen RN  Supervising Provider: Geraldine Broderick MD, MD  [facility]       Subjective   Carolee is a 81 year old who is being seen for an Annual Wellness Visit in her home.    Healthy Habits:   PHQ-2 Total Score: 0    Do you feel safe in your environment? Yes    Have you ever done Advance Care Planning? (For example, a Health Directive, POLST, or a discussion with a medical provider or your loved ones about your wishes): Yes, patient states has an Advance Care Planning document and will bring a copy to the clinic.    Fall risk  Fallen 2 or more times in the past year?: No  Any fall with injury in the past year?: No  Cognitive " "Screening   1) Repeat 3 items (Leader, Season, Table)    2) Clock draw: NORMAL  3) 3 item recall: Recalls 3 objects  Results: 3 items recalled: COGNITIVE IMPAIRMENT LESS LIKELY    Mini-CogTM Copyright S Sam. Licensed by the author for use in [facility]; reprinted with permission (randall@.[facility]). All rights reserved.      Do you have sleep apnea, excessive snoring or daytime drowsiness?: no    Reviewed and updated as needed this visit by clinical staff   Tobacco  Allergies  Meds  Problems  Med Hx  Surg Hx  Fam Hx  Soc   Hx        Social History     Tobacco Use     Smoking status: Former Smoker     Packs/day: 0.10     Years: 3.00     Pack years: 0.30     Smokeless tobacco: Never Used   Substance Use Topics     Alcohol use: No       Current providers sharing in care for this patient include:   Patient Care Team:  Kristian Gupta MD as PCP - General (Internal Medicine)  rKistian Gupta MD as Assigned PCP  Tricia Curtis MD as Assigned OBGYN Provider    The following health maintenance items are reviewed in Epic and correct as of today:  Health Maintenance Due   Topic Date Due     ANNUAL REVIEW OF  ORDERS  Never done     HEPATITIS C SCREENING  Never done     LUNG CANCER SCREENING  Never done     FALL RISK ASSESSMENT  03/10/2022       Mammogram Screening - Patient over age 75, has elected to continue with screening.  Pertinent mammograms are reviewed under the imaging tab.        Objective    /72 (BP Location: Left arm, Patient Position: Sitting)   Pulse 85   Temp 98.7  F (37.1  C) (Temporal)   Ht 1.702 m (5' 7\")   Wt 73.9 kg (163 lb)   SpO2 95%   BMI 25.53 kg/m   Estimated body mass index is 25.53 kg/m  as calculated from the following:    Height as of this encounter: 1.702 m (5' 7\").    Weight as of this encounter: 73.9 kg (163 lb).  Physical Exam  Patient appears comfortable and in no acute distress.        Identified Health Risks:  "

## 2022-03-03 NOTE — PATIENT INSTRUCTIONS
Patient Education   Personalized Prevention Plan  You are due for the preventive services outlined below.  Your care team is available to assist you in scheduling these services.  If you have already completed any of these items, please share that information with your care team to update in your medical record.  Health Maintenance Due   Topic Date Due     ANNUAL REVIEW OF HM ORDERS  Never done     Discuss Advance Care Planning  Never done     Hepatitis C Screening  Never done     LUNG CANCER SCREENING  Never done     FALL RISK ASSESSMENT  03/10/2022

## 2022-03-03 NOTE — TELEPHONE ENCOUNTER
Routing refill request to provider for review/approval because:  Labs not current:        Creatinine   Date Value Ref Range Status   09/09/2019 0.72 0.52 - 1.04 mg/dL Final     BP Readings from Last 3 Encounters:   03/03/22 138/72   11/08/21 (!) 148/70   10/07/21 124/70     Lab Results   Component Value Date    ALT 30 09/09/2019

## 2022-03-25 NOTE — TELEPHONE ENCOUNTER
Looks like this was filled for #90 earlier this month.  Do you need anything else from me?  THanks.

## 2022-03-25 NOTE — TELEPHONE ENCOUNTER
Patient states that her Medicare Wellness at her apartment. Scheduled physical with Dr. Bryan 4/247 for a physical and for lab only 4/4/22.    Patient states that she has medication through 4/7.    Please order labs. Thank you.  Krista Nails RN

## 2022-03-28 RX ORDER — DILTIAZEM HYDROCHLORIDE 180 MG/1
CAPSULE, COATED, EXTENDED RELEASE ORAL
Qty: 90 CAPSULE | Refills: 3 | Status: SHIPPED | OUTPATIENT
Start: 2022-03-28 | End: 2023-03-30

## 2022-03-28 NOTE — TELEPHONE ENCOUNTER
Rx last refilled in 2021     Pt does need Diltiazem refill, pended    Appointments in Next Year    Apr 04, 2022  9:15 AM  LAB with CS LAB  [facility] ([facility] ) [phone]   Apr 27, 2022  1:30 PM  (Arrive by 1:10 PM)  Adult Preventative Visit with Marcia Bryan MD  [facility] ([facility] ) [phone]        Frank Londono RN  [facility]

## 2022-04-04 ENCOUNTER — APPOINTMENT (OUTPATIENT)
Dept: LAB | Facility: CLINIC | Age: 82
End: 2022-04-04
Payer: COMMERCIAL

## 2022-04-27 ENCOUNTER — OFFICE VISIT (OUTPATIENT)
Dept: FAMILY MEDICINE | Facility: CLINIC | Age: 82
End: 2022-04-27
Payer: COMMERCIAL

## 2022-04-27 VITALS
BODY MASS INDEX: 26.33 KG/M2 | HEIGHT: 66 IN | WEIGHT: 163.8 LBS | DIASTOLIC BLOOD PRESSURE: 78 MMHG | TEMPERATURE: 97.2 F | RESPIRATION RATE: 20 BRPM | HEART RATE: 87 BPM | OXYGEN SATURATION: 96 % | SYSTOLIC BLOOD PRESSURE: 141 MMHG

## 2022-04-27 DIAGNOSIS — Z13.220 SCREENING FOR HYPERLIPIDEMIA: ICD-10-CM

## 2022-04-27 DIAGNOSIS — I10 BENIGN ESSENTIAL HYPERTENSION: Primary | ICD-10-CM

## 2022-04-27 DIAGNOSIS — R42 DIZZINESS: ICD-10-CM

## 2022-04-27 DIAGNOSIS — Z11.59 NEED FOR HEPATITIS C SCREENING TEST: ICD-10-CM

## 2022-04-27 LAB
ERYTHROCYTE [DISTWIDTH] IN BLOOD BY AUTOMATED COUNT: 14.9 % (ref 10–15)
HCT VFR BLD AUTO: 45.8 % (ref 35–47)
HGB BLD-MCNC: 15.6 G/DL (ref 11.7–15.7)
MCH RBC QN AUTO: 30 PG (ref 26.5–33)
MCHC RBC AUTO-ENTMCNC: 34.1 G/DL (ref 31.5–36.5)
MCV RBC AUTO: 88 FL (ref 78–100)
PLATELET # BLD AUTO: 271 10E3/UL (ref 150–450)
RBC # BLD AUTO: 5.2 10E6/UL (ref 3.8–5.2)
WBC # BLD AUTO: 8.6 10E3/UL (ref 4–11)

## 2022-04-27 PROCEDURE — 99213 OFFICE O/P EST LOW 20 MIN: CPT | Performed by: INTERNAL MEDICINE

## 2022-04-27 PROCEDURE — 85027 COMPLETE CBC AUTOMATED: CPT | Performed by: INTERNAL MEDICINE

## 2022-04-27 PROCEDURE — 80061 LIPID PANEL: CPT | Performed by: INTERNAL MEDICINE

## 2022-04-27 PROCEDURE — 80053 COMPREHEN METABOLIC PANEL: CPT | Performed by: INTERNAL MEDICINE

## 2022-04-27 PROCEDURE — 36415 COLL VENOUS BLD VENIPUNCTURE: CPT | Performed by: INTERNAL MEDICINE

## 2022-04-27 PROCEDURE — 86803 HEPATITIS C AB TEST: CPT | Performed by: INTERNAL MEDICINE

## 2022-04-27 RX ORDER — MECLIZINE HYDROCHLORIDE 25 MG/1
25 TABLET ORAL EVERY 6 HOURS PRN
Qty: 30 TABLET | Refills: 1 | Status: SHIPPED | OUTPATIENT
Start: 2022-04-27 | End: 2024-07-31

## 2022-04-27 RX ORDER — MECLIZINE HYDROCHLORIDE 25 MG/1
25 TABLET ORAL EVERY 6 HOURS PRN
Qty: 30 TABLET | Refills: 1 | Status: CANCELLED | OUTPATIENT
Start: 2022-04-27

## 2022-04-27 ASSESSMENT — ENCOUNTER SYMPTOMS
HEARTBURN: 0
WEAKNESS: 0
ARTHRALGIAS: 0
HEADACHES: 0
HEMATURIA: 0
JOINT SWELLING: 0
ABDOMINAL PAIN: 0
DIZZINESS: 1
SORE THROAT: 0
BREAST MASS: 0
PARESTHESIAS: 0
DIARRHEA: 0
DYSURIA: 0
NERVOUS/ANXIOUS: 0
NAUSEA: 0
CONSTIPATION: 0
HEMATOCHEZIA: 0
MYALGIAS: 0
COUGH: 1
SHORTNESS OF BREATH: 0
FREQUENCY: 0
FEVER: 0
CHILLS: 0
PALPITATIONS: 0

## 2022-04-27 ASSESSMENT — ACTIVITIES OF DAILY LIVING (ADL): CURRENT_FUNCTION: NO ASSISTANCE NEEDED

## 2022-04-27 ASSESSMENT — PAIN SCALES - GENERAL: PAINLEVEL: NO PAIN (0)

## 2022-04-27 NOTE — PROGRESS NOTES
{PROVIDER CHARTING PREFERENCE:558259}    Subjective   Carolee is a 81 year old who presents for the following health issues {ACCOMPANIED BY STATEMENT (Optional):568019}    HPI     Follow up     {additonal problems for provider to add (Optional):821700}    Review of Systems   Constitutional: Negative for chills and fever.   HENT: Negative for congestion, ear pain, hearing loss and sore throat.    Eyes: Negative for visual disturbance.   Respiratory: Positive for cough. Negative for shortness of breath.    Cardiovascular: Negative for chest pain, palpitations and peripheral edema.   Gastrointestinal: Negative for abdominal pain, constipation, diarrhea, heartburn, hematochezia and nausea.   Breasts:  Negative for tenderness, breast mass and discharge.   Genitourinary: Negative for dysuria, frequency, genital sores, hematuria, pelvic pain, urgency, vaginal bleeding and vaginal discharge.   Musculoskeletal: Negative for arthralgias, joint swelling and myalgias.   Skin: Positive for rash.   Neurological: Positive for dizziness. Negative for weakness, headaches and paresthesias.   Psychiatric/Behavioral: Negative for mood changes. The patient is not nervous/anxious.       {ROS COMP (Optional):092320}      Objective    There were no vitals taken for this visit.  There is no height or weight on file to calculate BMI.  Physical Exam   {Exam List (Optional):220229}    {Diagnostic Test Results (Optional):942555}    {AMBULATORY ATTESTATION (Optional):115530}

## 2022-04-27 NOTE — PROGRESS NOTES
Assessment & Plan     Benign essential hypertension  Elevated at home as well. Readings between 130-150 and at recheck in half an hour, readings normalize. She could benefit from another BP medication. discussed about ACEi or ARB.   - COMPREHENSIVE METABOLIC PANEL; Future    Dizziness  Stable. Continue meclizine as needed. Medication refilled.  - CBC with Platelets (Today); Future  - meclizine (ANTIVERT) 25 MG tablet; Take 1 tablet (25 mg) by mouth every 6 hours as needed for dizziness    Need for hepatitis C screening test  - Hepatitis C antibody; Future    Screening for hyperlipidemia  - Lipid Profile; Future    See Patient Instructions    Return in about 1 year (around 4/27/2023) for Follow up, Routine preventive, with any available provider.    LETY RAYO MD  [facility] SO Zarco is a 81 year old who presents for the following health issues     HPI     Chief Complaint   Patient presents with     RECHECK     Pt had labs drawn on 4/4, there were no orders placed so it appears the blood was discarded.  Pt has rash that will not go away and needs her bp med regulated.  But is very upset about the labs on 4/4     This was initially scheduled as physical exam. Patient had one in march this year and no blood work was ordered.  She came in for lab work later and orders were not signed, blood sample was discarded.  Today she is upset. She would like to get blood work done.   She is on diltiazem for HTN and BP at home is not well controlled.   She has hx of leg swelling with amlodipine.   She has a pharmacist who she consults with for medications.   I discussed about adding an ACEi or ARB, she will discuss with her pharmacist.     Review of Systems   Constitutional: Negative for chills and fever.   HENT: Negative for congestion, ear pain, hearing loss and sore throat.    Eyes: Negative for visual disturbance.   Respiratory: Positive for cough. Negative for shortness of breath.   "  Cardiovascular: Negative for chest pain, palpitations and peripheral edema.   Gastrointestinal: Negative for abdominal pain, constipation, diarrhea, heartburn, hematochezia and nausea.   Breasts:  Negative for tenderness, breast mass and discharge.   Genitourinary: Negative for dysuria, frequency, genital sores, hematuria, pelvic pain, urgency, vaginal bleeding and vaginal discharge.   Musculoskeletal: Negative for arthralgias, joint swelling and myalgias.   Skin: Positive for rash.   Neurological: Positive for dizziness. Negative for weakness, headaches and paresthesias.   Psychiatric/Behavioral: Negative for mood changes. The patient is not nervous/anxious.           Objective    BP (!) 141/78 (BP Location: Right arm, Cuff Size: Adult Regular)   Pulse 87   Temp 97.2  F (36.2  C) (Tympanic)   Resp 20   Ht 1.685 m (5' 6.33\")   Wt 74.3 kg (163 lb 12.8 oz)   SpO2 96%   BMI 26.18 kg/m    Body mass index is 26.18 kg/m .  Physical Exam         "

## 2022-04-27 NOTE — LETTER
"May 9, 2022      Carolee Doherty  [address]   [facility] [address]        Palak Zarco,      I reviewed your test results and they are:     - Normal CBC (white blood cells, hemoglobin and platelets)     - Normal electrolytes, kidney function and liver enzymes      - Normal HDL (\"good cholesterol\")     - Elevated LDL (\"bad cholesterol\") and total cholesterol. I recommend that you start taking a cholesterol pill call atorvastatin. Let me know if you agree and I can send this medication to your pharmacy.      - Negative Hepatitis C        Resulted Orders   CBC with Platelets (Today)   Result Value Ref Range    WBC Count 8.6 4.0 - 11.0 10e3/uL    RBC Count 5.20 3.80 - 5.20 10e6/uL    Hemoglobin 15.6 11.7 - 15.7 g/dL    Hematocrit 45.8 35.0 - 47.0 %    MCV 88 78 - 100 fL    MCH 30.0 26.5 - 33.0 pg    MCHC 34.1 31.5 - 36.5 g/dL    RDW 14.9 10.0 - 15.0 %    Platelet Count 271 150 - 450 10e3/uL   COMPREHENSIVE METABOLIC PANEL   Result Value Ref Range    Sodium 138 133 - 144 mmol/L    Potassium 4.7 3.4 - 5.3 mmol/L    Chloride 106 94 - 109 mmol/L    Carbon Dioxide (CO2) 26 20 - 32 mmol/L      Comment:      This result was previously suppressed from the chart.    Anion Gap 6 3 - 14 mmol/L      Comment:      This result was previously suppressed from the chart.    Urea Nitrogen 23 7 - 30 mg/dL      Comment:      This result was previously suppressed from the chart.    Creatinine 0.83 0.52 - 1.04 mg/dL      Comment:      This result was previously suppressed from the chart.    Calcium 10.0 8.5 - 10.1 mg/dL      Comment:      This result was previously suppressed from the chart.    Glucose 96 70 - 99 mg/dL      Comment:      This result was previously suppressed from the chart.    Alkaline Phosphatase 106 40 - 150 U/L      Comment:      This result was previously suppressed from the chart.    AST 23 0 - 45 U/L      Comment:      This result was previously suppressed from the chart.    ALT 27 0 - 50 U/L      Comment:      " This result was previously suppressed from the chart.    Protein Total 7.8 6.8 - 8.8 g/dL      Comment:      This result was previously suppressed from the chart.    Albumin 3.8 3.4 - 5.0 g/dL      Comment:      This result was previously suppressed from the chart.    Bilirubin Total 0.4 0.2 - 1.3 mg/dL      Comment:      This result was previously suppressed from the chart.    GFR Estimate 71 >60 mL/min/1.73m2      Comment:      Effective December 21, 2021 eGFRcr in adults is calculated using the 2021 CKD-EPI creatinine equation which includes age and gender (Martha et al., NEJM, DOI: 10.1056/QWIHzs8719991)   This result was previously suppressed from the chart.   Lipid Profile   Result Value Ref Range    Cholesterol 200 (H) <200 mg/dL    Triglycerides 100 <150 mg/dL    Direct Measure HDL 61 >=50 mg/dL    LDL Cholesterol Calculated 119 (H) <=100 mg/dL    Non HDL Cholesterol 139 (H) <130 mg/dL    Patient Fasting > 8hrs? No     Narrative    Cholesterol  Desirable:  <200 mg/dL    Triglycerides  Normal:  Less than 150 mg/dL  Borderline High:  150-199 mg/dL  High:  200-499 mg/dL  Very High:  Greater than or equal to 500 mg/dL    Direct Measure HDL  Female:  Greater than or equal to 50 mg/dL   Male:  Greater than or equal to 40 mg/dL    LDL Cholesterol  Desirable:  <100mg/dL  Above Desirable:  100-129 mg/dL   Borderline High:  130-159 mg/dL   High:  160-189 mg/dL   Very High:  >= 190 mg/dL    Non HDL Cholesterol  Desirable:  130 mg/dL  Above Desirable:  130-159 mg/dL  Borderline High:  160-189 mg/dL  High:  190-219 mg/dL  Very High:  Greater than or equal to 220 mg/dL   Hepatitis C antibody   Result Value Ref Range    Hepatitis C Antibody Nonreactive Nonreactive    Narrative    Assay performance characteristics have not been established for newborns, infants, and children.       If you have any questions or concerns, please call the clinic at the number listed above.       Sincerely,      Marcia Bryan MD        [facility]

## 2022-04-27 NOTE — PROGRESS NOTES
"SUBJECTIVE:   Carolee Doherty is a 81 year old female who presents for Preventive Visit.      Patient has been advised of split billing requirements and indicates understanding: Yes  Are you in the first 12 months of your Medicare coverage?  No    Healthy Habits:     In general, how would you rate your overall health?  Good    Frequency of exercise:  2-3 days/week    Duration of exercise:  15-30 minutes    Do you usually eat at least 4 servings of fruit and vegetables a day, include whole grains    & fiber and avoid regularly eating high fat or \"junk\" foods?  Yes    Taking medications regularly:  Yes    Medication side effects:  Lightheadedness    Ability to successfully perform activities of daily living:  No assistance needed    Home Safety:  No safety concerns identified    Hearing Impairment:  No hearing concerns    In the past 6 months, have you been bothered by leaking of urine?  No    In general, how would you rate your overall mental or emotional health?  Excellent      PHQ-2 Total Score: 0    Additional concerns today:  No    Do you feel safe in your environment? Yes    Have you ever done Advance Care Planning? (For example, a Health Directive, POLST, or a discussion with a medical provider or your loved ones about your wishes): Yes, patient states has an Advance Care Planning document and will bring a copy to the clinic.       Fall risk  Fallen 2 or more times in the past year?: No  Any fall with injury in the past year?: No    Cognitive Screening { :817504}    {Do you have sleep apnea, excessive snoring or daytime drowsiness? (Optional):330325}    Reviewed and updated as needed this visit by clinical staff                    Reviewed and updated as needed this visit by Provider                   Social History     Tobacco Use     Smoking status: Former Smoker     Packs/day: 0.10     Years: 3.00     Pack years: 0.30     Smokeless tobacco: Never Used   Substance Use Topics     Alcohol use: No     {Rooming " Staff- Complete this question if Prescreen response is not shown below for today's visit. If you drink alcohol do you typically have >3 drinks per day or >7 drinks per week? (Optional):511439}    Alcohol Use 3/3/2022   Prescreen: >3 drinks/day or >7 drinks/week? No   Prescreen: >3 drinks/day or >7 drinks/week? -   {add AUDIT responses (Optional) (A score of 7 for adult men is an indication of hazardous drinking; a score of 8 or more is an indication of an alcohol use disorder.  A score of 7 or more for adult women is an indication of hazardous drinking or an alchohol use disorder):330910}    {Outside tests to abstract? :733396}    {additional problems to add (Optional):941040}    Current providers sharing in care for this patient include: {Rooming staff:  Please update Care Team in Rooming Activity, refresh this note and then delete this statement}  Patient Care Team:  Kristian Gupta MD as PCP - General (Internal Medicine)  Kristian Gupta MD as Assigned PCP  Tricia Curtis MD as Assigned OBGYN Provider    The following health maintenance items are reviewed in Epic and correct as of today:  Health Maintenance Due   Topic Date Due     ANNUAL REVIEW OF  ORDERS  Never done     HEPATITIS C SCREENING  Never done     LUNG CANCER SCREENING  Never done     {Chronicprobdata (optional):342643}  {Decision Support (Optional):617147}    {Mammo DS 75+ :131973}  Pertinent mammograms are reviewed under the imaging tab.    Review of Systems   Constitutional: Negative for chills and fever.   HENT: Negative for congestion, ear pain, hearing loss and sore throat.    Eyes: Negative for visual disturbance.   Respiratory: Positive for cough. Negative for shortness of breath.    Cardiovascular: Negative for chest pain, palpitations and peripheral edema.   Gastrointestinal: Negative for abdominal pain, constipation, diarrhea, heartburn, hematochezia and nausea.   Breasts:  Negative for tenderness, breast mass and discharge.  "  Genitourinary: Negative for dysuria, frequency, genital sores, hematuria, pelvic pain, urgency, vaginal bleeding and vaginal discharge.   Musculoskeletal: Negative for arthralgias, joint swelling and myalgias.   Skin: Positive for rash.   Neurological: Positive for dizziness. Negative for weakness, headaches and paresthesias.   Psychiatric/Behavioral: Negative for mood changes. The patient is not nervous/anxious.      {ROS COMP (Optional):887573}    OBJECTIVE:   There were no vitals taken for this visit. Estimated body mass index is 25.53 kg/m  as calculated from the following:    Height as of 3/3/22: 1.702 m (5' 7\").    Weight as of 3/3/22: 73.9 kg (163 lb).  Physical Exam  {Exam (Optional) :789148}    {Diagnostic Test Results (Optional):336481::\"Diagnostic Test Results:\",\"Labs reviewed in Epic\"}    ASSESSMENT / PLAN:   {Diag Picklist:151433}    {Patient advised of split billing (Optional):975867}    COUNSELING:  {Medicare Counselin}    Estimated body mass index is 25.53 kg/m  as calculated from the following:    Height as of 3/3/22: 1.702 m (5' 7\").    Weight as of 3/3/22: 73.9 kg (163 lb).    {Weight Management Plan (ACO) Complete if BMI is abnormal-  Ages 18-64  BMI >24.9.  Age 65+ with BMI <23 or >30 (Optional):028367}    She reports that she has quit smoking. She has a 0.30 pack-year smoking history. She has never used smokeless tobacco.      Appropriate preventive services were discussed with this patient, including applicable screening as appropriate for cardiovascular disease, diabetes, osteopenia/osteoporosis, and glaucoma.  As appropriate for age/gender, discussed screening for colorectal cancer, prostate cancer, breast cancer, and cervical cancer. Checklist reviewing preventive services available has been given to the patient.    Reviewed patients plan of care and provided an AVS. The {CarePlan:974147} for Carolee meets the Care Plan requirement. This Care Plan has been established and reviewed " with the {PATIENT, FAMILY MEMBER, CAREGIVER:698790}.    Counseling Resources:  ATP IV Guidelines  Pooled Cohorts Equation Calculator  Breast Cancer Risk Calculator  Breast Cancer: Medication to Reduce Risk  FRAX Risk Assessment  ICSI Preventive Guidelines  Dietary Guidelines for Americans, 2010  USDA's MyPlate  ASA Prophylaxis  Lung CA Screening    LETY RAYO MD  [facility]    Identified Health Risks:

## 2022-04-28 LAB
ALBUMIN SERPL-MCNC: 3.8 G/DL (ref 3.4–5)
ALP SERPL-CCNC: 106 U/L (ref 40–150)
ALT SERPL W P-5'-P-CCNC: 27 U/L (ref 0–50)
ANION GAP SERPL CALCULATED.3IONS-SCNC: 6 MMOL/L (ref 3–14)
AST SERPL W P-5'-P-CCNC: 23 U/L (ref 0–45)
BILIRUB SERPL-MCNC: 0.4 MG/DL (ref 0.2–1.3)
BUN SERPL-MCNC: 23 MG/DL (ref 7–30)
CALCIUM SERPL-MCNC: 10 MG/DL (ref 8.5–10.1)
CHLORIDE BLD-SCNC: 106 MMOL/L (ref 94–109)
CHOLEST SERPL-MCNC: 200 MG/DL
CO2 SERPL-SCNC: 26 MMOL/L (ref 20–32)
CREAT SERPL-MCNC: 0.83 MG/DL (ref 0.52–1.04)
FASTING STATUS PATIENT QL REPORTED: NO
GFR SERPL CREATININE-BSD FRML MDRD: 71 ML/MIN/1.73M2
GLUCOSE BLD-MCNC: 96 MG/DL (ref 70–99)
HCV AB SERPL QL IA: NONREACTIVE
HDLC SERPL-MCNC: 61 MG/DL
LDLC SERPL CALC-MCNC: 119 MG/DL
NONHDLC SERPL-MCNC: 139 MG/DL
POTASSIUM BLD-SCNC: 4.7 MMOL/L (ref 3.4–5.3)
PROT SERPL-MCNC: 7.8 G/DL (ref 6.8–8.8)
SODIUM SERPL-SCNC: 138 MMOL/L (ref 133–144)
TRIGL SERPL-MCNC: 100 MG/DL

## 2022-10-04 ENCOUNTER — TRANSFERRED RECORDS (OUTPATIENT)
Dept: FAMILY MEDICINE | Facility: CLINIC | Age: 82
End: 2022-10-04

## 2022-10-15 ENCOUNTER — HEALTH MAINTENANCE LETTER (OUTPATIENT)
Age: 82
End: 2022-10-15

## 2023-03-30 DIAGNOSIS — I10 BENIGN ESSENTIAL HYPERTENSION: ICD-10-CM

## 2023-03-30 RX ORDER — DILTIAZEM HYDROCHLORIDE 180 MG/1
CAPSULE, COATED, EXTENDED RELEASE ORAL
Qty: 90 CAPSULE | Refills: 3 | Status: SHIPPED | OUTPATIENT
Start: 2023-03-30 | End: 2024-04-08

## 2023-05-26 ENCOUNTER — NURSE TRIAGE (OUTPATIENT)
Dept: FAMILY MEDICINE | Facility: CLINIC | Age: 83
End: 2023-05-26
Payer: COMMERCIAL

## 2023-05-26 NOTE — TELEPHONE ENCOUNTER
Patient reports waking up constantly every NOC since early April and states it's caused by Diltiazem. She is only able to sleep for an hour at a time. She has tried different manufactures and each has had different side effects: itching, sleeping difficulties, or nightmares. Patient requesting to try an alternative BP medication.   Patient reports TR suggested trying Losartan at 4/27/22 OV, patient is interested in trying this if PCP agrees.     Patient is currently asymptomatic.   Patient sometimes feels unbalanced/dizzy while walking. She is not sure if this is related to perforated L tympanic membrane.  She has also been under an increased amount of stress recently, and thinks this may also be contributing.    She doesn't log her BP readings, reports from memory her top number is usually between 125-130, unable to recall bottom number. Sometimes she will get a higher reading and will retake her BP within 1 minute, next BP reading is back to baseline. RN educated patient on importance of waiting at least 5 minutes between BP readings to prevent inaccurate results, patient verbalized understanding.    BP while on phone with Triage: (patient is asymptomatic)  12:10pm /88  P 81  12:15pm /92  P 79    Patient states she does not have elevated BP readings at home and attributes today's elevation to Triage phone call.     Dispo: See in office within 3 days  Pt declines, prefers to see if PCP agrees to change medication first.     Pharmacy pended      Can we leave a detailed message on this number? YES  Phone number patient can be reached at: Cell number on file:    Telephone Information:   Mobile [phone]       Mikaela Luna, RN  [facility] Triage      Reason for Disposition    Systolic BP >= 160 OR Diastolic >= 100    Additional Information    Negative: Sounds like a life-threatening emergency to the triager    Negative: Pregnant > 20 weeks or postpartum (< 6 weeks after delivery) and  new hand or face swelling    Negative: Pregnant > 20 weeks and BP > 140/90    Negative: Systolic BP >= 160 OR Diastolic >= 100, and any cardiac or neurologic symptoms (e.g., chest pain, difficulty breathing, unsteady gait, blurred vision)    Negative: Patient sounds very sick or weak to the triager    Negative: BP Systolic BP >= 140 OR Diastolic >= 90 and postpartum (from 0 to 6 weeks after delivery)    Negative: Systolic BP >= 180 OR Diastolic >= 110, and missed most recent dose of blood pressure medication    Negative: Systolic BP >= 180 OR Diastolic >= 110    Negative: Patient wants to be seen    Negative: Ran out of BP medications    Negative: Taking BP medications and feels is having side effects (e.g., impotence, cough, dizziness)    Protocols used: HIGH BLOOD PRESSURE-A-OH

## 2023-05-26 NOTE — TELEPHONE ENCOUNTER
I would say that we should schedule an office visit appointment as soon as an appointment is available to evaluate her new symptoms

## 2023-05-27 ENCOUNTER — HEALTH MAINTENANCE LETTER (OUTPATIENT)
Age: 83
End: 2023-05-27

## 2023-05-31 ENCOUNTER — OFFICE VISIT (OUTPATIENT)
Dept: FAMILY MEDICINE | Facility: CLINIC | Age: 83
End: 2023-05-31
Payer: COMMERCIAL

## 2023-05-31 VITALS
TEMPERATURE: 97.6 F | HEART RATE: 83 BPM | SYSTOLIC BLOOD PRESSURE: 138 MMHG | OXYGEN SATURATION: 94 % | WEIGHT: 164.2 LBS | BODY MASS INDEX: 26.24 KG/M2 | DIASTOLIC BLOOD PRESSURE: 70 MMHG | RESPIRATION RATE: 18 BRPM

## 2023-05-31 DIAGNOSIS — I10 BENIGN ESSENTIAL HYPERTENSION: ICD-10-CM

## 2023-05-31 DIAGNOSIS — G47.9 SLEEP DISTURBANCE: ICD-10-CM

## 2023-05-31 DIAGNOSIS — R42 VERTIGO: Primary | ICD-10-CM

## 2023-05-31 PROBLEM — Z11.59 NEED FOR HEPATITIS C SCREENING TEST: Status: RESOLVED | Noted: 2022-04-27 | Resolved: 2023-05-31

## 2023-05-31 PROBLEM — R60.0 LOCALIZED EDEMA: Status: RESOLVED | Noted: 2017-02-23 | Resolved: 2023-05-31

## 2023-05-31 PROBLEM — Z13.220 SCREENING FOR HYPERLIPIDEMIA: Status: RESOLVED | Noted: 2022-04-27 | Resolved: 2023-05-31

## 2023-05-31 PROBLEM — E78.5 HYPERLIPIDEMIA LDL GOAL <130: Status: RESOLVED | Noted: 2017-02-23 | Resolved: 2023-05-31

## 2023-05-31 PROCEDURE — 99214 OFFICE O/P EST MOD 30 MIN: CPT | Performed by: INTERNAL MEDICINE

## 2023-05-31 RX ORDER — MIRTAZAPINE 7.5 MG/1
7.5 TABLET, FILM COATED ORAL AT BEDTIME
Qty: 90 TABLET | Refills: 3 | Status: SHIPPED | OUTPATIENT
Start: 2023-05-31 | End: 2023-07-03

## 2023-05-31 ASSESSMENT — PAIN SCALES - GENERAL: PAINLEVEL: NO PAIN (0)

## 2023-05-31 NOTE — PROGRESS NOTES
Assessment & Plan     Vertigo  Duration of symptoms does not fit peripheral vertigo  I think we should obtain a brain image to exclude a central cause for vertigo   If that is negative, trial of PT to see if that can address symptoms  - MR Brain w/o & w Contrast; Future    Sleep disturbance  Trial of low dose remeron; discussed potential risks and side effects   - mirtazapine (REMERON) 7.5 MG tablet; Take 1 tablet (7.5 mg) by mouth At Bedtime    Benign essential hypertension  Well controlled, continue current diltiazem       Plan for telephone follow up in 3-4 weeks to assess remeron therapy and adjust kim if needed and to follow up on brain scan result and progress with PT    31 minutes spent by me on the date of the encounter doing chart review, history and exam, documentation and further activities per the note           Kristian Gupta MD  [facility] SO Zarco is a 82 year old, presenting for the following health issues:  Hypertension (Patient here for a blood pressure follow up.  Patient having complaints of dizziness x 2 month feeling balance.)         View : No data to display.              History of Present Illness       Reason for visit:  Follow blood pressure and dizziness.  Symptom onset:  More than a month  Symptoms include:  Feeling like being on the edge of something  Symptom intensity:  Mild  Symptom progression:  Improving  Had these symptoms before:  Yes  Has tried/received treatment for these symptoms:  No  What makes it worse:  No  What makes it better:  Getting off feet.    She eats 2-3 servings of fruits and vegetables daily.She consumes 0 sweetened beverage(s) daily.She exercises with enough effort to increase her heart rate 10 to 19 minutes per day.  She exercises with enough effort to increase her heart rate 3 or less days per week.   She is taking medications regularly.       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  Physical Exam   GENERAL: healthy, alert and no distress  HENT: ear canals and TM's normal, nose and mouth without ulcers or lesions  NEURO: Alert and oriented to person, place and time. Cranial nerves 2-12 appear grossly intact.   No pronator drift.  Cerebellar testing intact.  Romberg negative.  Passes 'get up and go' test.  Unsteady gait noted.  Hallpike maneuver does not reliably reproduce symptoms or nystagmus, but symptoms do seem to be triggered when she turns her head to the left.  No focal weakness noted   PSYCH: Anxious affect, well groomed, normal speech

## 2023-06-12 ENCOUNTER — HOSPITAL ENCOUNTER (OUTPATIENT)
Dept: MRI IMAGING | Facility: CLINIC | Age: 83
Discharge: HOME | End: 2023-06-12
Attending: INTERNAL MEDICINE | Admitting: INTERNAL MEDICINE
Payer: COMMERCIAL

## 2023-06-12 DIAGNOSIS — R42 VERTIGO: ICD-10-CM

## 2023-06-12 PROCEDURE — 255N000002 HC RX 255 OP 636: Performed by: INTERNAL MEDICINE

## 2023-06-12 PROCEDURE — A9585 GADOBUTROL INJECTION: HCPCS | Performed by: INTERNAL MEDICINE

## 2023-06-12 PROCEDURE — 70553 MRI BRAIN STEM W/O & W/DYE: CPT

## 2023-06-12 RX ORDER — GADOBUTROL 604.72 MG/ML
7 INJECTION INTRAVENOUS ONCE
Status: COMPLETED | OUTPATIENT
Start: 2023-06-12 | End: 2023-06-12

## 2023-06-12 RX ADMIN — GADOBUTROL 7 ML: 604.72 INJECTION INTRAVENOUS at 12:11

## 2023-06-12 NOTE — RESULT ENCOUNTER NOTE
The following letter pertains to your most recent diagnostic tests:    Good news! The brain MRI is normal for someone who has used their brain for 82 years.  Volume loss and white matter changes are expected findings on a brain MRI for someone your age.      There are nor abnormalities such as tumors, strokes or bleeding the brain to explain your vertigo or dizziness.      I think you should try some physical therapy to help your muscles of balance and I a have asked the physical therapy schedulers to reach out to you to help you make an appointment.      We can discuss your progress and these results in more detail when we talk early next month.        Sincerely,    Dr. Gupta

## 2023-06-15 ENCOUNTER — THERAPY VISIT (OUTPATIENT)
Dept: PHYSICAL THERAPY | Facility: CLINIC | Age: 83
End: 2023-06-15
Attending: INTERNAL MEDICINE
Payer: COMMERCIAL

## 2023-06-15 DIAGNOSIS — H81.10 BPPV (BENIGN PAROXYSMAL POSITIONAL VERTIGO), UNSPECIFIED LATERALITY: Primary | ICD-10-CM

## 2023-06-15 DIAGNOSIS — R42 VERTIGO: ICD-10-CM

## 2023-06-15 PROCEDURE — 97162 PT EVAL MOD COMPLEX 30 MIN: CPT | Mod: GP | Performed by: PHYSICAL THERAPIST

## 2023-06-15 NOTE — PROGRESS NOTES
PHYSICAL THERAPY EVALUATION  Type of Visit: Evaluation    See electronic medical record for Abuse and Falls Screening details.    Subjective      Presenting condition or subjective complaint:  Started a few months ago. Not spinning. Now more constant and not going away as much.  MRI of brain was negative.  Daughter  at the end of November but feels stress of this might have contributed. Burial in April and knows she was not dizzy then. Did bring a cane since ground was uneven.  Hearing has diminihed a little but no significant change in hearing.  L perforated ear drum.  No recent illness. Balance has gotten to the point where she doesn't leave apt much and has to hold onto her . At baseline normally can drive but not doing that now.   Date of onset: 23    Relevant medical history:   see chart  Dates & types of surgery:  see chart    Prior diagnostic imaging/testing results:     MRI as noted above  Prior therapy history for the same diagnosis, illness or injury:    none    Prior Level of Function   Transfers: Independent  Ambulation: Independent  ADL: Independent  IADL: Driving    Living Environment  Social support:     Type of home:   Apartment  Stairs to enter the home:         Ramp:     Stairs inside the home:         Help at home:    Equipment owned:   cane.  Has a fold up w/c    Employment:      Hobbies/Interests:   [employer], walks for exercise    Patient goals for therapy:      Pain assessment: Denies pain     Objective   Cognitive Status Examination    Level of Consciousness: alert    OBSERVATION:    RANGE OF MOTION: not tested.   STRENGTH: not tested but may benefit from being tested in the future.     BED MOBILITY: Min assist long sitting to supine and back up to long sitting    TRANSFERS: SBA to min assist    GAIT:   Gait Description: Requires CGA to min assist to walk from waiting room to treatment room.  Slow, decreased B step length and reaching for support.     20 ft timed walk: 8.57  sec.    BALANCE:     SPECIAL TESTS  Functional Gait Assessment (FGA) TOTAL SCORE: (MAXIMUM SCORE 30): 15  (<22/30 indicates fall risk)     Dynamic Gait Index (DGI)    (<19/24 indicates fall risk)   Modified CTSIB Conditions (sec) Cond 1: 30  Cond 2: 30  Cond 4: 30  Cond 5 : unable         SENSATION: not tested      VESTIBULAR  Cervicogenic Screen    Neck ROM WFL for vestibular testing     Oculomotor Screen    Ocular ROM    Smooth Pursuit Normal   Saccades Normal   VOR Normal   VOR Cancellation Normal   Head Impulse Test Abnormal to Right   Convergence Testing         Infrared Goggle Exam Vestibular Suppressant in Last 24 Hours? No  Exam Completed With: Infrared goggles   Spontaneous Nystagmus Negative   Gaze Evoked Nystagmus Negative   Head Shake Horizontal Nystagmus Negative   Supine Head-Hanging Test     Left Right   Martina-Hallpike upbeating with ? L torsion Upbeating R torsional lasted > 1 min   [facility] Supine Roll Test Negative Negative           BPPV Canal(s): R Posterior  BPPV Type: Cupulolithasis   Also possibe L side involvement.     DHI:  22    Assessment & Plan   CLINICAL IMPRESSIONS   Medical Diagnosis: Vertigo    Treatment Diagnosis: BPPV, Balance problems   Impression/Assessment: Patient is a 82 year old female with dizziness and imbalance complaints.  The following significant findings have been identified: Decreased strength, Impaired gait, Decreased activity tolerance, Instability, Dizziness and Disequilibrium . These impairments interfere with their ability to perform self care tasks, recreational activities, household chores, driving , household mobility and community mobility as compared to previous level of function.     Clinical Decision Making (Complexity):   Clinical Presentation: Evolving/Changing  Clinical Presentation Rationale: based on medical and personal factors listed in PT evaluation  Clinical Decision Making (Complexity): Moderate complexity    PLAN OF CARE  Treatment  Interventions:  Interventions: Gait Training, Neuromuscular Re-education, Therapeutic Activity, Therapeutic Exercise, Canalith repositioning maneuvers    Long Term Goals     PT Goal 1  Goal Identifier: FGA  Goal Description: Client will demonstrate improved dynamic standing balance on the FGA scoring at least a 25/30 so that she is not at increased risk for falls.  Goal Progress: baseline: 15/30  Target Date: 08/13/23  PT Goal 2  Goal Identifier: BPPV  Goal Description: Client will demonstrate negative positional testing for all canals indicating that BPPV has resolved.  Target Date: 08/13/23  PT Goal 3  Goal Identifier: DHI  Goal Description: Client will subjectively report improved symptoms scoring a 10 or less on the DHI  Goal Progress: baseline: 22  Target Date: 08/13/23  PT Goal 4  Goal Identifier: gait  Goal Description: Client will be able to independently ambulate in the houseold and community distances of at least 300 ft so that she is at PLOF  Goal Progress: requires CGA to min assist with gait  Target Date: 08/13/23      Frequency of Treatment: 1x/week  Duration of Treatment: 60 days    Recommended Referrals to Other Professionals:  Education Assessment:        Risks and benefits of evaluation/treatment have been explained.   Patient/Family/caregiver agrees with Plan of Care.     Evaluation Time:     PT Eval, Moderate Complexity Minutes (94991): 45      Signing Clinician: Alexandra Arboleda, PT      [facility]                                                                                   OUTPATIENT PHYSICAL THERAPY      PLAN OF TREATMENT FOR OUTPATIENT REHABILITATION   Patient's Last Name, First Name, Carolee John YOB: 1940   Provider's Name   [facility]   Medical Record No.  [id number]     Onset Date: 06/12/23  Start of Care Date: 06/15/23     Medical Diagnosis:  Vertigo      PT Treatment Diagnosis:  BPPV, Balance  problems Plan of Treatment  Frequency/Duration: 1x/week/ 60 days    Certification date from 06/15/23 to 08/13/23         See note for plan of treatment details and functional goals     Alexandra Arboleda, PT                         I CERTIFY THE NEED FOR THESE SERVICES FURNISHED UNDER        THIS PLAN OF TREATMENT AND WHILE UNDER MY CARE     (Physician attestation of this document indicates review and certification of the therapy plan).                  Referring Provider:  Kristian Gupta      Initial Assessment  See Epic Evaluation- Start of Care Date: 06/15/23

## 2023-06-16 ENCOUNTER — TELEPHONE (OUTPATIENT)
Dept: FAMILY MEDICINE | Facility: CLINIC | Age: 83
End: 2023-06-16
Payer: COMMERCIAL

## 2023-06-16 DIAGNOSIS — H81.13 BENIGN PAROXYSMAL POSITIONAL VERTIGO DUE TO BILATERAL VESTIBULAR DISORDER: Primary | ICD-10-CM

## 2023-06-16 NOTE — TELEPHONE ENCOUNTER
----- Message from Alexandra Montes De Oca, PT sent at 6/15/2023  8:25 PM CDT -----  Regarding: BPPV order  Dr. Gupta,  I believe Carolee may have some  BPPV going which is why she demonstrates impaired balance. Medicare is very specific that we need a BPPV order in order to treat her. Since her original dx says Vertigo would you mind entering in another order with the dx of BPPV so we OK on a reimbursement end for treatment?  Thank you!  Alexandra Montes De Oca PT

## 2023-06-16 NOTE — PROGRESS NOTES
06/15/23 1000   Signing Clinician's Name / Credentials   Signing clinician's name / credentials Alexandra Montes De Oca MPT   Functional Gait Assessment (NATHALIE Christina., Gogo GKaryn F., et al. (2004))   1. GAIT LEVEL SURFACE 1   2. CHANGE IN GAIT SPEED 3   3. GAIT WITH HORIZONTAL HEAD TURNS 2   4. GAIT WITH VERTICAL HEAD TURNS 2   5. GAIT AND PIVOT TURN 1   6. STEP OVER OBSTACLE 1   7. GAIT WITH NARROW BASE OF SUPPORT 0   8. GAIT WITH EYES CLOSED 1   9. AMBULATING BACKWARDS 2   10. STEPS 2   Total Functional Gait Assessment Score   TOTAL SCORE: (MAXIMUM SCORE 30) 15

## 2023-06-21 ENCOUNTER — THERAPY VISIT (OUTPATIENT)
Dept: PHYSICAL THERAPY | Facility: CLINIC | Age: 83
End: 2023-06-21
Payer: COMMERCIAL

## 2023-06-21 DIAGNOSIS — H81.13 BENIGN PAROXYSMAL POSITIONAL VERTIGO DUE TO BILATERAL VESTIBULAR DISORDER: ICD-10-CM

## 2023-06-21 DIAGNOSIS — R42 VERTIGO: Primary | ICD-10-CM

## 2023-06-21 PROCEDURE — 95992 CANALITH REPOSITIONING PROC: CPT | Mod: GP

## 2023-06-26 ENCOUNTER — THERAPY VISIT (OUTPATIENT)
Dept: PHYSICAL THERAPY | Facility: CLINIC | Age: 83
End: 2023-06-26
Payer: COMMERCIAL

## 2023-06-26 DIAGNOSIS — R42 VERTIGO: ICD-10-CM

## 2023-06-26 DIAGNOSIS — H81.13 BENIGN PAROXYSMAL POSITIONAL VERTIGO DUE TO BILATERAL VESTIBULAR DISORDER: Primary | ICD-10-CM

## 2023-06-26 PROCEDURE — 95992 CANALITH REPOSITIONING PROC: CPT | Mod: GP | Performed by: PHYSICAL THERAPIST

## 2023-06-28 ENCOUNTER — THERAPY VISIT (OUTPATIENT)
Dept: PHYSICAL THERAPY | Facility: CLINIC | Age: 83
End: 2023-06-28
Payer: COMMERCIAL

## 2023-06-28 DIAGNOSIS — R42 VERTIGO: ICD-10-CM

## 2023-06-28 DIAGNOSIS — H81.13 BENIGN PAROXYSMAL POSITIONAL VERTIGO DUE TO BILATERAL VESTIBULAR DISORDER: Primary | ICD-10-CM

## 2023-06-28 PROCEDURE — 95992 CANALITH REPOSITIONING PROC: CPT | Mod: GP | Performed by: PHYSICAL THERAPIST

## 2023-07-03 ENCOUNTER — VIRTUAL VISIT (OUTPATIENT)
Dept: FAMILY MEDICINE | Facility: CLINIC | Age: 83
End: 2023-07-03
Payer: COMMERCIAL

## 2023-07-03 ENCOUNTER — THERAPY VISIT (OUTPATIENT)
Dept: PHYSICAL THERAPY | Facility: CLINIC | Age: 83
End: 2023-07-03
Payer: COMMERCIAL

## 2023-07-03 DIAGNOSIS — H81.13 BENIGN PAROXYSMAL POSITIONAL VERTIGO DUE TO BILATERAL VESTIBULAR DISORDER: Primary | ICD-10-CM

## 2023-07-03 DIAGNOSIS — R42 DIZZINESS: ICD-10-CM

## 2023-07-03 DIAGNOSIS — H81.13 BENIGN PAROXYSMAL POSITIONAL VERTIGO, BILATERAL: Primary | ICD-10-CM

## 2023-07-03 PROCEDURE — 99212 OFFICE O/P EST SF 10 MIN: CPT | Mod: 93 | Performed by: INTERNAL MEDICINE

## 2023-07-03 PROCEDURE — 95992 CANALITH REPOSITIONING PROC: CPT | Mod: GP | Performed by: PHYSICAL THERAPIST

## 2023-07-03 NOTE — PROGRESS NOTES
Carolee is a 82 year old who is being evaluated via a billable telephone visit.      What phone number would you like to be contacted at? [phone]  How would you like to obtain your AVS? Leann  Distant Location (provider location):  On-site    Assessment & Plan     Benign paroxysmal positional vertigo due to bilateral vestibular disorder  Continue work with vestibular rehab, if no resolution of symptoms by the end of the summer, she is to contact me to discuss next steps which might include an ENT or neurology consult.  Call sooner if symptoms worsen despite PT.  Plan for scheduling a preventive exam in September.        15 minutes spent by me on the date of the encounter doing chart review, history and exam, documentation and further activities per the note           Kristian Gupta MD  [facility]   Carolee is a 82 year old, presenting for the following health issues:  Follow Up         No data to display              HPI       She has seen some improvement with vestibular rehab after canalith repositioning maneuvers  MRI was negative for specific cause for dizziness  She decided not to start the mirtazapine as she does have some interruptions in her sleep, but does not feel sleepy throughout the day and does not feel that her symptoms warrant a new drug  She is optimistic that with additional canalith repositioning maneuvers, her vertigo will resolve completely      Review of Systems         Objective           Vitals:  No vitals were obtained today due to virtual visit.    Physical Exam   healthy, alert and no distress  PSYCH: Alert and oriented times 3; coherent speech, normal   rate and volume, able to articulate logical thoughts, able   to abstract reason, no tangential thoughts, no hallucinations   or delusions  Her affect is normal  RESP: No cough, no audible wheezing, able to talk in full sentences  Remainder of exam unable to be completed due to telephone visits                 Phone call duration: 12:46 minutes

## 2023-07-10 ENCOUNTER — THERAPY VISIT (OUTPATIENT)
Dept: PHYSICAL THERAPY | Facility: CLINIC | Age: 83
End: 2023-07-10
Payer: COMMERCIAL

## 2023-07-10 DIAGNOSIS — H81.13 BENIGN PAROXYSMAL POSITIONAL VERTIGO, BILATERAL: Primary | ICD-10-CM

## 2023-07-10 DIAGNOSIS — R42 DIZZINESS: ICD-10-CM

## 2023-07-10 PROCEDURE — 95992 CANALITH REPOSITIONING PROC: CPT | Mod: GP | Performed by: PHYSICAL THERAPIST

## 2023-07-17 ENCOUNTER — THERAPY VISIT (OUTPATIENT)
Dept: PHYSICAL THERAPY | Facility: CLINIC | Age: 83
End: 2023-07-17
Payer: COMMERCIAL

## 2023-07-17 ENCOUNTER — TELEPHONE (OUTPATIENT)
Dept: FAMILY MEDICINE | Facility: CLINIC | Age: 83
End: 2023-07-17

## 2023-07-17 DIAGNOSIS — R42 DIZZINESS: ICD-10-CM

## 2023-07-17 DIAGNOSIS — H81.13 BENIGN PAROXYSMAL POSITIONAL VERTIGO, BILATERAL: Primary | ICD-10-CM

## 2023-07-17 DIAGNOSIS — H81.13 BENIGN PAROXYSMAL POSITIONAL VERTIGO DUE TO BILATERAL VESTIBULAR DISORDER: Primary | ICD-10-CM

## 2023-07-17 PROCEDURE — 95992 CANALITH REPOSITIONING PROC: CPT | Mod: GP | Performed by: PHYSICAL THERAPIST

## 2023-07-17 NOTE — TELEPHONE ENCOUNTER
----- Message from Alexandra Montes De Oca, PT sent at 7/17/2023  1:13 PM CDT -----  Regarding: VNG/ENG order  Dr. Gupta  Carolee appears to have a very stubborn BPPV that is not responding well to treatment. I would recommend some next step testing that she may need to pursue if this is not getting better. We have already seen her for 7 sessions of vestibular PT and it is very stuck. Would you please write a referral to audiology for a VNG/ENG so that she can follow up for further assessment?    Thanks!  Alexandra Montes De Oca PT

## 2023-07-24 ENCOUNTER — THERAPY VISIT (OUTPATIENT)
Dept: PHYSICAL THERAPY | Facility: CLINIC | Age: 83
End: 2023-07-24
Payer: COMMERCIAL

## 2023-07-24 DIAGNOSIS — R26.89 BALANCE PROBLEMS: ICD-10-CM

## 2023-07-24 DIAGNOSIS — H81.13 BENIGN PAROXYSMAL POSITIONAL VERTIGO, BILATERAL: Primary | ICD-10-CM

## 2023-07-24 PROCEDURE — 95992 CANALITH REPOSITIONING PROC: CPT | Mod: GP | Performed by: PHYSICAL THERAPIST

## 2023-07-24 PROCEDURE — 97112 NEUROMUSCULAR REEDUCATION: CPT | Mod: GP | Performed by: PHYSICAL THERAPIST

## 2023-07-27 ENCOUNTER — APPOINTMENT (OUTPATIENT)
Dept: CARDIOLOGY | Facility: CLINIC | Age: 83
End: 2023-07-27
Attending: EMERGENCY MEDICINE
Payer: COMMERCIAL

## 2023-07-27 ENCOUNTER — HOSPITAL ENCOUNTER (EMERGENCY)
Facility: CLINIC | Age: 83
Discharge: HOME | End: 2023-07-27
Attending: EMERGENCY MEDICINE | Admitting: EMERGENCY MEDICINE
Payer: COMMERCIAL

## 2023-07-27 VITALS
RESPIRATION RATE: 16 BRPM | HEIGHT: 66 IN | SYSTOLIC BLOOD PRESSURE: 144 MMHG | WEIGHT: 160 LBS | BODY MASS INDEX: 25.71 KG/M2 | OXYGEN SATURATION: 91 % | TEMPERATURE: 97.5 F | HEART RATE: 80 BPM | DIASTOLIC BLOOD PRESSURE: 59 MMHG

## 2023-07-27 DIAGNOSIS — R00.2 PALPITATIONS: ICD-10-CM

## 2023-07-27 DIAGNOSIS — R82.90 ABNORMAL URINALYSIS: ICD-10-CM

## 2023-07-27 DIAGNOSIS — R42 DIZZINESS: ICD-10-CM

## 2023-07-27 LAB
ALBUMIN SERPL BCG-MCNC: 4.2 G/DL (ref 3.5–5.2)
ALBUMIN UR-MCNC: NEGATIVE MG/DL
ALP SERPL-CCNC: 91 U/L (ref 35–104)
ALT SERPL W P-5'-P-CCNC: 18 U/L (ref 0–50)
ANION GAP SERPL CALCULATED.3IONS-SCNC: 13 MMOL/L (ref 7–15)
APPEARANCE UR: CLEAR
AST SERPL W P-5'-P-CCNC: 24 U/L (ref 0–45)
ATRIAL RATE - MUSE: 75 BPM
BACTERIA #/AREA URNS HPF: ABNORMAL /HPF
BASOPHILS # BLD AUTO: 0.1 10E3/UL (ref 0–0.2)
BASOPHILS NFR BLD AUTO: 1 %
BILIRUB SERPL-MCNC: 0.6 MG/DL
BILIRUB UR QL STRIP: NEGATIVE
BUN SERPL-MCNC: 18.7 MG/DL (ref 8–23)
CALCIUM SERPL-MCNC: 9.9 MG/DL (ref 8.8–10.2)
CHLORIDE SERPL-SCNC: 106 MMOL/L (ref 98–107)
COLOR UR AUTO: ABNORMAL
CREAT SERPL-MCNC: 0.66 MG/DL (ref 0.51–0.95)
DEPRECATED HCO3 PLAS-SCNC: 22 MMOL/L (ref 22–29)
DIASTOLIC BLOOD PRESSURE - MUSE: NORMAL MMHG
EOSINOPHIL # BLD AUTO: 0.3 10E3/UL (ref 0–0.7)
EOSINOPHIL NFR BLD AUTO: 4 %
ERYTHROCYTE [DISTWIDTH] IN BLOOD BY AUTOMATED COUNT: 13.9 % (ref 10–15)
GFR SERPL CREATININE-BSD FRML MDRD: 88 ML/MIN/1.73M2
GLUCOSE SERPL-MCNC: 106 MG/DL (ref 70–99)
GLUCOSE UR STRIP-MCNC: NEGATIVE MG/DL
HCT VFR BLD AUTO: 47.1 % (ref 35–47)
HGB BLD-MCNC: 15.6 G/DL (ref 11.7–15.7)
HGB UR QL STRIP: NEGATIVE
IMM GRANULOCYTES # BLD: 0 10E3/UL
IMM GRANULOCYTES NFR BLD: 0 %
INTERPRETATION ECG - MUSE: NORMAL
KETONES UR STRIP-MCNC: NEGATIVE MG/DL
LEUKOCYTE ESTERASE UR QL STRIP: ABNORMAL
LYMPHOCYTES # BLD AUTO: 1.9 10E3/UL (ref 0.8–5.3)
LYMPHOCYTES NFR BLD AUTO: 30 %
MAGNESIUM SERPL-MCNC: 2.2 MG/DL (ref 1.7–2.3)
MCH RBC QN AUTO: 29.5 PG (ref 26.5–33)
MCHC RBC AUTO-ENTMCNC: 33.1 G/DL (ref 31.5–36.5)
MCV RBC AUTO: 89 FL (ref 78–100)
MONOCYTES # BLD AUTO: 0.6 10E3/UL (ref 0–1.3)
MONOCYTES NFR BLD AUTO: 9 %
MUCOUS THREADS #/AREA URNS LPF: PRESENT /LPF
NEUTROPHILS # BLD AUTO: 3.5 10E3/UL (ref 1.6–8.3)
NEUTROPHILS NFR BLD AUTO: 56 %
NITRATE UR QL: NEGATIVE
NRBC # BLD AUTO: 0 10E3/UL
NRBC BLD AUTO-RTO: 0 /100
P AXIS - MUSE: 72 DEGREES
PH UR STRIP: 6.5 [PH] (ref 5–7)
PLATELET # BLD AUTO: 295 10E3/UL (ref 150–450)
POTASSIUM SERPL-SCNC: 3.9 MMOL/L (ref 3.4–5.3)
PR INTERVAL - MUSE: 194 MS
PROT SERPL-MCNC: 7.8 G/DL (ref 6.4–8.3)
QRS DURATION - MUSE: 84 MS
QT - MUSE: 400 MS
QTC - MUSE: 446 MS
R AXIS - MUSE: -11 DEGREES
RBC # BLD AUTO: 5.28 10E6/UL (ref 3.8–5.2)
RBC URINE: 1 /HPF
SODIUM SERPL-SCNC: 141 MMOL/L (ref 136–145)
SP GR UR STRIP: 1.01 (ref 1–1.03)
SQUAMOUS EPITHELIAL: 3 /HPF
SYSTOLIC BLOOD PRESSURE - MUSE: NORMAL MMHG
T AXIS - MUSE: 46 DEGREES
TROPONIN T SERPL HS-MCNC: 9 NG/L
TSH SERPL DL<=0.005 MIU/L-ACNC: 1.84 UIU/ML (ref 0.3–4.2)
UROBILINOGEN UR STRIP-MCNC: NORMAL MG/DL
VENTRICULAR RATE- MUSE: 75 BPM
WBC # BLD AUTO: 6.4 10E3/UL (ref 4–11)
WBC URINE: 6 /HPF

## 2023-07-27 PROCEDURE — 84443 ASSAY THYROID STIM HORMONE: CPT | Performed by: EMERGENCY MEDICINE

## 2023-07-27 PROCEDURE — 258N000003 HC RX IP 258 OP 636: Performed by: EMERGENCY MEDICINE

## 2023-07-27 PROCEDURE — 83735 ASSAY OF MAGNESIUM: CPT | Performed by: EMERGENCY MEDICINE

## 2023-07-27 PROCEDURE — 36415 COLL VENOUS BLD VENIPUNCTURE: CPT | Performed by: EMERGENCY MEDICINE

## 2023-07-27 PROCEDURE — 84484 ASSAY OF TROPONIN QUANT: CPT | Performed by: EMERGENCY MEDICINE

## 2023-07-27 PROCEDURE — 80053 COMPREHEN METABOLIC PANEL: CPT | Performed by: EMERGENCY MEDICINE

## 2023-07-27 PROCEDURE — 93005 ELECTROCARDIOGRAM TRACING: CPT | Mod: XU

## 2023-07-27 PROCEDURE — 87186 SC STD MICRODIL/AGAR DIL: CPT | Performed by: EMERGENCY MEDICINE

## 2023-07-27 PROCEDURE — 99285 EMERGENCY DEPT VISIT HI MDM: CPT | Mod: 25

## 2023-07-27 PROCEDURE — 85025 COMPLETE CBC W/AUTO DIFF WBC: CPT | Performed by: EMERGENCY MEDICINE

## 2023-07-27 PROCEDURE — 93242 EXT ECG>48HR<7D RECORDING: CPT

## 2023-07-27 PROCEDURE — 93248 EXT ECG>7D<15D REV&INTERPJ: CPT | Performed by: INTERNAL MEDICINE

## 2023-07-27 PROCEDURE — 81001 URINALYSIS AUTO W/SCOPE: CPT | Performed by: EMERGENCY MEDICINE

## 2023-07-27 PROCEDURE — 96360 HYDRATION IV INFUSION INIT: CPT | Mod: 59

## 2023-07-27 PROCEDURE — 250N000013 HC RX MED GY IP 250 OP 250 PS 637: Performed by: EMERGENCY MEDICINE

## 2023-07-27 RX ORDER — MECLIZINE HYDROCHLORIDE 25 MG/1
25 TABLET ORAL 3 TIMES DAILY PRN
Qty: 30 TABLET | Refills: 0 | Status: SHIPPED | OUTPATIENT
Start: 2023-07-27 | End: 2024-02-15

## 2023-07-27 RX ORDER — MECLIZINE HYDROCHLORIDE 25 MG/1
25 TABLET ORAL ONCE
Status: COMPLETED | OUTPATIENT
Start: 2023-07-27 | End: 2023-07-27

## 2023-07-27 RX ORDER — CEPHALEXIN 500 MG/1
500 CAPSULE ORAL 2 TIMES DAILY
Qty: 14 CAPSULE | Refills: 0 | Status: SHIPPED | OUTPATIENT
Start: 2023-07-27 | End: 2023-08-03

## 2023-07-27 RX ADMIN — MECLIZINE HYDROCHLORIDE 25 MG: 25 TABLET ORAL at 08:23

## 2023-07-27 RX ADMIN — SODIUM CHLORIDE 1000 ML: 9 INJECTION, SOLUTION INTRAVENOUS at 08:22

## 2023-07-27 ASSESSMENT — ACTIVITIES OF DAILY LIVING (ADL): ADLS_ACUITY_SCORE: 35

## 2023-07-27 NOTE — ED TRIAGE NOTES
"Patient presents to ED with multiple complaits. States yesterday from 2pm-6pm her HR was 130s-140s, but it resolved on her own, and it happened again this morning. Reports she has been getting treatment for inner ear crystals and wondering if this is related. Also reporting some recent mobility issues, problems controlling BP, and \"disconnected from world.\" Thoughts scattered in triage. Denies urinary symptoms.       "

## 2023-07-27 NOTE — ED PROVIDER NOTES
History     Chief Complaint:  Palpitations       HPI   Carolee Doehrty is a 82 year old female who presents to the ER for evaluation of episode of palpitations that lasted about 4 hours yesterday afternoon as well as feeling dizzy this morning.  Patient reports 2-month history of vertigo with negative MRI of the brain and lack of significant response to this tubular therapy.  During episode yesterday of palpitations, she noticed some neck tightness and checked her pulse which was up to 140 bpm.  No chest pain or shortness of breath.  She denies known history of atrial fibrillation.  Currently no double vision or missing vision or numbness or weakness in extremities.  She thinks she might have some paresthesias in the right forearm but is not sure.        Medications:    cephALEXin (KEFLEX) 500 MG capsule  meclizine (ANTIVERT) 25 MG tablet  diltiazem ER COATED BEADS (CARTIA XT) 180 MG 24 hr capsule  meclizine (ANTIVERT) 25 MG tablet        Past Medical History:    Past Medical History:   Diagnosis Date    Bronchitis     Edema     Hyperlipidemia     Hyperlipidemia LDL goal <130 2/23/2017    Other and unspecified hyperlipidemia     Undiagnosed cardiac murmurs     Unspecified essential hypertension        Past Surgical History:    Past Surgical History:   Procedure Laterality Date    COLONOSCOPY      COLONOSCOPY N/A 4/4/2017    Procedure: COLONOSCOPY;  Surgeon: Yaakov Middleton MD;  Location:  GI    PHACOEMULSIFICATION CLEAR CORNEA WITH STANDARD INTRAOCULAR LENS IMPLANT  9/5/2012    Procedure: PHACOEMULSIFICATION CLEAR CORNEA WITH STANDARD INTRAOCULAR LENS IMPLANT;  RIGHT PHACOEMULSIFCATION CLEAR CORNEA WITH STANDARD INTRAOCULAR LENS IMPLANT ;  Surgeon: Beto Gaston MD;  Location:  EC    PHACOEMULSIFICATION CLEAR CORNEA WITH STANDARD INTRAOCULAR LENS IMPLANT  10/31/2012    Procedure: PHACOEMULSIFICATION CLEAR CORNEA WITH STANDARD INTRAOCULAR LENS IMPLANT;  LEFT PHACOEMULSIFICATION CLEAR CORNEA WITH STANDARD  "INTRAOCULAR LENS IMPLANT ;  Surgeon: Beto Gaston MD;  Location: [facility]        Physical Exam   Patient Vitals for the past 24 hrs:   BP Temp Temp src Pulse Resp SpO2 Height Weight   07/27/23 0958 -- -- -- -- -- 91 % -- --   07/27/23 0943 -- -- -- -- -- 92 % -- --   07/27/23 0928 -- -- -- -- -- 93 % -- --   07/27/23 0906 -- -- -- -- -- 94 % -- --   07/27/23 0858 -- -- -- -- -- 95 % -- --   07/27/23 0851 -- -- -- -- -- 91 % -- --   07/27/23 0843 -- -- -- -- -- 91 % -- --   07/27/23 0836 -- -- -- -- -- 91 % -- --   07/27/23 0821 -- -- -- -- -- 95 % -- --   07/27/23 0737 (!) 144/59 97.5  F (36.4  C) Oral 80 16 95 % 1.676 m (5' 6\") 72.6 kg (160 lb)        Physical Exam  VS: Reviewed per above  HENT: Mucous membranes moist, no nuchal rigidity  EYES: sclera anicteric  CV: Rate as noted, regular rhythm.   RESP: Effort normal. Breath sounds are normal bilaterally.  GI: no tenderness/rebound/guarding, not distended.  NEURO: GCS 15, cranial nerves II through XII are intact, 5 out of 5 strength in all 4 extremities, sensation is intact light touch in all 4 extremities  MSK: No deformity of the extremities  SKIN: Warm and dry    Emergency Department Course   ECG  ECG results from 07/27/23   EKG 12 lead     Value    Systolic Blood Pressure     Diastolic Blood Pressure     Ventricular Rate 75    Atrial Rate 75    ND Interval 194    QRS Duration 84        QTc 446    P Axis 72    R AXIS -11    T Axis 46    Interpretation ECG      Sinus rhythm  Possible Left atrial enlargement  Minimal voltage criteria for LVH, may be normal variant ( R in aVL )  Borderline ECG  No previous ECGs available  Confirmed by GENERATED REPORT, COMPUTER ([id number]),  VeSaba rodriguez ([id number]) on 7/27/2023 8:03:41 AM       Laboratory:  Labs Ordered and Resulted from Time of ED Arrival to Time of ED Departure   COMPREHENSIVE METABOLIC PANEL - Abnormal       Result Value    Sodium 141      Potassium 3.9      Chloride 106      Carbon Dioxide (CO2) 22      " Anion Gap 13      Urea Nitrogen 18.7      Creatinine 0.66      Calcium 9.9      Glucose 106 (*)     Alkaline Phosphatase 91      AST 24      ALT 18      Protein Total 7.8      Albumin 4.2      Bilirubin Total 0.6      GFR Estimate 88     ROUTINE UA WITH MICROSCOPIC REFLEX TO CULTURE - Abnormal    Color Urine Light Yellow      Appearance Urine Clear      Glucose Urine Negative      Bilirubin Urine Negative      Ketones Urine Negative      Specific Gravity Urine 1.009      Blood Urine Negative      pH Urine 6.5      Protein Albumin Urine Negative      Urobilinogen Urine Normal      Nitrite Urine Negative      Leukocyte Esterase Urine Moderate (*)     Bacteria Urine Few (*)     Mucus Urine Present (*)     RBC Urine 1      WBC Urine 6 (*)     Squamous Epithelials Urine 3 (*)    CBC WITH PLATELETS AND DIFFERENTIAL - Abnormal    WBC Count 6.4      RBC Count 5.28 (*)     Hemoglobin 15.6      Hematocrit 47.1 (*)     MCV 89      MCH 29.5      MCHC 33.1      RDW 13.9      Platelet Count 295      % Neutrophils 56      % Lymphocytes 30      % Monocytes 9      % Eosinophils 4      % Basophils 1      % Immature Granulocytes 0      NRBCs per 100 WBC 0      Absolute Neutrophils 3.5      Absolute Lymphocytes 1.9      Absolute Monocytes 0.6      Absolute Eosinophils 0.3      Absolute Basophils 0.1      Absolute Immature Granulocytes 0.0      Absolute NRBCs 0.0     TROPONIN T, HIGH SENSITIVITY - Normal    Troponin T, High Sensitivity 9     TSH WITH FREE T4 REFLEX - Normal    TSH 1.84     MAGNESIUM - Normal    Magnesium 2.2     URINE CULTURE        Emergency Department Course & Assessments:             Interventions:  Medications   meclizine (ANTIVERT) tablet 25 mg (25 mg Oral $Given 7/27/23 3864)   0.9% sodium chloride BOLUS (0 mLs Intravenous Stopped 7/27/23 0974)        Disposition:  The patient was discharged to home.     Impression & Plan        Medical Decision Making:  Patient presents to the ER for evaluation of palpitations  yesterday evening as well as dizziness this morning.  Vital signs reassuring.  No focal neurological deficits.  Patient has been suffering from vertigo over the past few months and had reassuring MRI brain at onset.  Offered repeat MRI today but patient declined.  With respect to palpitations, ECG sinus rhythm.  No concerning electrolyte derangement or thyroid function abnormality or anemia or acute kidney injury or other laboratory findings that might explain her palpitations.  Low clinical suspicion for PE or ACS.  Urinalysis was equivocal/abnormal and we elected to start antibiotics while awaiting culture.  Encouraged ongoing primary care follow-up.  Zio patch placed in the ER for further cardiac monitoring as outpatient.  Return precautions discussed prior to discharge.      Diagnosis:    ICD-10-CM    1. Dizziness  R42       2. Palpitations  R00.2       3. Abnormal urinalysis  R82.90            Discharge Medications:  Discharge Medication List as of 7/27/2023  9:54 AM        START taking these medications    Details   cephALEXin (KEFLEX) 500 MG capsule Take 1 capsule (500 mg) by mouth 2 times daily for 7 days, Disp-14 capsule, R-0, E-Prescribe      !! meclizine (ANTIVERT) 25 MG tablet Take 1 tablet (25 mg) by mouth 3 times daily as needed for dizziness, Disp-30 tablet, R-0, E-Prescribe       !! - Potential duplicate medications found. Please discuss with provider.              Mark Rojas MD  07/27/23 [id number]

## 2023-07-28 LAB — BACTERIA UR CULT: ABNORMAL

## 2023-07-29 ENCOUNTER — TELEPHONE (OUTPATIENT)
Dept: FAMILY MEDICINE | Facility: CLINIC | Age: 83
End: 2023-07-29
Payer: COMMERCIAL

## 2023-07-29 NOTE — TELEPHONE ENCOUNTER
Reason for Call:  Appointment Request    Patient requesting this type of appt: Chronic Diease Management/Medication/Follow-Up    Requested provider: Kristian Gupta    Reason patient unable to be scheduled: Not within requested timeframe    When does patient want to be seen/preferred time: 1-2 days    Comments: Heart monitor follow up, recommended to be seen around 8/3/2023    Could we send this information to you in [facility] or would you prefer to receive a phone call?:   Patient would prefer a phone call   Okay to leave a detailed message?: Yes at Cell number on file:    Telephone Information:   Mobile [phone]       Call taken on 7/29/2023 at 10:17 AM by ROXANN COLEMAN

## 2023-07-29 NOTE — RESULT ENCOUNTER NOTE
Final Urine Culture Report on 7/28/23  [facility] Emergency Dept discharge antibiotic prescribed: Cephalexin (Keflex) 500 mg capsule, 1 capsule (500 mg) by mouth 2 times daily for 7 days.  #1. Bacteria, 10,000 - 50,000 CFU/mL Escherichia coli is SUSCEPTIBLE to Antibiotic.    No change in treatment per [facility] ED lab result Urine Culture protocol.

## 2023-07-31 ENCOUNTER — THERAPY VISIT (OUTPATIENT)
Dept: PHYSICAL THERAPY | Facility: CLINIC | Age: 83
End: 2023-07-31
Payer: COMMERCIAL

## 2023-07-31 DIAGNOSIS — H81.13 BENIGN PAROXYSMAL POSITIONAL VERTIGO, BILATERAL: Primary | ICD-10-CM

## 2023-07-31 DIAGNOSIS — R26.89 BALANCE PROBLEMS: ICD-10-CM

## 2023-07-31 PROCEDURE — 95992 CANALITH REPOSITIONING PROC: CPT | Mod: GP | Performed by: PHYSICAL THERAPIST

## 2023-07-31 PROCEDURE — 97112 NEUROMUSCULAR REEDUCATION: CPT | Mod: GP | Performed by: PHYSICAL THERAPIST

## 2023-07-31 NOTE — PROGRESS NOTES
Assessment & Plan     Benign paroxysmal positional vertigo due to bilateral vestibular disorder  Try to connect her with audiology for the vestibular nystagmogram, if that is not possible through the [facility] system, then she could see the ENT clinic in this building.  I gave her contact information.  - Adult Audiology  Referral; Future  - Adult ENT  Referral; Future    Palpitations  Unclear etiology, follow-up on Holter report when available.      No LOS data to display   Time spent by me doing chart review, history and exam, documentation and further activities per the note     MED REC REQUIRED  Post Medication Reconciliation Status: discharge medications reconciled and changed, per note/orders      Kristian Gupta MD  [facility] SO Zarco is a 82 year old, presenting for the following health issues:  ER F/U (Patient here for an ER follow up from 7/27/2023.)      History of Present Illness       Reason for visit:  Here for an ER Follow up    She eats 2-3 servings of fruits and vegetables daily.She consumes 0 sweetened beverage(s) daily.She exercises with enough effort to increase her heart rate 20 to 29 minutes per day.  She exercises with enough effort to increase her heart rate 6 days per week.   She is taking medications regularly.       ED/UC Followup:    Facility:  [facility] Emergency Dept    Date of visit: 7/278/2023  Reason for visit:   Dizziness    Palpitations    Abnormal urinalysis  Current Status: improving, slowly       82-year-old female with a history of BPPV which has been very slow to improve with physical therapy interventions, negative brain MRI.  She has been trying to get in touch with the [facility] audiology service for a vestibular nystagmogram, but has been unsuccessful.  Her vertigo symptoms seem to be improving but very slowly.  The amount of observed nystagmus with canalith repositioning maneuvers is reported to be  "improving slowly according to her report about what she is being told by her physical therapist.  She developed palpitations and was seen in the emergency department recently.  She has had no palpitations since the emergency department discharge.  She is wearing a Holter monitor.  When she presented to the emergency room she was noted to have sinus rhythm with a rate of 75 bpm.  Review of Systems         Objective    /77 (BP Location: Left arm, Patient Position: Sitting, Cuff Size: Adult Regular)   Pulse 76   Temp 97.4  F (36.3  C) (Temporal)   Resp 20   Wt 74.5 kg (164 lb 3.2 oz)   SpO2 94%   BMI 26.50 kg/m    Body mass index is 26.5 kg/m .  Physical Exam   General: This is a well-appearing female in no acute distress.  She is wearing a Zio patch Holter monitor.  Neurological: Alert and oriented to person place and time, cranial nerves II to XII appear grossly intact, she passes the \"get up and go\" test, Romberg test is negative, she has an unsteady gait.  She has symmetric strength.  Mental State: She seems to have an anxious affect but I did have her fill out a JUAN-7 symptom screen and the score was quite low.  She is well-groomed.                      "

## 2023-08-01 ENCOUNTER — OFFICE VISIT (OUTPATIENT)
Dept: FAMILY MEDICINE | Facility: CLINIC | Age: 83
End: 2023-08-01
Payer: COMMERCIAL

## 2023-08-01 ENCOUNTER — TELEPHONE (OUTPATIENT)
Dept: AUDIOLOGY | Facility: CLINIC | Age: 83
End: 2023-08-01

## 2023-08-01 VITALS
TEMPERATURE: 97.4 F | HEART RATE: 76 BPM | WEIGHT: 164.2 LBS | SYSTOLIC BLOOD PRESSURE: 137 MMHG | DIASTOLIC BLOOD PRESSURE: 77 MMHG | RESPIRATION RATE: 20 BRPM | BODY MASS INDEX: 26.5 KG/M2 | OXYGEN SATURATION: 94 %

## 2023-08-01 DIAGNOSIS — H81.13 BENIGN PAROXYSMAL POSITIONAL VERTIGO DUE TO BILATERAL VESTIBULAR DISORDER: Primary | ICD-10-CM

## 2023-08-01 DIAGNOSIS — R00.2 PALPITATIONS: ICD-10-CM

## 2023-08-01 PROCEDURE — 99214 OFFICE O/P EST MOD 30 MIN: CPT | Performed by: INTERNAL MEDICINE

## 2023-08-01 ASSESSMENT — ANXIETY QUESTIONNAIRES
3. WORRYING TOO MUCH ABOUT DIFFERENT THINGS: SEVERAL DAYS
1. FEELING NERVOUS, ANXIOUS, OR ON EDGE: NEARLY EVERY DAY
5. BEING SO RESTLESS THAT IT IS HARD TO SIT STILL: NOT AT ALL
6. BECOMING EASILY ANNOYED OR IRRITABLE: NOT AT ALL
GAD7 TOTAL SCORE: 4
IF YOU CHECKED OFF ANY PROBLEMS ON THIS QUESTIONNAIRE, HOW DIFFICULT HAVE THESE PROBLEMS MADE IT FOR YOU TO DO YOUR WORK, TAKE CARE OF THINGS AT HOME, OR GET ALONG WITH OTHER PEOPLE: SOMEWHAT DIFFICULT
2. NOT BEING ABLE TO STOP OR CONTROL WORRYING: NOT AT ALL
GAD7 TOTAL SCORE: 4
7. FEELING AFRAID AS IF SOMETHING AWFUL MIGHT HAPPEN: NOT AT ALL

## 2023-08-01 ASSESSMENT — PATIENT HEALTH QUESTIONNAIRE - PHQ9: 5. POOR APPETITE OR OVEREATING: NOT AT ALL

## 2023-08-01 ASSESSMENT — PAIN SCALES - GENERAL: PAINLEVEL: NO PAIN (0)

## 2023-08-01 NOTE — TELEPHONE ENCOUNTER
[facility] Call Center    Phone Message    May a detailed message be left on voicemail: yes     Reason for Call: Appointment Intake    Referring Provider Name: Kristian Gupta MD in  [facility]  Diagnosis and/or Symptoms: Benign paroxysmal positional vertigo due to bilateral vestibular disorder  Balance testing     Action Taken: Message routed to:  [facility] Audiology    Travel Screening: Not Applicable    [facility] Thank you.

## 2023-08-07 ENCOUNTER — THERAPY VISIT (OUTPATIENT)
Dept: PHYSICAL THERAPY | Facility: CLINIC | Age: 83
End: 2023-08-07
Payer: COMMERCIAL

## 2023-08-07 DIAGNOSIS — R42 DIZZINESS: Primary | ICD-10-CM

## 2023-08-07 DIAGNOSIS — R26.89 BALANCE PROBLEMS: ICD-10-CM

## 2023-08-07 PROCEDURE — 95992 CANALITH REPOSITIONING PROC: CPT | Mod: GP | Performed by: PHYSICAL THERAPIST

## 2023-08-07 PROCEDURE — 97112 NEUROMUSCULAR REEDUCATION: CPT | Mod: GP | Performed by: PHYSICAL THERAPIST

## 2023-08-07 PROCEDURE — 97750 PHYSICAL PERFORMANCE TEST: CPT | Mod: GP | Performed by: PHYSICAL THERAPIST

## 2023-08-08 NOTE — PROGRESS NOTES
08/07/23 1000   Appointment Info   Signing clinician's name / credentials Alexandra Montes De Oca MPT   Visits Used 10   Medical Diagnosis Vertigo   PT Tx Diagnosis BPPV, Balance problems   Progress Note/Certification   Start of Care Date 06/15/23   Onset of illness/injury or Date of Surgery 06/12/23   Therapy Frequency 1x/week   Predicted Duration 60 days   Certification date from 06/15/23   Certification date to 08/13/23   Progress Note Due Date 11/09/23   PT Goal 1   Goal Identifier FGA   Goal Description Client will demonstrate improved dynamic standing balance on the FGA scoring at least a 25/30 so that she is not at increased risk for falls.   Goal Progress baseline: 15/30 8/7/23: 23/30   Target Date 08/13/23   PT Goal 2   Goal Identifier BPPV   Goal Description Client will demonstrate negative positional testing for all canals indicating that BPPV has resolved.   Target Date 08/13/23   Goal Progress 8/7/23. Currently still demonstrating positive upbeating nystagmus L > R DHT. .Client now not symptomatic with testing   PT Goal 3   Goal Identifier DHI   Goal Description Client will subjectively report improved symptoms scoring a 10 or less on the DHI   Goal Progress baseline: 22 8/7/23: 32   Target Date 08/13/23   PT Goal 4   Goal Identifier gait   Goal Description Client will be able to independently ambulate in the houseold and community distances of at least 300 ft so that she is at PLOF   Goal Progress currently requires SBA. 8/7/23: currently is mod I or I but can vary day to day.   Target Date 08/13/23   Subjective Report   Subjective Report I am getting better. Notices improvement in balance.  Saw Dr. Gupta last week.   Objective Measure 1   Objective Measure FGA   Details 23/30   Objective Measure 2   Objective Measure DHI   Details 32   Neuromuscular Re-education   Neuromuscular re-ed of mvmt, balance, coord, kinesthetic sense, posture, proprioception minutes (05645) 10   Neuro Re-ed 1 Dizziness   Neuro  Re-ed 1 - Details completed DHI. Scored a 32. Discussed balance fear of going to busier places. Encouraged to start going to grocery, even if she needs to use tinted lenses in freezer aisle due to light sensitivity. Continue gradual exposure to busier environments.  Continue balance exs.   Skilled Intervention education on gradual exposure.   Infrared Goggle Exam or Frenzel Lense Exam   Paloma-Hallpike (Right) Upbeating R torsional   Martina-Hallpike (Right) Comments VERY subtle and mild but still there lasting > 1 min. Client just barely symptomatic on this side. Symtpomatic with return to sitting position   Martina-Hallpike (Left) Upbeating L torsional   Paloma-Hallpike (Left) Comments stronger on this side and lasts > 1 min.   BPPV Canal(s) L Posterior;R Posterior   BPPV Type Cupulolithasis   Standard Canalith Repositioning   Standard canalith repositioning procedure minutes (72187) 15   Canalith Repositioning - Details Retested with goggles. L DHT presents with stronger nystagmus than R. Performed L posterior canal LIbertory Semont maneuver with vibration x 3.  Educated to continue anne semont exs working on the L side more than the R and continue bow and yaw maneuver..  Educated to follow up and get VNG/ENG. Has appt on 9/20/23   Patient Response/Progress client is not symptomatic with testing. Still symptomatic during Rx.   Physical Performance Test/measures   Physical Performance Test/Measurement, Minutes (12922) 15   Physical Performance Test/Measurement Details FGA 23/30   Progress improved 8 points   Plan   Home program anne semleobardo focus more on L posterior canal but do both   Plan for next session continue to work on balance, repositioning maneuvers   Comments   Comments prioritize VNG/ENG.   Total Session Time   Timed Code Treatment Minutes 25   Total Treatment Time (sum of timed and untimed services) 40         [facility]                                                                                    OUTPATIENT PHYSICAL THERAPY    PLAN OF TREATMENT FOR OUTPATIENT REHABILITATION   Patient's Last Name, First Name, Carolee John YOB: 1940   Provider's Name   [facility]   Medical Record No.  [id number]     Onset Date: 06/12/23  Start of Care Date: 06/15/23     Medical Diagnosis:  Vertigo      PT Treatment Diagnosis:  BPPV, Balance problems Plan of Treatment  Frequency/Duration: 1x/week/ 60 days    Certification date from 08/14/23 to 11/09/23         See note for plan of treatment details and functional goals     Alexandra Arboleda, PT                         I CERTIFY THE NEED FOR THESE SERVICES FURNISHED UNDER        THIS PLAN OF TREATMENT AND WHILE UNDER MY CARE     (Physician attestation of this document indicates review and certification of the therapy plan).                Referring Provider:  Kristian Gupta      Initial Assessment  See Epic Evaluation- Start of Care Date: 06/15/23            PLAN  Continue therapy per current plan of care. May decrease to every other week while waiting for VNG/ENG testing    Beginning/End Dates of Progress Note Reporting Period:    6/15/23 to 08/07/2023    Referring Provider:  Kristian Gupta

## 2023-08-17 ENCOUNTER — TRANSFERRED RECORDS (OUTPATIENT)
Dept: HEALTH INFORMATION MANAGEMENT | Facility: CLINIC | Age: 83
End: 2023-08-17
Payer: COMMERCIAL

## 2023-08-17 NOTE — RESULT ENCOUNTER NOTE
The following letter pertains to your most recent diagnostic tests:  Good news!  Your heart monitor test did not demonstrate any dangerous rhythm problems.        Sincerely,    Dr. Gupta

## 2023-09-13 ENCOUNTER — THERAPY VISIT (OUTPATIENT)
Dept: PHYSICAL THERAPY | Facility: CLINIC | Age: 83
End: 2023-09-13
Payer: COMMERCIAL

## 2023-09-13 DIAGNOSIS — R26.89 BALANCE PROBLEMS: ICD-10-CM

## 2023-09-13 DIAGNOSIS — R42 DIZZINESS: Primary | ICD-10-CM

## 2023-09-13 PROCEDURE — 97112 NEUROMUSCULAR REEDUCATION: CPT | Mod: GP | Performed by: PHYSICAL THERAPIST

## 2023-09-19 ENCOUNTER — THERAPY VISIT (OUTPATIENT)
Dept: PHYSICAL THERAPY | Facility: CLINIC | Age: 83
End: 2023-09-19
Payer: COMMERCIAL

## 2023-09-19 DIAGNOSIS — R26.2 DIFFICULTY WALKING: ICD-10-CM

## 2023-09-19 DIAGNOSIS — R26.89 BALANCE PROBLEMS: Primary | ICD-10-CM

## 2023-09-19 PROCEDURE — 97112 NEUROMUSCULAR REEDUCATION: CPT | Mod: GP | Performed by: PHYSICAL THERAPIST

## 2023-09-19 PROCEDURE — 97750 PHYSICAL PERFORMANCE TEST: CPT | Mod: GP | Performed by: PHYSICAL THERAPIST

## 2023-09-19 NOTE — PROGRESS NOTES
09/19/23 1200   Signing Clinician's Name / Credentials   Signing clinician's name / credentials Alexandra Montes De Oca [facility]   Functional Gait Assessment (NATHALIE Christina., Gogo GKaryn F., et al. (2004))   1. GAIT LEVEL SURFACE 2   2. CHANGE IN GAIT SPEED 3   3. GAIT WITH HORIZONTAL HEAD TURNS 3   4. GAIT WITH VERTICAL HEAD TURNS 3   5. GAIT AND PIVOT TURN 3   6. STEP OVER OBSTACLE 3   7. GAIT WITH NARROW BASE OF SUPPORT 1   8. GAIT WITH EYES CLOSED 1   9. AMBULATING BACKWARDS 2   10. STEPS 2   Total Functional Gait Assessment Score   TOTAL SCORE: (MAXIMUM SCORE 30) 23

## 2023-09-20 ENCOUNTER — OFFICE VISIT (OUTPATIENT)
Dept: AUDIOLOGY | Facility: CLINIC | Age: 83
End: 2023-09-20
Payer: COMMERCIAL

## 2023-09-20 DIAGNOSIS — R26.81 UNSTEADINESS: ICD-10-CM

## 2023-09-20 DIAGNOSIS — R42 DIZZINESS AND GIDDINESS: ICD-10-CM

## 2023-09-20 DIAGNOSIS — R42 DIZZINESS: Primary | ICD-10-CM

## 2023-09-20 DIAGNOSIS — H90.3 SENSORY HEARING LOSS, BILATERAL: Primary | ICD-10-CM

## 2023-09-20 PROCEDURE — 92541 SPONTANEOUS NYSTAGMUS TEST: CPT | Performed by: AUDIOLOGIST

## 2023-09-20 PROCEDURE — 92542 POSITIONAL NYSTAGMUS TEST: CPT | Mod: 59 | Performed by: AUDIOLOGIST

## 2023-09-20 PROCEDURE — 92550 TYMPANOMETRY & REFLEX THRESH: CPT | Performed by: AUDIOLOGIST

## 2023-09-20 PROCEDURE — 92537 CALORIC VSTBLR TEST W/REC: CPT | Performed by: AUDIOLOGIST

## 2023-09-20 PROCEDURE — 92557 COMPREHENSIVE HEARING TEST: CPT | Performed by: AUDIOLOGIST

## 2023-09-20 PROCEDURE — 92545 OSCILLATING TRACKING TEST: CPT | Mod: 59 | Performed by: AUDIOLOGIST

## 2023-09-20 NOTE — PROGRESS NOTES
AUDIOLOGY REPORT    SUMMARY: Audiology visit completed. See audiogram for results.      RECOMMENDATIONS: Follow-up with ENT.    Jaydon Hernandez.  Licensed Audiologist  [place] # [id number]

## 2023-09-20 NOTE — Clinical Note
Patient is negative for BPPV during testing! Now, looks like underlying central influences are playing a role in her current sxs/preventing her from fully compensating. I requested her PCP send her to Neurology. Hope this helps.  Thanks, Rhiannon

## 2023-09-20 NOTE — PROGRESS NOTES
"AUDIOLOGY REPORT-BALANCE ASSESSMENT    SUBJECTIVE: Carolee Doherty, 83 year old, was seen in Audiology at the [facility] on 9/20/2023, for videonystagmography (VNG) referred by Kristian Gupta M.D.    Patient presents with chief complaints of dizziness and unsteadiness. Patient describes her dizziness as a sense of being \"off\" and unsteady. Patient denies spinning sensation or lightheadedness. She reports symptoms onset gradually in April/May 2023. Patient reports experiencing more stress around this time due to the passing and burial of her daughter. Patient reports symptoms are near constant and vary in severity whenever she is up and moving. Symptoms resolve when she is laying down and still. Patient reports symptoms are exacerbated by fast head or body movements such as turning right/left quickly, walking while looking up/down quickly, and ambulating in big open spaces. Patient denies increased symptoms with rolling over in bed, looking up, bending down, walking down narrow hallways or while watching TV. Patient reports often restricting her movements and has learned to move cautiously (holding on to walls as she walks, hold someone's arm, ect) to help prevent falls. Patient has undergone treatment for BPPV with vestibular physical therapy this past summer. Patient reports performing the right [place]-Hallpike used to provoke a sense of persistent falling but this has resolved following treatment maneuvers with physical therapy. Patient denies drifting to either side while walking. She denies any falls to date.     Patient's most recent hearing evaluation performed earlier today revealed borderline normal sloping to mild sensorineural hearing loss rising to normal hearing in the right ear and normal sloping to moderate sensorineural hearing loss in the left ear. Patient reports occasional brief ear pain approximately once a year. She denies fluctuations in her hearing. " "Patient denies drainage, aural fullness/pressure, tinnitus, and previous ear surgeries.     Patient denies history of headaches or migraines but does report episodes of visual auras in the absence of headaches/head pain. She describes her auras as \"dots\" which resolve after a few minutes. She does note an increase in the frequency of these episodes over the past few months. Patient denies history of head injuries, concussions, cancer or chemotherapy. Patient denies dizziness provoked by pressure changes such as coughing, sneezing, or blowing her nose. Patient denies sensitivity to loud sounds or dizziness provoked by loud sounds. Patient reports sensitivity to lights, especially fluorescent lighting. Patient reports possible slight movement while sitting still described as feeling \"not intact\". Patient denies double vision. Patient reports previously having difficulty reading (for 3 months) but after use of eyedrops prescribed by her ophthalmologist, this slowly resolved. Patient reports history of bilateral cataract eye surgery. Patient denies consumption of caffeinated beverages, nicotine, alcoholic substances, or use of medications with known vestibular interactions within the past 48 hours.    OBJECTIVE:  Abuse Screening:  Do you feel unsafe at home or work/school? No  Do you feel threatened by someone? No  Does anyone try to keep you from having contact with others, or doing things outside of your home? No  Physical signs of abuse present? No    Videonystagmography (VNG) testing:  Prescreening:  Tympanograms: Normal eardrum mobility bilaterally. Note: this test is completed to determine the status of the middle ear before irrigations are completed. *Please see audiogram for tympanograms.    Ocular range of motion and ocular counter roll: Normal  Cross/cover: Normal  Head Thrust: Negative     Nystagmus Tests:  Gaze-Horizontal with Fixation:   Center: Normal   Right: Normal   Left: Normal  Gaze-Vertical with " Fixation:   Up: Normal   Down: Normal  Gaze with Fixation Denied   Center: Normal   Right: 1-2 degrees/s right and down beating nystagmus, right component possible endpoint/nonsignificant.    Left: 1-2 degrees/s left and down beating nystagmus, left component possible endpoint/nonsignificant.    Up: Normal  High Frequency Headshake:   Horizontal: Negative. No consistent nystagmus, no symptoms.    Vertical: Negative. No consistent nystagmus, patient reports mild dizziness post headshake.     [place]-Hallpike Head Right: Negative for PC BPPV. No consistent nystagmus, no symptoms in supine. Patient reports dizziness/possible head rush sensation upon rising to seated position, no consistent nystagmus.  [place]-Hallpike Head Left: Negative for PC BPPV. No consistent nystagmus, no symptoms in supine. Patient reports mild dizziness upon rising to seated position (less than right), no consistent nystagmus.  Roll Test Head Right: Negative for HC BPPV. No consistent nystagmus, no symptoms.   Roll Test Head Left: Negative for HC BPPV. No consistent nystagmus, no symptoms.     Positional Testing:  Positionals: Supine: 1-2 degrees/s right and up beating nystagmus, suppresses with fixation, no symptoms.   Positionals: Body Right: No consistent nystagmus, no symptoms.   Positionals: Body Left: 2 degrees/s left beating nystagmus, suppresses with fixation, no symptoms.   Positionals: Pre-Caloric: No consistent nystagmus, no symptoms.     Oculomotor Tests:  Saccades: Normal  Anti-saccades: Essentially Normal, patient can complete with slight difficulty.   Pursuit(repeatable): Abnormal    Calorics:  (Tested at 44 degrees and 30 degrees Celsius for 30 seconds for warm and cool water, respectively):  Right Warm Eye Speed: 66 degrees per second right beating  Left Warm Eye Speed: 64 degrees per second left beating  Right Cool Eye Speed: 24 degrees per second left beating  Left Cool Eye Speed: 25 degrees per second right beating  Difference  between ear: 1% left hypofunction. (Greater than 25% considered clinically significant.)  Fixation Index: Normal  Overall caloric test: Normal    Post Irrigations Otoscopy: Normal    ASSESSMENT:    1. Indications of central vestibular system involvement noted on today's exam were as follows:   -Abnormal Pursuit testing (repeatable).  -Mild geotropic and down beating nystagmus evident in Gaze Right/Left without Fixation; geotropic component possible endpoint/nonsignificant.   -Mild right and up beating nystagmus evident in Supine Positional.     2. There were no significant indications of peripheral vestibular system involvement noted on today's exam.       PLAN:  Patient would likely benefit from a referral to Neurology pending further review by the referring provider. Follow-up with Dr. Kristian Gupta regarding today's results and for medical management. Please call this clinic at [phone] with questions regarding these results or recommendations.     NAYE Quiñones.   Audiology Doctoral Student   [place] #[id number]      I was present with the patient for the entire Audiology appointment including all procedures/testing performed by the AuD student, and agree with the assessment and plan as documented.     Jaydon Spears. CCC-A  Vestibular Audiologist   [place] #[id number]

## 2023-10-23 ENCOUNTER — TELEPHONE (OUTPATIENT)
Dept: FAMILY MEDICINE | Facility: CLINIC | Age: 83
End: 2023-10-23
Payer: COMMERCIAL

## 2023-10-23 NOTE — TELEPHONE ENCOUNTER
Patient reports terrible cough and cold that started Saturday night. States the she and her  are just worn out. No fever.  Patient's  tested negative once for Covid 19.     Advised to retest and to call back. Patient agrees with plan.  Krista Nails RN

## 2023-10-31 ENCOUNTER — TELEPHONE (OUTPATIENT)
Dept: FAMILY MEDICINE | Facility: CLINIC | Age: 83
End: 2023-10-31
Payer: COMMERCIAL

## 2023-10-31 NOTE — TELEPHONE ENCOUNTER
To PCP    Patient wants PCP to be aware she has URI but will come to appointment with a mask on.     Kavita Lang RN on 10/31/2023 at 1:57 PM

## 2023-11-01 ENCOUNTER — OFFICE VISIT (OUTPATIENT)
Dept: FAMILY MEDICINE | Facility: CLINIC | Age: 83
End: 2023-11-01
Payer: COMMERCIAL

## 2023-11-01 ENCOUNTER — ANCILLARY PROCEDURE (OUTPATIENT)
Dept: GENERAL RADIOLOGY | Facility: CLINIC | Age: 83
End: 2023-11-01
Attending: INTERNAL MEDICINE
Payer: COMMERCIAL

## 2023-11-01 VITALS
OXYGEN SATURATION: 93 % | BODY MASS INDEX: 25.88 KG/M2 | HEIGHT: 66 IN | WEIGHT: 161 LBS | DIASTOLIC BLOOD PRESSURE: 80 MMHG | SYSTOLIC BLOOD PRESSURE: 148 MMHG | HEART RATE: 94 BPM | RESPIRATION RATE: 16 BRPM | TEMPERATURE: 97.1 F

## 2023-11-01 DIAGNOSIS — R05.1 ACUTE COUGH: ICD-10-CM

## 2023-11-01 DIAGNOSIS — Z12.31 VISIT FOR SCREENING MAMMOGRAM: ICD-10-CM

## 2023-11-01 DIAGNOSIS — Z00.00 ENCOUNTER FOR MEDICARE ANNUAL WELLNESS EXAM: Primary | ICD-10-CM

## 2023-11-01 DIAGNOSIS — J18.9 PNEUMONIA OF RIGHT LOWER LOBE DUE TO INFECTIOUS ORGANISM: ICD-10-CM

## 2023-11-01 DIAGNOSIS — I10 BENIGN ESSENTIAL HYPERTENSION: ICD-10-CM

## 2023-11-01 LAB
BASOPHILS # BLD AUTO: 0 10E3/UL (ref 0–0.2)
BASOPHILS NFR BLD AUTO: 0 %
CHOLEST SERPL-MCNC: 149 MG/DL
CREAT SERPL-MCNC: 0.71 MG/DL (ref 0.51–0.95)
EGFRCR SERPLBLD CKD-EPI 2021: 85 ML/MIN/1.73M2
EOSINOPHIL # BLD AUTO: 0.2 10E3/UL (ref 0–0.7)
EOSINOPHIL NFR BLD AUTO: 2 %
ERYTHROCYTE [DISTWIDTH] IN BLOOD BY AUTOMATED COUNT: 13.6 % (ref 10–15)
HBA1C MFR BLD: 6.1 % (ref 0–5.6)
HCT VFR BLD AUTO: 41.5 % (ref 35–47)
HDLC SERPL-MCNC: 50 MG/DL
HGB BLD-MCNC: 13.6 G/DL (ref 11.7–15.7)
IMM GRANULOCYTES # BLD: 0 10E3/UL
IMM GRANULOCYTES NFR BLD: 0 %
LDLC SERPL CALC-MCNC: 77 MG/DL
LYMPHOCYTES # BLD AUTO: 2.1 10E3/UL (ref 0.8–5.3)
LYMPHOCYTES NFR BLD AUTO: 17 %
MCH RBC QN AUTO: 29.1 PG (ref 26.5–33)
MCHC RBC AUTO-ENTMCNC: 32.8 G/DL (ref 31.5–36.5)
MCV RBC AUTO: 89 FL (ref 78–100)
MONOCYTES # BLD AUTO: 0.9 10E3/UL (ref 0–1.3)
MONOCYTES NFR BLD AUTO: 7 %
NEUTROPHILS # BLD AUTO: 9.2 10E3/UL (ref 1.6–8.3)
NEUTROPHILS NFR BLD AUTO: 74 %
NONHDLC SERPL-MCNC: 99 MG/DL
PLATELET # BLD AUTO: 452 10E3/UL (ref 150–450)
RBC # BLD AUTO: 4.67 10E6/UL (ref 3.8–5.2)
TRIGL SERPL-MCNC: 112 MG/DL
WBC # BLD AUTO: 12.4 10E3/UL (ref 4–11)

## 2023-11-01 PROCEDURE — 71046 X-RAY EXAM CHEST 2 VIEWS: CPT | Mod: TC | Performed by: RADIOLOGY

## 2023-11-01 PROCEDURE — G0439 PPPS, SUBSEQ VISIT: HCPCS | Performed by: INTERNAL MEDICINE

## 2023-11-01 PROCEDURE — 83036 HEMOGLOBIN GLYCOSYLATED A1C: CPT | Mod: GZ | Performed by: INTERNAL MEDICINE

## 2023-11-01 PROCEDURE — 82565 ASSAY OF CREATININE: CPT | Performed by: INTERNAL MEDICINE

## 2023-11-01 PROCEDURE — 80061 LIPID PANEL: CPT | Performed by: INTERNAL MEDICINE

## 2023-11-01 PROCEDURE — 85025 COMPLETE CBC W/AUTO DIFF WBC: CPT | Performed by: INTERNAL MEDICINE

## 2023-11-01 PROCEDURE — 36415 COLL VENOUS BLD VENIPUNCTURE: CPT | Performed by: INTERNAL MEDICINE

## 2023-11-01 PROCEDURE — 99214 OFFICE O/P EST MOD 30 MIN: CPT | Mod: 25 | Performed by: INTERNAL MEDICINE

## 2023-11-01 RX ORDER — DOXYCYCLINE 100 MG/1
100 CAPSULE ORAL 2 TIMES DAILY
Qty: 14 CAPSULE | Refills: 0 | Status: SHIPPED | OUTPATIENT
Start: 2023-11-01 | End: 2023-11-01

## 2023-11-01 RX ORDER — DOXYCYCLINE 100 MG/1
100 CAPSULE ORAL 2 TIMES DAILY
Qty: 14 CAPSULE | Refills: 0 | Status: SHIPPED | OUTPATIENT
Start: 2023-11-01 | End: 2024-07-31

## 2023-11-01 ASSESSMENT — PAIN SCALES - GENERAL: PAINLEVEL: NO PAIN (0)

## 2023-11-01 ASSESSMENT — ACTIVITIES OF DAILY LIVING (ADL): CURRENT_FUNCTION: NO ASSISTANCE NEEDED

## 2023-11-01 NOTE — PATIENT INSTRUCTIONS
Patient Education   Personalized Prevention Plan  You are due for the preventive services outlined below.  Your care team is available to assist you in scheduling these services.  If you have already completed any of these items, please share that information with your care team to update in your medical record.  Health Maintenance Due   Topic Date Due     Annual Wellness Visit  03/03/2023

## 2023-11-01 NOTE — RESULT ENCOUNTER NOTE
The following letter pertains to your most recent diagnostic tests:    As we discussed by phone, you have pneumonia in the right lower lung.  In addition to the doxycycline that I discussed with you in clinic, I think we should add another antibiotic known as Augmentin to make sure that we cover all the potential bacteria that might be causing this pneumonia.  Please take both antibiotics twice daily for 1 week.  Hopefully, you should be feeling improved after several doses, but it may take several weeks until all the symptoms resolved.  Return to our office in mid December to have a chest x-ray appointment to document that the shadows on the lung resolved.      Sincerely,    Dr. Gupta

## 2023-11-01 NOTE — PROGRESS NOTES
"SUBJECTIVE:   Carolee is a 83 year old who presents for Preventive Visit.      Are you in the first 12 months of your Medicare coverage?  No    History of Present Illness       Reason for visit:  Cough and cold  Symptom onset:  3-4 weeks ago  Symptom intensity:  Severe  Symptom progression:  Staying the same    She eats 2-3 servings of fruits and vegetables daily.She consumes 0 sweetened beverage(s) daily.She exercises with enough effort to increase her heart rate 9 or less minutes per day.  She exercises with enough effort to increase her heart rate 3 or less days per week.   She is taking medications regularly.  She is not taking prescribed medications regularly due to None.  Healthy Habits:     In general, how would you rate your overall health?  Very good    Frequency of exercise:  2-3 days/week    Duration of exercise:  30-45 minutes    Do you usually eat at least 4 servings of fruit and vegetables a day, include whole grains    & fiber and avoid regularly eating high fat or \"junk\" foods?  Yes    Taking medications regularly:  0    Barriers to taking medications:  None    Medication side effects:  None    Ability to successfully perform activities of daily living:  No assistance needed    Hearing Impairment:  No hearing concerns    In the past 6 months, have you been bothered by leaking of urine?  No    In general, how would you rate your overall mental or emotional health?  Very good    Additional concerns today:  No      Today's PHQ-2 Score:       11/1/2023    10:07 AM   PHQ-2 ( 1999 [employer])   Q1: Little interest or pleasure in doing things 0   Q2: Feeling down, depressed or hopeless 0   PHQ-2 Score 0   Q1: Little interest or pleasure in doing things Not at all   Q2: Feeling down, depressed or hopeless Not at all   PHQ-2 Score 0           Have you ever done Advance Care Planning? (For example, a Health Directive, POLST, or a discussion with a medical provider or your loved ones about your wishes): No, advance " care planning information given to patient to review.  Patient plans to discuss their wishes with loved ones or provider.         Fall risk  Fallen 2 or more times in the past year?: No  Any fall with injury in the past year?: No    Cognitive Screening   1) Repeat 3 items (Leader, Season, Table)    2) Clock draw: NORMAL  3) 3 item recall: Recalls 3 objects  Results: 3 items recalled: COGNITIVE IMPAIRMENT LESS LIKELY    Mini-CogTM Copyright AYAD Vargas. Licensed by the author for use in [facility]; reprinted with permission (randall@[email or web address]). All rights reserved.      Do you have sleep apnea, excessive snoring or daytime drowsiness? : no    Reviewed and updated as needed this visit by clinical staff   Tobacco   Meds   Med Hx  Surg Hx  Fam Hx          Reviewed and updated as needed this visit by Provider   Tobacco     Med Hx  Surg Hx  Fam Hx         Social History     Tobacco Use    Smoking status: Former     Packs/day: 0.10     Years: 3.00     Additional pack years: 0.00     Total pack years: 0.30     Types: Cigarettes    Smokeless tobacco: Never   Substance Use Topics    Alcohol use: No             4/27/2022     1:08 PM   Alcohol Use   Prescreen: >3 drinks/day or >7 drinks/week? Not Applicable          No data to display              Do you have a current opioid prescription? No  Do you use any other controlled substances or medications that are not prescribed by a provider? None      Current providers sharing in care for this patient include:   Patient Care Team:  Kristian Gupta MD as PCP - General (Internal Medicine)  Kristian Gupta MD as Assigned PCP    The following health maintenance items are reviewed in Epic and correct as of today:  Health Maintenance   Topic Date Due    MEDICARE ANNUAL WELLNESS VISIT  03/03/2023    ANNUAL REVIEW OF HM ORDERS  05/31/2024    FALL RISK ASSESSMENT  11/01/2024    DTAP/TDAP/TD IMMUNIZATION (2 - Td or Tdap) 01/04/2026    DEXA  01/01/2027    ADVANCE CARE  PLANNING  11/01/2028    PHQ-2 (once per calendar year)  Completed    INFLUENZA VACCINE  Completed    Pneumococcal Vaccine: 65+ Years  Completed    ZOSTER IMMUNIZATION  Completed    RSV VACCINE 60+  Completed    COVID-19 Vaccine  Completed    IPV IMMUNIZATION  Aged Out    HPV IMMUNIZATION  Aged Out    MENINGITIS IMMUNIZATION  Aged Out    RSV MONOCLONAL ANTIBODY  Aged Out     BP Readings from Last 3 Encounters:   11/01/23 (!) 148/80   08/01/23 137/77   07/27/23 (!) 144/59    Wt Readings from Last 3 Encounters:   11/01/23 73 kg (161 lb)   08/01/23 74.5 kg (164 lb 3.2 oz)   07/27/23 72.6 kg (160 lb)                  Patient Active Problem List   Diagnosis    Benign essential hypertension    Heart murmur    Dizziness     Past Surgical History:   Procedure Laterality Date    COLONOSCOPY      COLONOSCOPY N/A 4/4/2017    Procedure: COLONOSCOPY;  Surgeon: Yaakov Middleton MD;  Location:  GI    PHACOEMULSIFICATION CLEAR CORNEA WITH STANDARD INTRAOCULAR LENS IMPLANT  9/5/2012    Procedure: PHACOEMULSIFICATION CLEAR CORNEA WITH STANDARD INTRAOCULAR LENS IMPLANT;  RIGHT PHACOEMULSIFCATION CLEAR CORNEA WITH STANDARD INTRAOCULAR LENS IMPLANT ;  Surgeon: Beto Gaston MD;  Location: [facility]    PHACOEMULSIFICATION CLEAR CORNEA WITH STANDARD INTRAOCULAR LENS IMPLANT  10/31/2012    Procedure: PHACOEMULSIFICATION CLEAR CORNEA WITH STANDARD INTRAOCULAR LENS IMPLANT;  LEFT PHACOEMULSIFICATION CLEAR CORNEA WITH STANDARD INTRAOCULAR LENS IMPLANT ;  Surgeon: Beto Gaston MD;  Location: [facility]       Social History     Tobacco Use    Smoking status: Former     Packs/day: 0.10     Years: 3.00     Additional pack years: 0.00     Total pack years: 0.30     Types: Cigarettes    Smokeless tobacco: Never   Substance Use Topics    Alcohol use: No     Family History   Problem Relation Age of Onset    Family History Negative Mother     Family History Negative Father     Diabetes No family hx of     Coronary Artery Disease No family hx of      "Hypertension No family hx of     Hyperlipidemia No family hx of     Cerebrovascular Disease No family hx of     Breast Cancer No family hx of     Colon Cancer No family hx of     Prostate Cancer No family hx of     Other Cancer No family hx of     Depression No family hx of     Anxiety Disorder No family hx of     Mental Illness No family hx of     Substance Abuse No family hx of     Anesthesia Reaction No family hx of     Asthma No family hx of     Osteoporosis No family hx of     Genetic Disorder No family hx of     Thyroid Disease No family hx of     Obesity No family hx of     Unknown/Adopted No family hx of          Current Outpatient Medications   Medication Sig Dispense Refill    diltiazem ER COATED BEADS (CARTIA XT) 180 MG 24 hr capsule TAKE 1 CAPSULE BY MOUTH EVERY DAY. 90 capsule 3    meclizine (ANTIVERT) 25 MG tablet Take 1 tablet (25 mg) by mouth 3 times daily as needed for dizziness 30 tablet 0    meclizine (ANTIVERT) 25 MG tablet Take 1 tablet (25 mg) by mouth every 6 hours as needed for dizziness 30 tablet 1           Pertinent mammograms are reviewed under the imaging tab.    Review of Systems  Constitutional, HEENT, cardiovascular, pulmonary, gi and gu systems are negative, except as otherwise noted.    OBJECTIVE:   BP (!) 148/80 (BP Location: Left arm, Patient Position: Sitting, Cuff Size: Adult Regular)   Pulse 94   Temp 97.1  F (36.2  C) (Temporal)   Resp 16   Ht 1.676 m (5' 6\")   Wt 73 kg (161 lb)   SpO2 93%   BMI 25.99 kg/m   Estimated body mass index is 25.99 kg/m  as calculated from the following:    Height as of this encounter: 1.676 m (5' 6\").    Weight as of this encounter: 73 kg (161 lb).  Physical Exam  GENERAL APPEARANCE: healthy, alert and no distress  EYES: Eyes grossly normal to inspection, PERRL and conjunctivae and sclerae normal  HENT: ear canals and TM's normal, nose and mouth without ulcers or lesions, oropharynx clear and oral mucous membranes moist  NECK: no adenopathy, " no asymmetry, masses, or scars and thyroid normal to palpation  RESP: breathing is NOT labored, speaks in full sentences, not toxic, wheezing and rhonchi localized to right lower lung field  CV: Heart with regular rate and rhythm.   ABDOMEN: soft, nontender, no hepatosplenomegaly, no masses and bowel sounds normal  MS: no musculoskeletal defects are noted and gait is age appropriate without ataxia  SKIN: no suspicious lesions or rashes  NEURO: Normal strength and tone, sensory exam grossly normal, mentation intact and speech normal  PSYCH: mentation appears normal and affect normal/bright        ASSESSMENT / PLAN:       ICD-10-CM    1. Encounter for Medicare annual wellness exam  Z00.00 Hemoglobin A1c     Lipid panel reflex to direct LDL Fasting      2. Pneumonia of right lower lobe due to infectious organism  J18.9 doxycycline hyclate (VIBRAMYCIN) 100 MG capsule      3. Acute cough  R05.1 XR Chest 2 Views     CBC with platelets and differential      4. Benign essential hypertension  I10 Creatinine        Clinically, suspect pneumonia based on time course of illness and exam findings, start doxycycline, obtain chest x-ray and CBC to further characterize; since she does not appear toxic and sats are OK, I think we can start with outpatient management   Blood pressure is high today, but I think this is due to acute illness, her home reading have been in 130's systolic by her report     COUNSELING:  Reviewed preventive health counseling, as reflected in patient instructions  Special attention given to:       Regular exercise       Healthy diet/nutrition       Immunizations  Up to date         She reports that she has quit smoking. She has a 0.30 pack-year smoking history. She has never used smokeless tobacco.      Appropriate preventive services were discussed with this patient, including applicable screening as appropriate for fall prevention, nutrition, physical activity, Tobacco-use cessation, weight loss and  cognition.  Checklist reviewing preventive services available has been given to the patient.    Reviewed patients plan of care and provided an AVS. The Basic Care Plan (routine screening as documented in Health Maintenance) for Carolee meets the Care Plan requirement. This Care Plan has been established and reviewed with the Patient.        Kristian Gupta MD  [facility]    Identified Health Risks:

## 2023-11-02 NOTE — RESULT ENCOUNTER NOTE
The following letter pertains to your most recent diagnostic tests:    -Your cholesterol panel looks healthy.     -Tests of kidney function are stable for you (Creatinine, GFR).       -The white blood cell count and platelets are elevated which is common in the setting of infection (pneumonia).    -Your hemoglobin A1c test which averages your blood sugars over the last 3 months returned at 6.1 which is in the prediabetes range.      Bottom line:  The labs look OK given the presence of pneumonia except for the blood sugar test.    Prediabetes is a condition in which blood sugar levels are higher than normal but not high enough to be diagnosed as diabetes. If left untreated, prediabetes can progress to type 2 diabetes, a serious condition that can lead to many health problems.      The most effective way to prevent prediabetes from progressing to diabetes is to make healthy lifestyle changes. This includes eating a healthy diet that is low in sugar and starches (carbohydrates).  It is particularly important to avoid foods that contain added sugars such as high fructose corn syrup.  Soda pop, juices, sport drinks, candies and sweets commonly contain added sugars.     In addition to changing your diet, increasing physical activity can prevent progression to diabetes. Regular exercise can help improve insulin sensitivity and reduce blood sugar levels. Aim for at 150 minutes of moderate-intensity physical activity each week.  This could include brisk walking, cycling, or swimming.    Losing weight is an effective way to prevent prediabetes from progressing to diabetes. Even a modest weight loss of 5-10% of you current body weight can make a significant difference in reducing your risk of developing type 2 diabetes.    A combination of healthy eating and regular exercise can help you lose weight and reduce your risk of developing type 2 diabetes.            Follow up:  Return in 6 weeks or so for a follow up chest x-ray.   We can recheck hemoglobin A1c in one year.        Sincerely,    Dr. Gupta

## 2023-11-13 NOTE — PROGRESS NOTES
09/19/23 0500   Appointment Info   Signing clinician's name / credentials Alexandra Montes De Oca MPT   Visits Used 12   Medical Diagnosis Vertigo   PT Tx Diagnosis BPPV, Balance problems   Progress Note/Certification   Start of Care Date 06/15/23   Onset of illness/injury or Date of Surgery 06/12/23   Therapy Frequency 1x/week   Predicted Duration 60 days   Certification date from 08/14/23   Certification date to 11/09/23   Progress Note Due Date 11/09/23   PT Goal 1   Goal Identifier FGA   Goal Description Client will demonstrate improved dynamic standing balance on the FGA scoring at least a 25/30 so that she is not at increased risk for falls.   Goal Progress baseline: 15/30 8/7/23: 23/30   Target Date 08/13/23   PT Goal 2   Goal Identifier BPPV   Goal Description Client will demonstrate negative positional testing for all canals indicating that BPPV has resolved.   Goal Progress 8/7/23. Currently still demonstrating positive upbeating nystagmus L > R DHT. .Client now not symptomatic with testing   Target Date 08/13/23   PT Goal 3   Goal Identifier DHI   Goal Description Client will subjectively report improved symptoms scoring a 10 or less on the DHI   Goal Progress baseline: 22 8/7/23: 32   Target Date 08/13/23   PT Goal 4   Goal Identifier gait   Goal Description Client will be able to independently ambulate in the houseold and community distances of at least 300 ft so that she is at PLOF   Goal Progress currently requires SBA. 8/7/23: currently is mod I or I but can vary day to day.   Target Date 08/13/23   Date Met 09/19/23   Subjective Report   Subjective Report I feel like I am doing more. Reports ongoing fear with open spaces.   Neuromuscular Re-education   Neuromuscular re-ed of mvmt, balance, coord, kinesthetic sense, posture, proprioception minutes (40318) 25   Neuro Re-ed 1 Balance   Neuro Re-ed 1 - Details Weight shifting normal GÓMEZ EO and then EC on variable surface to work on somatosensory awareness.  "Hesitant to shift posterior too much.  On variable surface alternating tapping to a cone. Initially said \"i can't\" and seems to have mental block when attempting activities. Encouraged client to try with bars first but seems to shut down quickly.  Then all of sudden tried it and was able to do 10 reps without LOB. Tried to progress to step on to variable surface and touch to cone but said it was too hard. Even tried to encourage her to use B support and said it was too hard.  Did tiltboard to work on being more comfortable with ankle reactions for balance: ant/post and then static hold. Also did M/L.   Skilled Intervention education on gradual exposure to various environments and movements in order to reduce symptoms and increase independence. Gait with head turns, tandem amb (multiple attempts)   Patient Response/Progress No significant LOB this date, however she is cautious with her movements and was symtpoms were provoked by continuous head turns.  Overall seems anxious with movement and balance. Educated that she is self limiting with not attempting exs initally. Explained how she needs to attempt them with UE support first and then try to gradually work without UE support.   Physical Performance Test/measures   Physical Performance Test/Measurement, Minutes (11207) 20   Physical Performance Test/Measurement Details FGA   Skilled Intervention Scored 23/30. Appeared tense, exhaling deeply out of mouth. Often tried activities multiple times since she can really vary in ability and is anxious. INitially would not try gait EC but told her she did it the last 2x.   Plan   Home program anne coffman focus more on L posterior canal, weight shifting, walking with head turns, speed changes, angely chi   Plan for next session Update balance exs on HEP next visit.   Comments   Comments VNG/ENG scheduled for tomorrow. [facility] emailed audiologists.   Total Session Time   Timed Code Treatment Minutes 45   Total Treatment Time (sum of " timed and untimed services) 45         [facility]                                                                                   OUTPATIENT PHYSICAL THERAPY    PLAN OF TREATMENT FOR OUTPATIENT REHABILITATION   Patient's Last Name, First Name, Carolee John YOB: 1940   Provider's Name   [facility]   Medical Record No.  [id number]     Onset Date: 06/12/23  Start of Care Date: 06/15/23     Medical Diagnosis:  Vertigo      PT Treatment Diagnosis:  BPPV, Balance problems Plan of Treatment  Frequency/Duration: 1x/week/ 60 days    Certification date from  8/14/23 to   9/17/23       See note for plan of treatment details and functional goals     Alexandra Arboleda, PT                         I CERTIFY THE NEED FOR THESE SERVICES FURNISHED UNDER        THIS PLAN OF TREATMENT AND WHILE UNDER MY CARE     (Physician attestation of this document indicates review and certification of the therapy plan).              Referring Provider:  Kristian Gupta    Initial Assessment  See Epic Evaluation- Start of Care Date: 06/15/23            DISCHARGE  Reason for Discharge: Patient chooses to discontinue therapy.  Cancelled further appointments. Going elsewhere to get in sooner    Equipment Issued: none    Discharge Plan: Patient to continue home program.  Continue PT for balance and dizziness    Referring Provider:  Kristian Gupta

## 2023-12-14 ENCOUNTER — ANCILLARY PROCEDURE (OUTPATIENT)
Dept: GENERAL RADIOLOGY | Facility: CLINIC | Age: 83
End: 2023-12-14
Attending: INTERNAL MEDICINE
Payer: COMMERCIAL

## 2023-12-14 DIAGNOSIS — J18.9 PNEUMONIA OF RIGHT LOWER LOBE DUE TO INFECTIOUS ORGANISM: ICD-10-CM

## 2023-12-14 DIAGNOSIS — J18.9 PNEUMONIA OF RIGHT LOWER LOBE DUE TO INFECTIOUS ORGANISM: Primary | ICD-10-CM

## 2023-12-14 PROCEDURE — 71046 X-RAY EXAM CHEST 2 VIEWS: CPT | Mod: TC | Performed by: RADIOLOGY

## 2023-12-14 NOTE — RESULT ENCOUNTER NOTE
The following letter pertains to your most recent diagnostic tests:      Pneumonia looks better on the follow-up chest x-ray, but it does not seem to have completely resolved.  To further investigate, I recommend a CT scan of the chest.  Please call [facility] at [phone] to schedule your CT scan.  I will be in touch with you as soon as I know those results.      Sincerely,    Dr. Gupta

## 2023-12-20 ENCOUNTER — TELEPHONE (OUTPATIENT)
Dept: FAMILY MEDICINE | Facility: CLINIC | Age: 83
End: 2023-12-20
Payer: COMMERCIAL

## 2023-12-20 NOTE — TELEPHONE ENCOUNTER
"Patient called     Is scheduled for CT exam of chest tomorrow     Had 2 chest x-rays done previously - 12/14 and 11/1     States she still has pneumonia but asking why she is getting a CT scan done.     Reviewed provider notes in most recent x-ray with her also reviewed, per CT order notes exam reason: Persistent opacities on chest x-ray after pneumonia treatment     Reviewed what a CT can show vs chest x-ray     Pt states she has lots of scar tissue from history of pneumonia in 1970s. States part of the left lung doesn't work due to history of pneumonia     Had 2 abx in November, they caused soreness on her bottom, using Vaseline. Stools seemed together \"in a big hunk\" but then flushing seemed totally loose but now stools are good. Did discuss with her abx can be hard on gut and temporarily cause diarrhea. Since she is not currently having loose stools would not be concerned, but encouraged her to continue her yogurt      Griselda PARK, Triage RN  [facility]     "

## 2023-12-21 ENCOUNTER — ANCILLARY PROCEDURE (OUTPATIENT)
Dept: CT IMAGING | Facility: CLINIC | Age: 83
End: 2023-12-21
Attending: INTERNAL MEDICINE
Payer: COMMERCIAL

## 2023-12-21 DIAGNOSIS — J18.9 PNEUMONIA OF RIGHT LOWER LOBE DUE TO INFECTIOUS ORGANISM: ICD-10-CM

## 2023-12-21 PROCEDURE — 71250 CT THORAX DX C-: CPT

## 2023-12-22 NOTE — RESULT ENCOUNTER NOTE
The following letter pertains to your most recent diagnostic tests:    The lung CT shows evidence of ongoing inflammation or infection in the lungs.  If you are feeling better, this may represent slower radiographic resolution of your infection after antibiotic therapy then clinical resolution which is commonly the case.  However, if you continue to feel poorly, I would recommend scheduling a follow-up appointment in our clinic for another evaluation.      Also, it should be noted that you have a larger lung nodule in the left upper lung that will require a follow-up CT scan in 3 months to further characterize.  Most lung nodules are not dangerous, but given the size of this particular nodule, I would certainly recommend a 3-month follow-up CT.  Please call [facility] at [phone] to schedule your CT scan in March.        Sincerely,    Dr. Gupta

## 2024-02-15 DIAGNOSIS — R42 DIZZINESS: Primary | ICD-10-CM

## 2024-02-15 RX ORDER — MECLIZINE HYDROCHLORIDE 25 MG/1
25 TABLET ORAL 3 TIMES DAILY PRN
Qty: 30 TABLET | Refills: 0 | Status: SHIPPED | OUTPATIENT
Start: 2024-02-15 | End: 2024-07-31

## 2024-04-08 DIAGNOSIS — I10 BENIGN ESSENTIAL HYPERTENSION: ICD-10-CM

## 2024-04-08 RX ORDER — DILTIAZEM HYDROCHLORIDE 180 MG/1
CAPSULE, EXTENDED RELEASE ORAL
Qty: 90 CAPSULE | Refills: 0 | Status: SHIPPED | OUTPATIENT
Start: 2024-04-08 | End: 2024-07-08

## 2024-06-17 ENCOUNTER — TRANSFERRED RECORDS (OUTPATIENT)
Dept: HEALTH INFORMATION MANAGEMENT | Facility: CLINIC | Age: 84
End: 2024-06-17
Payer: COMMERCIAL

## 2024-07-08 DIAGNOSIS — I10 BENIGN ESSENTIAL HYPERTENSION: ICD-10-CM

## 2024-07-08 RX ORDER — DILTIAZEM HYDROCHLORIDE 180 MG/1
CAPSULE, EXTENDED RELEASE ORAL
Qty: 90 CAPSULE | Refills: 0 | Status: SHIPPED | OUTPATIENT
Start: 2024-07-08

## 2024-07-31 ENCOUNTER — OFFICE VISIT (OUTPATIENT)
Dept: FAMILY MEDICINE | Facility: CLINIC | Age: 84
End: 2024-07-31
Payer: COMMERCIAL

## 2024-07-31 VITALS
HEART RATE: 87 BPM | BODY MASS INDEX: 26.26 KG/M2 | DIASTOLIC BLOOD PRESSURE: 78 MMHG | OXYGEN SATURATION: 94 % | HEIGHT: 66 IN | RESPIRATION RATE: 16 BRPM | SYSTOLIC BLOOD PRESSURE: 135 MMHG | WEIGHT: 163.4 LBS | TEMPERATURE: 98.1 F

## 2024-07-31 DIAGNOSIS — I10 BENIGN ESSENTIAL HYPERTENSION: ICD-10-CM

## 2024-07-31 DIAGNOSIS — R42 DIZZINESS: ICD-10-CM

## 2024-07-31 DIAGNOSIS — F43.23 ADJUSTMENT DISORDER WITH MIXED ANXIETY AND DEPRESSED MOOD: ICD-10-CM

## 2024-07-31 PROCEDURE — 99214 OFFICE O/P EST MOD 30 MIN: CPT | Performed by: INTERNAL MEDICINE

## 2024-07-31 RX ORDER — SERTRALINE HYDROCHLORIDE 25 MG/1
12.5 TABLET, FILM COATED ORAL DAILY
Qty: 45 TABLET | Refills: 3 | Status: SHIPPED | OUTPATIENT
Start: 2024-07-31

## 2024-07-31 ASSESSMENT — PAIN SCALES - GENERAL: PAINLEVEL: NO PAIN (0)

## 2024-07-31 NOTE — PROGRESS NOTES
"  Assessment & Plan     Dizziness  Discussed referral to neurology, but she declined  Wants to try addressing stress and anxiety to see if this improves her sense of balance     Adjustment disorder with mixed anxiety and depressed mood  Start zoloft; side effects and risks discussed; follow up in 3 weeks to assess therapy   Also therapy   - sertraline (ZOLOFT) 25 MG tablet; Take 0.5 tablets (12.5 mg) by mouth daily  - Adult Mental Health  Referral; Future    Benign essential hypertension  OK control; continue brand name cartia             FUTURE APPOINTMENTS:       - 3-4 weeks assess zoloft     Subjective   Carolee is a 83 year old, presenting for the following health issues:  Dizziness    HPI             Dizziness   Present for years  No falls  Negative MRI last summer  Endorses stress  Several deaths in the family        Objective    /78 (BP Location: Right arm)   Pulse 87   Temp 98.1  F (36.7  C) (Tympanic)   Resp 16   Ht 1.676 m (5' 6\")   Wt 74.1 kg (163 lb 6.4 oz)   SpO2 94%   BMI 26.37 kg/m    Body mass index is 26.37 kg/m .  Physical Exam   GEN:  Well appearing, anxious  NEURO:  Alert and oriented to person, place and time. Cranial nerves 2-12 appear grossly intact.   Passes 'get up and go test'.  Normal gait.              Signed Electronically by: Kristian Gupta MD    "

## 2024-08-26 ENCOUNTER — VIRTUAL VISIT (OUTPATIENT)
Dept: FAMILY MEDICINE | Facility: CLINIC | Age: 84
End: 2024-08-26
Attending: INTERNAL MEDICINE
Payer: COMMERCIAL

## 2024-08-26 DIAGNOSIS — F43.23 ADJUSTMENT DISORDER WITH MIXED ANXIETY AND DEPRESSED MOOD: ICD-10-CM

## 2024-08-26 PROCEDURE — 99443 PR PHYSICIAN TELEPHONE EVALUATION 21-30 MIN: CPT | Mod: 93 | Performed by: INTERNAL MEDICINE

## 2024-08-26 NOTE — PROGRESS NOTES
Carolee is a 84 year old who is being evaluated via a billable video visit.    What phone number would you like to be contacted at? [phone]  How would you like to obtain your AVS?       Assessment & Plan     Adjustment disorder with mixed anxiety and depressed mood  Discussed increasing sertraline, she declined  Wants to keep the dose the same and establish with talk therapist  If she does decide the increase to 25 mg daily, she will inform us    - Adult Mental Health  Referral          FUTURE APPOINTMENTS:       - Follow-up for annual visit or as needed    Subjective   Carolee is a 84 year old, presenting for the following health issues:  Follow Up and Recheck Medication        8/26/2024     9:32 AM   Additional Questions   Roomed by Janett   Accompanied by Not applicable, by themselves     Video Start Time:     HPI           Zoloft is well-tolerating and helping her with her mood and improving her sense of balance  She noticed that she laughs more and whistles more since starting the drug       Objective           Vitals:  No vitals were obtained today due to virtual visit.    Physical Exam   She sounds comfortable and by phone affect seems improved          Telephone-Visit Details    Patient visit on August 26, 2024          Started at 9:39 AM          Ended at 10:02 AM          Total length of 22m52s         Visit was conducted over [employer] . Patient was informed of the benefits and limitations of telemedicine. Patient consented to real time communication over audio, video, and/or data.         Signed, Dr. Kristian Gupta      Signed Electronically by: Kristian Gupta MD

## 2024-10-08 DIAGNOSIS — I10 BENIGN ESSENTIAL HYPERTENSION: ICD-10-CM

## 2024-10-08 RX ORDER — DILTIAZEM HYDROCHLORIDE 180 MG/1
CAPSULE, EXTENDED RELEASE ORAL
Qty: 90 CAPSULE | Refills: 0 | Status: SHIPPED | OUTPATIENT
Start: 2024-10-08

## 2024-10-16 ENCOUNTER — ANCILLARY PROCEDURE (OUTPATIENT)
Dept: GENERAL RADIOLOGY | Facility: CLINIC | Age: 84
End: 2024-10-16
Attending: FAMILY MEDICINE
Payer: COMMERCIAL

## 2024-10-16 ENCOUNTER — OFFICE VISIT (OUTPATIENT)
Dept: URGENT CARE | Facility: URGENT CARE | Age: 84
End: 2024-10-16
Payer: COMMERCIAL

## 2024-10-16 VITALS
DIASTOLIC BLOOD PRESSURE: 76 MMHG | WEIGHT: 162 LBS | BODY MASS INDEX: 26.15 KG/M2 | SYSTOLIC BLOOD PRESSURE: 146 MMHG | RESPIRATION RATE: 18 BRPM | HEART RATE: 87 BPM | TEMPERATURE: 97.4 F | OXYGEN SATURATION: 90 %

## 2024-10-16 DIAGNOSIS — R05.1 ACUTE COUGH: Primary | ICD-10-CM

## 2024-10-16 DIAGNOSIS — R91.8 PULMONARY NODULES: ICD-10-CM

## 2024-10-16 DIAGNOSIS — R05.1 ACUTE COUGH: ICD-10-CM

## 2024-10-16 PROCEDURE — 71046 X-RAY EXAM CHEST 2 VIEWS: CPT | Mod: TC | Performed by: RADIOLOGY

## 2024-10-16 PROCEDURE — 99214 OFFICE O/P EST MOD 30 MIN: CPT | Performed by: FAMILY MEDICINE

## 2024-10-16 RX ORDER — AZITHROMYCIN 250 MG/1
TABLET, FILM COATED ORAL
Qty: 6 TABLET | Refills: 0 | Status: SHIPPED | OUTPATIENT
Start: 2024-10-16 | End: 2024-10-21

## 2024-10-16 NOTE — Clinical Note
[facility]fani nurses, can you reach out to Carolee and check on her today or tomorrow. I am treating her as a pneumonia. I noted she lung nodules on a CT scan last fall and if I am reading it correctly she would be recommended to have a follow up scan. Thanks, Beto Kowalski MD

## 2024-10-16 NOTE — PROGRESS NOTES
(R05.1) Acute cough  (primary encounter diagnosis)  Comment: possibly pneumonia. Discussed options. She has a borderline O2 but asymptomatic. Will monitor closely for improvement.   Plan: XR Chest 2 Views, azithromycin (ZITHROMAX) 250         MG tablet, amoxicillin-clavulanate (AUGMENTIN)         875-125 MG tablet           -------------------------------  Carolee Doherty with presents with 15 days symptoms including productive cough. With pretty much no other symptoms. No cough paroxysms. No shortness of breath. No fever, some fatigue. No nasal congestion.     The patient has a history of pneumonia about a year ago. The patient has no history of asthma or other lung disease    Treatment measures tried include OTC Cough med.  Exposures--no  Recent travel--no    Current Outpatient Medications   Medication Sig Dispense Refill    CARTIA  MG 24 hr capsule Take 1 capsule by mouth once daily 90 capsule 0    sertraline (ZOLOFT) 25 MG tablet Take 0.5 tablets (12.5 mg) by mouth daily 45 tablet 3       ROS otherwise negative for resp., ID,  HEENT symptoms.    Objective: BP (!) 146/76 (BP Location: Left arm, Patient Position: Sitting, Cuff Size: Adult Regular)   Pulse 87   Temp 97.4  F (36.3  C) (Oral)   Resp 18   Wt 73.5 kg (162 lb)   SpO2 90%   Breastfeeding No   BMI 26.15 kg/m    Exam:  GENERAL APPEARANCE: healthy, alert and no distress  EYES: Eyes grossly normal to inspection  HENT: ear canals and TM's normal and nose and mouth without ulcers or lesions  NECK: no adenopathy, no asymmetry, masses, or scars and thyroid normal to palpation  RESP: lungs with rales crackles bases but also noted anterior over the r side. No increased work of breathing.   CV: regular rates and rhythm, no murmur      Cxr Per my interpretation seems ot have infiltrates bilaterally but unclear to me if worse than her previous.   No results found for any visits on 10/16/24.

## 2024-10-17 ENCOUNTER — TELEPHONE (OUTPATIENT)
Dept: FAMILY MEDICINE | Facility: CLINIC | Age: 84
End: 2024-10-17
Payer: COMMERCIAL

## 2024-10-17 DIAGNOSIS — R91.8 PULMONARY NODULES: Primary | ICD-10-CM

## 2024-10-17 NOTE — TELEPHONE ENCOUNTER
I called and left Carolee a message  She should schedule CT to follow up on lung nodule identified in December

## 2024-10-17 NOTE — TELEPHONE ENCOUNTER
Called patient     Advised of PCP response below     Pt states she currently has pneumonia and just did an x-ray yesterday     Carolee, the xray specialist thought there was no change from the previous. I still think the antibiotics would be reasonable but if you have significant side effects let us know. I will have the nurses reach out to you. Beto Kowalski MD   Written by Beto Kowalski MD on 10/17/2024  8:41 AM CDT    Pt wanted to make sure that PCP is aware she currently has pneumonia and had and x-ray yesterday, taking abx. Pt asking if she should wait until pneumonia has improved before getting a CT done?     Appointments in Next Year      Nov 06, 2024 10:30 AM  (Arrive by 10:10 AM)  Annual Wellness Visit with Kristian Gupta MD  [facility] ([facility] ) [phone]     Frank Londono RN  [facility]

## 2024-10-17 NOTE — TELEPHONE ENCOUNTER
Can you please call her and tell her to schedule a CT scan of the chest , I don't know if she read my [employer] message back in December

## 2024-10-29 ENCOUNTER — HOSPITAL ENCOUNTER (OUTPATIENT)
Dept: CT IMAGING | Facility: CLINIC | Age: 84
Discharge: HOME | End: 2024-10-29
Attending: INTERNAL MEDICINE | Admitting: INTERNAL MEDICINE
Payer: COMMERCIAL

## 2024-10-29 DIAGNOSIS — R91.8 PULMONARY NODULES: ICD-10-CM

## 2024-10-29 PROCEDURE — 71250 CT THORAX DX C-: CPT

## 2024-10-29 NOTE — RESULT ENCOUNTER NOTE
The following letter pertains to your most recent diagnostic tests:    Good news! The lung nodule is stable.  We should recheck in one year.        Sincerely,    Dr. Gupta

## 2024-11-06 ENCOUNTER — OFFICE VISIT (OUTPATIENT)
Dept: FAMILY MEDICINE | Facility: CLINIC | Age: 84
End: 2024-11-06
Payer: COMMERCIAL

## 2024-11-06 VITALS
DIASTOLIC BLOOD PRESSURE: 75 MMHG | HEART RATE: 86 BPM | WEIGHT: 162 LBS | SYSTOLIC BLOOD PRESSURE: 139 MMHG | HEIGHT: 66 IN | BODY MASS INDEX: 26.03 KG/M2 | TEMPERATURE: 98.4 F | OXYGEN SATURATION: 96 % | RESPIRATION RATE: 16 BRPM

## 2024-11-06 DIAGNOSIS — R91.8 PULMONARY NODULES: ICD-10-CM

## 2024-11-06 DIAGNOSIS — F43.23 ADJUSTMENT DISORDER WITH MIXED ANXIETY AND DEPRESSED MOOD: ICD-10-CM

## 2024-11-06 DIAGNOSIS — Z00.00 ENCOUNTER FOR MEDICARE ANNUAL WELLNESS EXAM: Primary | ICD-10-CM

## 2024-11-06 DIAGNOSIS — I10 BENIGN ESSENTIAL HYPERTENSION: ICD-10-CM

## 2024-11-06 LAB
ERYTHROCYTE [DISTWIDTH] IN BLOOD BY AUTOMATED COUNT: 13.4 % (ref 10–15)
EST. AVERAGE GLUCOSE BLD GHB EST-MCNC: 123 MG/DL
HBA1C MFR BLD: 5.9 % (ref 0–5.6)
HCT VFR BLD AUTO: 44.1 % (ref 35–47)
HGB BLD-MCNC: 14.7 G/DL (ref 11.7–15.7)
MCH RBC QN AUTO: 29.5 PG (ref 26.5–33)
MCHC RBC AUTO-ENTMCNC: 33.3 G/DL (ref 31.5–36.5)
MCV RBC AUTO: 88 FL (ref 78–100)
PLATELET # BLD AUTO: 247 10E3/UL (ref 150–450)
RBC # BLD AUTO: 4.99 10E6/UL (ref 3.8–5.2)
WBC # BLD AUTO: 9 10E3/UL (ref 4–11)

## 2024-11-06 PROCEDURE — 36415 COLL VENOUS BLD VENIPUNCTURE: CPT | Performed by: INTERNAL MEDICINE

## 2024-11-06 PROCEDURE — 85027 COMPLETE CBC AUTOMATED: CPT | Performed by: INTERNAL MEDICINE

## 2024-11-06 PROCEDURE — 83036 HEMOGLOBIN GLYCOSYLATED A1C: CPT | Performed by: INTERNAL MEDICINE

## 2024-11-06 PROCEDURE — 80048 BASIC METABOLIC PNL TOTAL CA: CPT | Performed by: INTERNAL MEDICINE

## 2024-11-06 PROCEDURE — 80061 LIPID PANEL: CPT | Performed by: INTERNAL MEDICINE

## 2024-11-06 PROCEDURE — G0439 PPPS, SUBSEQ VISIT: HCPCS | Performed by: INTERNAL MEDICINE

## 2024-11-06 SDOH — HEALTH STABILITY: PHYSICAL HEALTH: ON AVERAGE, HOW MANY DAYS PER WEEK DO YOU ENGAGE IN MODERATE TO STRENUOUS EXERCISE (LIKE A BRISK WALK)?: 3 DAYS

## 2024-11-06 ASSESSMENT — SOCIAL DETERMINANTS OF HEALTH (SDOH): HOW OFTEN DO YOU GET TOGETHER WITH FRIENDS OR RELATIVES?: MORE THAN THREE TIMES A WEEK

## 2024-11-06 ASSESSMENT — PAIN SCALES - GENERAL: PAINLEVEL_OUTOF10: NO PAIN (0)

## 2024-11-06 NOTE — PROGRESS NOTES
"Preventive Care Visit  [facility]  Kristian Gupta MD, Internal Medicine  Nov 6, 2024      Assessment & Plan     Encounter for Medicare annual wellness exam    - Basic metabolic panel; Future  - CBC with platelets; Future  - HEMOGLOBIN A1C; Future    Pulmonary nodules  Reminded her to recheck a CT scan in 10/2025  - CT Chest w/o Contrast; Future    Benign essential hypertension  OK on current cartia     Adjustment disorder with mixed anxiety and depressed mood  Stable on zoloft       Patient has been advised of split billing requirements and indicates understanding: Yes        BMI  Estimated body mass index is 26.15 kg/m  as calculated from the following:    Height as of this encounter: 1.676 m (5' 6\").    Weight as of this encounter: 73.5 kg (162 lb).   Weight management plan: Discussed healthy diet and exercise guidelines    Counseling  Appropriate preventive services were addressed with this patient via screening, questionnaire, or discussion as appropriate for fall prevention, nutrition, physical activity, Tobacco-use cessation, social engagement, weight loss and cognition.  Checklist reviewing preventive services available has been given to the patient.  Reviewed patient's diet, addressing concerns and/or questions.   She is at risk for lack of exercise and has been provided with information to increase physical activity for the benefit of her well-being.   She is at risk for psychosocial distress and has been provided with information to reduce risk.   Addressed any concerns about safety while driving.  Information on urinary incontinence and treatment options given to patient.       FUTURE APPOINTMENTS:       - Follow-up for annual visit or as needed    Subjective   Carolee is a 84 year old, presenting for the following:  UTI and Physical        8/26/2024     9:32 AM   Additional Questions   Roomed by Janett   Accompanied by Not applicable, by themselves           \[facility] [place] [facility]\""          Health Care " Directive  Patient does not have a Health Care Directive: Patient states has Advance Directive and will bring in a copy to clinic.      11/6/2024   General Health   How would you rate your overall physical health? Good   Feel stress (tense, anxious, or unable to sleep) Only a little      (!) STRESS CONCERN      11/6/2024   Nutrition   Diet: Regular (no restrictions)            11/6/2024   Exercise   Days per week of moderate/strenous exercise 3 days            11/6/2024   Social Factors   Frequency of gathering with friends or relatives More than three times a week   Worry food won't last until get money to buy more No   Food not last or not have enough money for food? No   Do you have housing? (Housing is defined as stable permanent housing and does not include staying ouside in a car, in a tent, in an abandoned building, in an overnight shelter, or couch-surfing.) Yes   Are you worried about losing your housing? No   Lack of transportation? No   Unable to get utilities (heat,electricity)? No            11/6/2024   Fall Risk   Fallen 2 or more times in the past year? No    Trouble with walking or balance? Yes    Gait Speed Test (Document in seconds) 3   Gait Speed Test Interpretation Less than or equal to 5.00 seconds - PASS       Patient-reported          11/6/2024   Activities of Daily Living- Home Safety   Needs help with the following daily activites None of the above   Safety concerns in the home None of the above            11/6/2024   Dental   Dentist two times every year? Yes            11/6/2024   Hearing Screening   Hearing concerns? None of the above            11/6/2024   Driving Risk Screening   Patient/family members have concerns about driving (!) YES             11/6/2024   General Alertness/Fatigue Screening   Have you been more tired than usual lately? No            11/6/2024   Urinary Incontinence Screening   Bothered by leaking urine in past 6 months Yes            11/6/2024   TB Screening    Were you born outside of the [place]? No                  11/6/2024   Substance Use   Alcohol more than 3/day or more than 7/wk Not Applicable   Do you have a current opioid prescription? No   How severe/bad is pain from 1 to 10? 0/10 (No Pain)   Do you use any other substances recreationally? No    (!) DECLINE       Multiple values from one day are sorted in reverse-chronological order     Social History     Tobacco Use    Smoking status: Former     Current packs/day: 0.10     Average packs/day: 0.1 packs/day for 3.0 years (0.3 ttl pk-yrs)     Types: Cigarettes    Smokeless tobacco: Never   Vaping Use    Vaping status: Never Used   Substance Use Topics    Alcohol use: No    Drug use: No                        Reviewed and updated as needed this visit by Provider                    Past Medical History:   Diagnosis Date    Bronchitis     Edema     Hyperlipidemia     Hyperlipidemia LDL goal <130 2/23/2017    Other and unspecified hyperlipidemia     Undiagnosed cardiac murmurs     Unspecified essential hypertension      Past Surgical History:   Procedure Laterality Date    COLONOSCOPY      COLONOSCOPY N/A 4/4/2017    Procedure: COLONOSCOPY;  Surgeon: Yaakov Middleton MD;  Location:  GI    PHACOEMULSIFICATION CLEAR CORNEA WITH STANDARD INTRAOCULAR LENS IMPLANT  9/5/2012    Procedure: PHACOEMULSIFICATION CLEAR CORNEA WITH STANDARD INTRAOCULAR LENS IMPLANT;  RIGHT PHACOEMULSIFCATION CLEAR CORNEA WITH STANDARD INTRAOCULAR LENS IMPLANT ;  Surgeon: Beto Gaston MD;  Location:  EC    PHACOEMULSIFICATION CLEAR CORNEA WITH STANDARD INTRAOCULAR LENS IMPLANT  10/31/2012    Procedure: PHACOEMULSIFICATION CLEAR CORNEA WITH STANDARD INTRAOCULAR LENS IMPLANT;  LEFT PHACOEMULSIFICATION CLEAR CORNEA WITH STANDARD INTRAOCULAR LENS IMPLANT ;  Surgeon: Beto Gaston MD;  Location:  EC     BP Readings from Last 3 Encounters:   11/06/24 139/75   10/16/24 (!) 146/76   07/31/24 135/78    Wt Readings from Last 3 Encounters:    11/06/24 73.5 kg (162 lb)   10/16/24 73.5 kg (162 lb)   07/31/24 74.1 kg (163 lb 6.4 oz)                  Patient Active Problem List   Diagnosis    Benign essential hypertension    Heart murmur    Dizziness    Pulmonary nodules     Past Surgical History:   Procedure Laterality Date    COLONOSCOPY      COLONOSCOPY N/A 4/4/2017    Procedure: COLONOSCOPY;  Surgeon: Yaakov Middleton MD;  Location:  GI    PHACOEMULSIFICATION CLEAR CORNEA WITH STANDARD INTRAOCULAR LENS IMPLANT  9/5/2012    Procedure: PHACOEMULSIFICATION CLEAR CORNEA WITH STANDARD INTRAOCULAR LENS IMPLANT;  RIGHT PHACOEMULSIFCATION CLEAR CORNEA WITH STANDARD INTRAOCULAR LENS IMPLANT ;  Surgeon: Beto Gaston MD;  Location:  EC    PHACOEMULSIFICATION CLEAR CORNEA WITH STANDARD INTRAOCULAR LENS IMPLANT  10/31/2012    Procedure: PHACOEMULSIFICATION CLEAR CORNEA WITH STANDARD INTRAOCULAR LENS IMPLANT;  LEFT PHACOEMULSIFICATION CLEAR CORNEA WITH STANDARD INTRAOCULAR LENS IMPLANT ;  Surgeon: Beto Gaston MD;  Location:  EC       Social History     Tobacco Use    Smoking status: Former     Current packs/day: 0.10     Average packs/day: 0.1 packs/day for 3.0 years (0.3 ttl pk-yrs)     Types: Cigarettes    Smokeless tobacco: Never   Substance Use Topics    Alcohol use: No     Family History   Problem Relation Age of Onset    Family History Negative Mother     Family History Negative Father     Diabetes No family hx of     Coronary Artery Disease No family hx of     Hypertension No family hx of     Hyperlipidemia No family hx of     Cerebrovascular Disease No family hx of     Breast Cancer No family hx of     Colon Cancer No family hx of     Prostate Cancer No family hx of     Other Cancer No family hx of     Depression No family hx of     Anxiety Disorder No family hx of     Mental Illness No family hx of     Substance Abuse No family hx of     Anesthesia Reaction No family hx of     Asthma No family hx of     Osteoporosis No family hx of      "Genetic Disorder No family hx of     Thyroid Disease No family hx of     Obesity No family hx of     Unknown/Adopted No family hx of          Current Outpatient Medications   Medication Sig Dispense Refill    CARTIA  MG 24 hr capsule Take 1 capsule by mouth once daily 90 capsule 0    sertraline (ZOLOFT) 25 MG tablet Take 0.5 tablets (12.5 mg) by mouth daily 45 tablet 3     Current providers sharing in care for this patient include:  Patient Care Team:  Kristian Gupta MD as PCP - General (Internal Medicine)  Kristian Gupta MD as Assigned PCP    The following health maintenance items are reviewed in Epic and correct as of today:  Health Maintenance   Topic Date Due    BMP  07/27/2024    MEDICARE ANNUAL WELLNESS VISIT  11/01/2024    ANNUAL REVIEW OF HM ORDERS  07/31/2025    FALL RISK ASSESSMENT  11/06/2025    DTAP/TDAP/TD IMMUNIZATION (2 - Td or Tdap) 01/04/2026    DEXA  01/01/2027    ADVANCE CARE PLANNING  11/01/2028    PHQ-2 (once per calendar year)  Completed    INFLUENZA VACCINE  Completed    Pneumococcal Vaccine: 65+ Years  Completed    ZOSTER IMMUNIZATION  Completed    RSV VACCINE  Completed    COVID-19 Vaccine  Completed    HPV IMMUNIZATION  Aged Out    MENINGITIS IMMUNIZATION  Aged Out    RSV MONOCLONAL ANTIBODY  Aged Out    LUNG CANCER SCREENING  Discontinued       A 10 organ systems ROS is negative other than any pertinent positives or negatives previously stated.      Objective    Exam  There were no vitals taken for this visit.   Estimated body mass index is 26.15 kg/m  as calculated from the following:    Height as of 7/31/24: 1.676 m (5' 6\").    Weight as of 10/16/24: 73.5 kg (162 lb).    Physical Exam  GENERAL: alert and no distress  EYES: Eyes grossly normal to inspection, PERRL and conjunctivae and sclerae normal  HENT: ear canals and TM's normal, nose and mouth without ulcers or lesions  NECK: no adenopathy, no asymmetry, masses, or scars  RESP: lungs clear to auscultation - no rales, " rhonchi or wheezes  CV: regular rate and rhythm, normal S1 S2, no S3 or S4, no murmur, click or rub, no peripheral edema  ABDOMEN: soft, nontender, no hepatosplenomegaly, no masses and bowel sounds normal  MS: no gross musculoskeletal defects noted, no edema  SKIN: no suspicious lesions or rashes  NEURO: Normal strength and tone, mentation intact and speech normal  PSYCH: mentation appears normal, affect normal/bright        11/6/2024   Mini Cog   Clock Draw Score 2 Normal   3 Item Recall 3 objects recalled   Mini Cog Total Score 5                 Signed Electronically by: Kristian Gupta MD

## 2024-11-06 NOTE — PATIENT INSTRUCTIONS
Patient Education   Preventive Care Advice   This is general advice given by our system to help you stay healthy. However, your care team may have specific advice just for you. Please talk to your care team about your preventive care needs.  Nutrition  Eat 5 or more servings of fruits and vegetables each day.  Try wheat bread, brown rice and whole grain pasta (instead of white bread, rice, and pasta).  Get enough calcium and vitamin D. Check the label on foods and aim for 100% of the RDA (recommended daily allowance).  Lifestyle  Exercise at least 150 minutes each week  (30 minutes a day, 5 days a week).  Do muscle strengthening activities 2 days a week. These help control your weight and prevent disease.  No smoking.  Wear sunscreen to prevent skin cancer.  Have a dental exam and cleaning every 6 months.  Yearly exams  See your health care team every year to talk about:  Any changes in your health.  Any medicines your care team has prescribed.  Preventive care, family planning, and ways to prevent chronic diseases.  Shots (vaccines)   HPV shots (up to age 26), if you've never had them before.  Hepatitis B shots (up to age 59), if you've never had them before.  COVID-19 shot: Get this shot when it's due.  Flu shot: Get a flu shot every year.  Tetanus shot: Get a tetanus shot every 10 years.  Pneumococcal, hepatitis A, and RSV shots: Ask your care team if you need these based on your risk.  Shingles shot (for age 50 and up)  General health tests  Diabetes screening:  Starting at age 35, Get screened for diabetes at least every 3 years.  If you are younger than age 35, ask your care team if you should be screened for diabetes.  Cholesterol test: At age 39, start having a cholesterol test every 5 years, or more often if advised.  Bone density scan (DEXA): At age 50, ask your care team if you should have this scan for osteoporosis (brittle bones).  Hepatitis C: Get tested at least once in your life.  STIs (sexually  transmitted infections)  Before age 24: Ask your care team if you should be screened for STIs.  After age 24: Get screened for STIs if you're at risk. You are at risk for STIs (including HIV) if:  You are sexually active with more than one person.  You don't use condoms every time.  You or a partner was diagnosed with a sexually transmitted infection.  If you are at risk for HIV, ask about PrEP medicine to prevent HIV.  Get tested for HIV at least once in your life, whether you are at risk for HIV or not.  Cancer screening tests  Cervical cancer screening: If you have a cervix, begin getting regular cervical cancer screening tests starting at age 21.  Breast cancer scan (mammogram): If you've ever had breasts, begin having regular mammograms starting at age 40. This is a scan to check for breast cancer.  Colon cancer screening: It is important to start screening for colon cancer at age 45.  Have a colonoscopy test every 10 years (or more often if you're at risk) Or, ask your provider about stool tests like a FIT test every year or Cologuard test every 3 years.  To learn more about your testing options, visit:   .  For help making a decision, visit:   https://bit.ly/lw18219.  Prostate cancer screening test: If you have a prostate, ask your care team if a prostate cancer screening test (PSA) at age 55 is right for you.  Lung cancer screening: If you are a current or former smoker ages 50 to 80, ask your care team if ongoing lung cancer screenings are right for you.  For informational purposes only. Not to replace the advice of your health care provider. Copyright   2023 [facility]. All rights reserved. Clinically reviewed by the [facility] Transitions Program. [employer] [id number] - REV 01/24.  Preventing Falls: Care Instructions  Injuries and health problems such as trouble walking or poor eyesight can increase your risk of falling. So can some medicines. But there are things you can do to help  "prevent falls. You can exercise to get stronger. You can also arrange your home to make it safer.    Talk to your doctor about the medicines you take. Ask if any of them increase the risk of falls and whether they can be changed or stopped.   Try to exercise regularly. It can help improve your strength and balance. This can help lower your risk of falling.         Practice fall safety and prevention.   Wear low-heeled shoes that fit well and give your feet good support. Talk to your doctor if you have foot problems that make this hard.  Carry a cellphone or wear a medical alert device that you can use to call for help.  Use stepladders instead of chairs to reach high objects. Don't climb if you're at risk for falls. Ask for help, if needed.  Wear the correct eyeglasses, if you need them.        Make your home safer.   Remove rugs, cords, clutter, and furniture from walkways.  Keep your house well lit. Use night-lights in hallways and bathrooms.  Install and use sturdy handrails on stairways.  Wear nonskid footwear, even inside. Don't walk barefoot or in socks without shoes.        Be safe outside.   Use handrails, curb cuts, and ramps whenever possible.  Keep your hands free by using a shoulder bag or backpack.  Try to walk in well-lit areas. Watch out for uneven ground, changes in pavement, and debris.  Be careful in the winter. Walk on the grass or gravel when sidewalks are slippery. Use de-icer on steps and walkways. Add non-slip devices to shoes.    Put grab bars and nonskid mats in your shower or tub and near the toilet. Try to use a shower chair or bath bench when bathing.   Get into a tub or shower by putting in your weaker leg first. Get out with your strong side first. Have a phone or medical alert device in the bathroom with you.   Where can you learn more?  Go to https://www.[employer].net/patiented  Enter G117 in the search box to learn more about \"Preventing Falls: Care Instructions.\"  Current as of: " July 17, 2023  Content Version: 14.2 2024 [employer] [facility] [employer].   Care instructions adapted under license by your healthcare professional. If you have questions about a medical condition or this instruction, always ask your healthcare professional. [employer] disclaims any warranty or liability for your use of this information.    Bladder Training: Care Instructions  Your Care Instructions     Bladder training is used to treat urge incontinence and stress incontinence. Urge incontinence means that the need to urinate comes on so fast that you can't get to a toilet in time. Stress incontinence means that you leak urine because of pressure on your bladder. For example, it may happen when you laugh, cough, or lift something heavy.  Bladder training can increase how long you can wait before you have to urinate. It can also help your bladder hold more urine. And it can give you better control over the urge to urinate.  It is important to remember that bladder training takes a few weeks to a few months to make a difference. You may not see results right away, but don't give up.  Follow-up care is a key part of your treatment and safety. Be sure to make and go to all appointments, and call your doctor if you are having problems. It's also a good idea to know your test results and keep a list of the medicines you take.  How can you care for yourself at home?  Work with your doctor to come up with a bladder training program that is right for you. You may use one or more of the following methods.  Delayed urination  In the beginning, try to keep from urinating for 5 minutes after you first feel the need to go.  While you wait, take deep, slow breaths to relax. Kegel exercises can also help you delay the need to go to the bathroom.  After some practice, when you can easily wait 5 minutes to urinate, try to wait 10 minutes before you urinate.  Slowly increase the waiting period until you are able to control  "when you have to urinate.  Scheduled urination  Empty your bladder when you first wake up in the morning.  Schedule times throughout the day when you will urinate.  Start by going to the bathroom every hour, even if you don't need to go.  Slowly increase the time between trips to the bathroom.  When you have found a schedule that works well for you, keep doing it.  If you wake up during the night and have to urinate, do it. Apply your schedule to waking hours only.  Kegel exercises  These tighten and strengthen pelvic muscles, which can help you control the flow of urine. (If doing these exercises causes pain, stop doing them and talk with your doctor.) To do Kegel exercises:  Squeeze your muscles as if you were trying not to pass gas. Or squeeze your muscles as if you were stopping the flow of urine. Your belly, legs, and buttocks shouldn't move.  Hold the squeeze for 3 seconds, then relax for 5 to 10 seconds.  Start with 3 seconds, then add 1 second each week until you are able to squeeze for 10 seconds.  Repeat the exercise 10 times a session. Do 3 to 8 sessions a day.  When should you call for help?  Watch closely for changes in your health, and be sure to contact your doctor if:    Your incontinence is getting worse.     You do not get better as expected.   Where can you learn more?  Go to https://www.[employer].net/patiented  Enter V684 in the search box to learn more about \"Bladder Training: Care Instructions.\"  Current as of: November 15, 2023  Content Version: 14.2 2024 "[employer] [employer].   Care instructions adapted under license by your healthcare professional. If you have questions about a medical condition or this instruction, always ask your healthcare professional. Healthwise, Incorporated disclaims any warranty or liability for your use of this information.       "

## 2024-11-07 LAB
ANION GAP SERPL CALCULATED.3IONS-SCNC: 12 MMOL/L (ref 7–15)
BUN SERPL-MCNC: 20 MG/DL (ref 8–23)
CALCIUM SERPL-MCNC: 9.8 MG/DL (ref 8.8–10.4)
CHLORIDE SERPL-SCNC: 105 MMOL/L (ref 98–107)
CHOLEST SERPL-MCNC: 179 MG/DL
CREAT SERPL-MCNC: 0.73 MG/DL (ref 0.51–0.95)
EGFRCR SERPLBLD CKD-EPI 2021: 82 ML/MIN/1.73M2
GLUCOSE SERPL-MCNC: 102 MG/DL (ref 70–99)
HCO3 SERPL-SCNC: 23 MMOL/L (ref 22–29)
HDLC SERPL-MCNC: 62 MG/DL
LDLC SERPL CALC-MCNC: 103 MG/DL
NONHDLC SERPL-MCNC: 117 MG/DL
POTASSIUM SERPL-SCNC: 3.7 MMOL/L (ref 3.4–5.3)
SODIUM SERPL-SCNC: 140 MMOL/L (ref 135–145)
TRIGL SERPL-MCNC: 70 MG/DL

## 2024-11-07 NOTE — RESULT ENCOUNTER NOTE
The following letter pertains to your most recent diagnostic tests:    -The metabolic panel shows stable kidney function for you (Creatinine, BUN, GFR), electrolytes (Sodium, Potassium, Calcium) are stable for you, the Glucose (blood sugar) is elevated but it is not in the diabetic range (diabetes is defined as a fasting blood sugar reading greater than 126 on two separate occassions).      -The cholesterol looks OK.    -Your hemoglobin A1c test which averages your blood sugars over the last 3 months suggest mild prediabetes.      -Your complete blood counts including your hemoglobin returned normal for you.         Bottom line: The labs look okay except for the blood sugar.  Watch carbohydrates in your diet to improve this.  This means cutting down on starches and sugars.  Increasing physical activity could also help this.  This should be rechecked in 1 year.        Sincerely,    Dr. Gupta

## 2025-01-06 DIAGNOSIS — I10 BENIGN ESSENTIAL HYPERTENSION: ICD-10-CM

## 2025-01-06 RX ORDER — DILTIAZEM HYDROCHLORIDE 180 MG/1
CAPSULE, EXTENDED RELEASE ORAL
Qty: 90 CAPSULE | Refills: 0 | Status: SHIPPED | OUTPATIENT
Start: 2025-01-06

## 2025-01-14 ENCOUNTER — VIRTUAL VISIT (OUTPATIENT)
Dept: PSYCHOLOGY | Facility: CLINIC | Age: 85
End: 2025-01-14
Payer: COMMERCIAL

## 2025-01-14 DIAGNOSIS — F43.23 ADJUSTMENT DISORDER WITH MIXED ANXIETY AND DEPRESSED MOOD: Primary | ICD-10-CM

## 2025-01-14 PROCEDURE — 90837 PSYTX W PT 60 MINUTES: CPT | Mod: 95

## 2025-01-14 NOTE — PROGRESS NOTES
[facility]   Mental Health & Addiction Services     Progress Note - Initial Visit    Patient  Name:  Carolee Doherty Date: 1/14/25         Service Type: Individual     Visit Start Time: 11:00 am  Visit End Time: 11:53 am    Visit #: 1    Attendees: Client attended alone    Service Modality:  Video Visit:      Provider verified identity through the following two step process.  Patient provided:  Patient address    Telemedicine Visit: The patient's condition can be safely assessed and treated via synchronous audio and visual telemedicine encounter.      Reason for Telemedicine Visit: Patient convenience (e.g. access to timely appointments / distance to available provider)    Originating Site (Patient Location): Patient's home    Distant Site (Provider Location): Provider Remote Setting- Home Office    Consent:  The patient/guardian has verbally consented to: the potential risks and benefits of telemedicine (video visit) versus in person care; bill my insurance or make self-payment for services provided; and responsibility for payment of non-covered services.     Patient would like the video invitation sent by:  My Chart    Mode of Communication:  Video Conference via [employer]    Distant Location (Provider):  Off-site    As the provider I attest to compliance with applicable laws and regulations related to telemedicine.       DATA:   Interactive Complexity: No   Crisis: No     Presenting Concerns/  Current Stressors:   Stress induced vertigo. Major losses over the last few years. Taking Sertraline (experiencing improved sleep but inconsistently), interested in talk therapy. Has started eating in senior living dining room/being social with community, exercise classes, meditation, using a walker. Sees a younger sister every week or so, phone calls with childhood friends, other social contacts through [employer] for coffee.      ASSESSMENT:  Mental Status Assessment:  Appearance:   Appropriate   Eye  Contact:   Good   Psychomotor Behavior: Normal   Attitude:   Cooperative   Orientation:   All  Speech   Rate / Production: Normal/ Responsive   Volume:  Normal   Mood:    Depressed  Normal  Affect:    Appropriate   Thought Content:  Clear   Thought Form:  Coherent   Insight:    Good     Assessments completed prior to this visit:  The following assessments were completed by patient for this visit:  PHQ2:       11/6/2024    10:17 AM 7/31/2024    10:18 AM 11/1/2023    10:07 AM 5/31/2023     3:09 PM 4/27/2022     1:08 PM 3/3/2022     2:03 PM 3/3/2022     1:55 PM   PHQ-2 ( 1999 [employer])   Q1: Little interest or pleasure in doing things 0 0 0 0  0  0 0    Q2: Feeling down, depressed or hopeless 0 0 0 0  0  0 0    PHQ-2 Score 0 0 0 0 0 0 0   Q1: Little interest or pleasure in doing things  Not at all Not at all Not at all Not at all  Not at all   Q2: Feeling down, depressed or hopeless  Not at all Not at all Not at all Not at all  Not at all   PHQ-2 Score  0 0 0 0  0       Proxy-reported     PHQ9:        No data to display              GAD2:        No data to display              GAD7:       8/1/2023    11:24 AM   JUAN-7 SCORE   Total Score 4         Safety Issues and Plan for Safety and Risk Management:   [place] Suicide Severity Rating Scale (Lifetime/Recent)       No data to display              Patient denies current fears or concerns for personal safety.  Patient denies current or recent suicidal ideation or behaviors.  Patient denies current or recent homicidal ideation or behaviors.  Patient denies current or recent self injurious behavior or ideation.  Patient denies other safety concerns.  Recommended that patient call 911 or go to the local ED should there be a change in any of these risk factors  Patient reports there are no firearms in the house.     Diagnostic Criteria:  Adjustment Disorder  A. The development of emotional or behavioral symptoms in response to an identifiable stressor(s) occurring within 3  months of the onset of the stressor(s)  B. These symptoms or behaviors are clinically significant, as evidenced by one or both of the following:       - Significant impairment in social, occupational, or other important areas of functioning  C. The stress-related disturbance does not meet criteria for another disorder & is not not an exacerbation of another mental disorder  D. The symptoms do not represent normal bereavement  E. 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 Provider will continue Diagnostic Assessment.  Patient was given the following to do until next session:  complete intake paperwork    2. Provider recommended the following referrals: none at this time.      3.  Suicide Risk and Safety Concerns were assessed for Carolee Doherty.    Patient meets the following risk assessment and triage: Patient denied any current/recent/lifetime history of suicidal ideation and/or behaviors.  No safety plan indicated at this time.       Joellen Delacruz, Northern [facility] Inland HospitalSW  January 14, 2025

## 2025-04-16 DIAGNOSIS — I10 BENIGN ESSENTIAL HYPERTENSION: ICD-10-CM

## 2025-04-16 RX ORDER — DILTIAZEM HYDROCHLORIDE 180 MG/1
CAPSULE, EXTENDED RELEASE ORAL
Qty: 90 CAPSULE | Refills: 0 | Status: SHIPPED | OUTPATIENT
Start: 2025-04-16

## 2025-04-28 ENCOUNTER — VIRTUAL VISIT (OUTPATIENT)
Dept: FAMILY MEDICINE | Facility: CLINIC | Age: 85
End: 2025-04-28
Payer: COMMERCIAL

## 2025-04-28 DIAGNOSIS — R91.8 PULMONARY NODULES: ICD-10-CM

## 2025-04-28 DIAGNOSIS — I10 BENIGN ESSENTIAL HYPERTENSION: Primary | ICD-10-CM

## 2025-04-28 PROCEDURE — 98014 SYNCH AUDIO-ONLY EST MOD 30: CPT | Performed by: INTERNAL MEDICINE

## 2025-04-28 RX ORDER — DILTIAZEM HYDROCHLORIDE EXTENDED-RELEASE TABLETS 180 MG/1
180 TABLET, EXTENDED RELEASE ORAL DAILY
Qty: 90 TABLET | Refills: 3 | Status: SHIPPED | OUTPATIENT
Start: 2025-04-28

## 2025-04-28 NOTE — PROGRESS NOTES
Carolee is a 84 year old who is being evaluated via a billable telephone visit.    What phone number would you like to be contacted at? [phone]  How would you like to obtain your AVS? Leann  Originating Location (pt. Location): Home    Distant Location (provider location):  On-site  Telephone visit completed due to the patient did not have access to video, while the distant provider did.    Assessment & Plan     Benign essential hypertension  Blood pressure is not well-controlled  Goal blood pressures to be at least less than 140/90 in her case  Previously, her blood pressure was well-controlled on cardia  mg once daily  That being said, because she lacks transportation it is difficult for her to get this formulation of extended release diltiazem because her local pharmacy does not carry the brand  I recommended that she try a different generic brand of extended release diltiazem  After discussion, she was willing to give it a try  I sent a different prescription to her local pharmacy  If she has side effects on the new formulation of extended release diltiazem, she will inform us  Otherwise, she will continue that medication until her routine follow-up exam in the fall 2025    Pulmonary nodule  Also, she does have a pulmonary nodule that needs follow-up in October 2025 and we discussed this.  I ordered the CT scan.  - diltiazem ER (CARDIAZEM LA) 180 MG 24 hr tablet; Take 1 tablet (180 mg) by mouth daily.              Subjective   Carolee is a 84 year old, presenting for the following health issues:  Recheck Medication and Follow Up        4/28/2025     4:26 PM   Additional Questions   Roomed by Janett   Accompanied by Not applicable, by themselves     History of Present Illness       Reason for visit:  F/u med   She is taking medications regularly.              The history is challenging but what I can discern is that she ran out of cardia XT  She had previously specified that she only wanted that brand of  extended release diltiazem to be dispensed  Her local pharmacy does not have that brand of diltiazem in stock  Because her  had been ill, she lacked transportation to go to different pharmacies to get her diltiazem  As such, she tapered herself off of diltiazem   However, upon doing so, she had noted that her blood pressures have become elevated  The majority of her systolic blood pressures are in the 150s to 160s although the diastolic blood pressures are in the 70s to 80s after reviewing multiple home blood pressure readings      Objective    Vitals - Patient Reported  Systolic (Patient Reported): 136  Diastolic (Patient Reported): 72  Weight (Patient Reported): 71 kg (156 lb 9.6 oz)  Pulse (Patient Reported): 103 (doing laundry)      Vitals:  No vitals were obtained today due to virtual visit.    Physical Exam   General: Alert and no distress //Respiratory: No audible wheeze, cough, or shortness of breath // Psychiatric:  Appropriate affect, tone, and pace of words            Phone call duration: 1443 minutes  Signed Electronically by: Kristian Gupta MD

## 2025-06-03 ENCOUNTER — TELEPHONE (OUTPATIENT)
Dept: FAMILY MEDICINE | Facility: CLINIC | Age: 85
End: 2025-06-03
Payer: COMMERCIAL

## 2025-06-03 DIAGNOSIS — I10 BENIGN ESSENTIAL HYPERTENSION: Primary | ICD-10-CM

## 2025-06-03 RX ORDER — DILTIAZEM HYDROCHLORIDE 180 MG/1
180 CAPSULE, COATED, EXTENDED RELEASE ORAL DAILY
Qty: 90 CAPSULE | Refills: 3 | Status: SHIPPED | OUTPATIENT
Start: 2025-06-03 | End: 2025-06-03

## 2025-06-03 RX ORDER — DILTIAZEM HYDROCHLORIDE 180 MG/1
180 CAPSULE, COATED, EXTENDED RELEASE ORAL DAILY
Qty: 90 CAPSULE | Refills: 3 | Status: SHIPPED | OUTPATIENT
Start: 2025-06-03

## 2025-06-03 NOTE — TELEPHONE ENCOUNTER
Called and notified pt of provider message below. Pt expresses understanding and has no further questions or concerns at this time.    Mery Szymanski RN

## 2025-06-03 NOTE — TELEPHONE ENCOUNTER
Pt called about diltiazem. Pt states she would like to have FELY Cardia instead of generic diltiazem. Pended pharmacy.    Please review and order if appropriate.    Renee Dobson RN

## 2025-07-15 DIAGNOSIS — I10 BENIGN ESSENTIAL HYPERTENSION: ICD-10-CM

## 2025-07-15 RX ORDER — DILTIAZEM HYDROCHLORIDE 180 MG/1
180 CAPSULE, COATED, EXTENDED RELEASE ORAL DAILY
Qty: 90 CAPSULE | Refills: 3 | Status: SHIPPED | OUTPATIENT
Start: 2025-07-15

## 2025-07-15 NOTE — TELEPHONE ENCOUNTER
To PCP:    Patient states she tried the generic of the diltiazem for one month and had a poor reaction. She is wanting BRAND NAME ONLY.     Can PCP Please review the pended prescription and ensure it's written correctly?    To RN it looks like it was correct prior, but she got the generic.    Thank you,    Frank Ansari RN  [facility]

## 2025-07-21 ENCOUNTER — NURSE TRIAGE (OUTPATIENT)
Dept: FAMILY MEDICINE | Facility: CLINIC | Age: 85
End: 2025-07-21

## 2025-07-21 ENCOUNTER — APPOINTMENT (OUTPATIENT)
Dept: GENERAL RADIOLOGY | Facility: CLINIC | Age: 85
End: 2025-07-21
Attending: EMERGENCY MEDICINE
Payer: COMMERCIAL

## 2025-07-21 ENCOUNTER — HOSPITAL ENCOUNTER (INPATIENT)
Facility: CLINIC | Age: 85
End: 2025-07-21
Attending: EMERGENCY MEDICINE
Payer: COMMERCIAL

## 2025-07-21 DIAGNOSIS — R09.02 HYPOXIA: ICD-10-CM

## 2025-07-21 DIAGNOSIS — J18.9 COMMUNITY ACQUIRED PNEUMONIA, UNSPECIFIED LATERALITY: ICD-10-CM

## 2025-07-21 LAB
ANION GAP SERPL CALCULATED.3IONS-SCNC: 12 MMOL/L (ref 7–15)
BASOPHILS # BLD AUTO: 0 10E3/UL (ref 0–0.2)
BASOPHILS NFR BLD AUTO: 0 %
BUN SERPL-MCNC: 15.7 MG/DL (ref 8–23)
CALCIUM SERPL-MCNC: 9.5 MG/DL (ref 8.8–10.4)
CHLORIDE SERPL-SCNC: 101 MMOL/L (ref 98–107)
CREAT SERPL-MCNC: 0.69 MG/DL (ref 0.51–0.95)
EGFRCR SERPLBLD CKD-EPI 2021: 86 ML/MIN/1.73M2
EOSINOPHIL # BLD AUTO: 0 10E3/UL (ref 0–0.7)
EOSINOPHIL NFR BLD AUTO: 0 %
ERYTHROCYTE [DISTWIDTH] IN BLOOD BY AUTOMATED COUNT: 14.1 % (ref 10–15)
FLUAV RNA SPEC QL NAA+PROBE: NEGATIVE
FLUBV RNA RESP QL NAA+PROBE: NEGATIVE
GLUCOSE SERPL-MCNC: 119 MG/DL (ref 70–99)
HCO3 SERPL-SCNC: 23 MMOL/L (ref 22–29)
HCT VFR BLD AUTO: 40.7 % (ref 35–47)
HGB BLD-MCNC: 14.3 G/DL (ref 11.7–15.7)
IMM GRANULOCYTES # BLD: 0 10E3/UL
IMM GRANULOCYTES NFR BLD: 0 %
LYMPHOCYTES # BLD AUTO: 1.1 10E3/UL (ref 0.8–5.3)
LYMPHOCYTES NFR BLD AUTO: 13 %
MCH RBC QN AUTO: 30.3 PG (ref 26.5–33)
MCHC RBC AUTO-ENTMCNC: 35.1 G/DL (ref 31.5–36.5)
MCV RBC AUTO: 86 FL (ref 78–100)
MONOCYTES # BLD AUTO: 0.7 10E3/UL (ref 0–1.3)
MONOCYTES NFR BLD AUTO: 8 %
NEUTROPHILS # BLD AUTO: 6.6 10E3/UL (ref 1.6–8.3)
NEUTROPHILS NFR BLD AUTO: 79 %
NRBC # BLD AUTO: 0 10E3/UL
NRBC BLD AUTO-RTO: 0 /100
PLATELET # BLD AUTO: 172 10E3/UL (ref 150–450)
POTASSIUM SERPL-SCNC: 3.5 MMOL/L (ref 3.4–5.3)
RBC # BLD AUTO: 4.72 10E6/UL (ref 3.8–5.2)
RSV RNA SPEC NAA+PROBE: NEGATIVE
SARS-COV-2 RNA RESP QL NAA+PROBE: NEGATIVE
SODIUM SERPL-SCNC: 136 MMOL/L (ref 135–145)
WBC # BLD AUTO: 8.4 10E3/UL (ref 4–11)

## 2025-07-21 PROCEDURE — 71046 X-RAY EXAM CHEST 2 VIEWS: CPT

## 2025-07-21 PROCEDURE — 87637 SARSCOV2&INF A&B&RSV AMP PRB: CPT | Performed by: EMERGENCY MEDICINE

## 2025-07-21 PROCEDURE — 80048 BASIC METABOLIC PNL TOTAL CA: CPT | Performed by: EMERGENCY MEDICINE

## 2025-07-21 PROCEDURE — 84145 PROCALCITONIN (PCT): CPT | Performed by: INTERNAL MEDICINE

## 2025-07-21 PROCEDURE — 99285 EMERGENCY DEPT VISIT HI MDM: CPT | Mod: 25

## 2025-07-21 PROCEDURE — 250N000011 HC RX IP 250 OP 636: Performed by: EMERGENCY MEDICINE

## 2025-07-21 PROCEDURE — 250N000013 HC RX MED GY IP 250 OP 250 PS 637: Performed by: EMERGENCY MEDICINE

## 2025-07-21 PROCEDURE — 85004 AUTOMATED DIFF WBC COUNT: CPT | Performed by: EMERGENCY MEDICINE

## 2025-07-21 PROCEDURE — 36415 COLL VENOUS BLD VENIPUNCTURE: CPT | Performed by: EMERGENCY MEDICINE

## 2025-07-21 PROCEDURE — 86140 C-REACTIVE PROTEIN: CPT | Performed by: INTERNAL MEDICINE

## 2025-07-21 PROCEDURE — 87040 BLOOD CULTURE FOR BACTERIA: CPT | Performed by: INTERNAL MEDICINE

## 2025-07-21 PROCEDURE — 120N000001 HC R&B MED SURG/OB

## 2025-07-21 PROCEDURE — 99222 1ST HOSP IP/OBS MODERATE 55: CPT | Performed by: INTERNAL MEDICINE

## 2025-07-21 RX ORDER — CEFTRIAXONE 2 G/1
2 INJECTION, POWDER, FOR SOLUTION INTRAMUSCULAR; INTRAVENOUS ONCE
Status: COMPLETED | OUTPATIENT
Start: 2025-07-21 | End: 2025-07-22

## 2025-07-21 RX ORDER — DOXYCYCLINE 100 MG/1
100 CAPSULE ORAL ONCE
Status: COMPLETED | OUTPATIENT
Start: 2025-07-21 | End: 2025-07-21

## 2025-07-21 RX ADMIN — CEFTRIAXONE SODIUM 2 G: 2 INJECTION, POWDER, FOR SOLUTION INTRAMUSCULAR; INTRAVENOUS at 23:41

## 2025-07-21 RX ADMIN — DOXYCYCLINE HYCLATE 100 MG: 100 CAPSULE ORAL at 23:41

## 2025-07-21 ASSESSMENT — COLUMBIA-SUICIDE SEVERITY RATING SCALE - C-SSRS
2. HAVE YOU ACTUALLY HAD ANY THOUGHTS OF KILLING YOURSELF IN THE PAST MONTH?: NO
6. HAVE YOU EVER DONE ANYTHING, STARTED TO DO ANYTHING, OR PREPARED TO DO ANYTHING TO END YOUR LIFE?: NO
1. IN THE PAST MONTH, HAVE YOU WISHED YOU WERE DEAD OR WISHED YOU COULD GO TO SLEEP AND NOT WAKE UP?: NO

## 2025-07-21 ASSESSMENT — ACTIVITIES OF DAILY LIVING (ADL)
ADLS_ACUITY_SCORE: 41

## 2025-07-22 ENCOUNTER — APPOINTMENT (OUTPATIENT)
Dept: CT IMAGING | Facility: CLINIC | Age: 85
End: 2025-07-22
Attending: INTERNAL MEDICINE
Payer: COMMERCIAL

## 2025-07-22 LAB
CRP SERPL-MCNC: 15.61 MG/L
L PNEUMO1 AG UR QL IA: NEGATIVE
PROCALCITONIN SERPL IA-MCNC: 0.07 NG/ML
S PNEUM AG SPEC QL: NEGATIVE
SPECIMEN TYPE: NORMAL

## 2025-07-22 PROCEDURE — 250N000011 HC RX IP 250 OP 636: Performed by: INTERNAL MEDICINE

## 2025-07-22 PROCEDURE — 250N000009 HC RX 250: Performed by: INTERNAL MEDICINE

## 2025-07-22 PROCEDURE — 999N000157 HC STATISTIC RCP TIME EA 10 MIN

## 2025-07-22 PROCEDURE — 94640 AIRWAY INHALATION TREATMENT: CPT

## 2025-07-22 PROCEDURE — 250N000013 HC RX MED GY IP 250 OP 250 PS 637: Performed by: INTERNAL MEDICINE

## 2025-07-22 PROCEDURE — 71275 CT ANGIOGRAPHY CHEST: CPT

## 2025-07-22 PROCEDURE — 99233 SBSQ HOSP IP/OBS HIGH 50: CPT | Performed by: INTERNAL MEDICINE

## 2025-07-22 PROCEDURE — 120N000001 HC R&B MED SURG/OB

## 2025-07-22 PROCEDURE — 87899 AGENT NOS ASSAY W/OPTIC: CPT | Performed by: INTERNAL MEDICINE

## 2025-07-22 RX ORDER — HYDRALAZINE HYDROCHLORIDE 20 MG/ML
10 INJECTION INTRAMUSCULAR; INTRAVENOUS EVERY 4 HOURS PRN
Status: ACTIVE | OUTPATIENT
Start: 2025-07-22

## 2025-07-22 RX ORDER — PROCHLORPERAZINE MALEATE 5 MG/1
5 TABLET ORAL EVERY 6 HOURS PRN
Status: ACTIVE | OUTPATIENT
Start: 2025-07-22

## 2025-07-22 RX ORDER — SERTRALINE HCL 25 MG
12.5 TABLET ORAL DAILY
Status: DISPENSED | OUTPATIENT
Start: 2025-07-22

## 2025-07-22 RX ORDER — ACETAMINOPHEN 325 MG/1
325-650 TABLET ORAL EVERY 6 HOURS PRN
Status: ON HOLD | COMMUNITY

## 2025-07-22 RX ORDER — ONDANSETRON 2 MG/ML
4 INJECTION INTRAMUSCULAR; INTRAVENOUS EVERY 6 HOURS PRN
Status: ACTIVE | OUTPATIENT
Start: 2025-07-22

## 2025-07-22 RX ORDER — AMOXICILLIN 250 MG
2 CAPSULE ORAL 2 TIMES DAILY PRN
Status: ACTIVE | OUTPATIENT
Start: 2025-07-22

## 2025-07-22 RX ORDER — BISACODYL 10 MG
10 SUPPOSITORY, RECTAL RECTAL DAILY PRN
Status: ACTIVE | OUTPATIENT
Start: 2025-07-22

## 2025-07-22 RX ORDER — PANTOPRAZOLE SODIUM 40 MG/1
40 TABLET, DELAYED RELEASE ORAL
Status: DISPENSED | OUTPATIENT
Start: 2025-07-22

## 2025-07-22 RX ORDER — METHYLPREDNISOLONE SODIUM SUCCINATE 125 MG/2ML
60 INJECTION INTRAMUSCULAR; INTRAVENOUS EVERY 24 HOURS
Status: DISCONTINUED | OUTPATIENT
Start: 2025-07-22 | End: 2025-07-23

## 2025-07-22 RX ORDER — SALIVA STIMULANT COMB. NO.3
2 SPRAY, NON-AEROSOL (ML) MUCOUS MEMBRANE
Status: ACTIVE | OUTPATIENT
Start: 2025-07-22

## 2025-07-22 RX ORDER — GUAIFENESIN 200 MG/10ML
200 LIQUID ORAL EVERY 4 HOURS PRN
Status: ACTIVE | OUTPATIENT
Start: 2025-07-22

## 2025-07-22 RX ORDER — ONDANSETRON 4 MG/1
4 TABLET, ORALLY DISINTEGRATING ORAL EVERY 6 HOURS PRN
Status: ACTIVE | OUTPATIENT
Start: 2025-07-22

## 2025-07-22 RX ORDER — POLYETHYLENE GLYCOL 3350 17 G/17G
17 POWDER, FOR SOLUTION ORAL 2 TIMES DAILY PRN
Status: ACTIVE | OUTPATIENT
Start: 2025-07-22

## 2025-07-22 RX ORDER — NALOXONE HYDROCHLORIDE 0.4 MG/ML
0.4 INJECTION, SOLUTION INTRAMUSCULAR; INTRAVENOUS; SUBCUTANEOUS
Status: ACTIVE | OUTPATIENT
Start: 2025-07-22

## 2025-07-22 RX ORDER — AMOXICILLIN 250 MG
1 CAPSULE ORAL 2 TIMES DAILY PRN
Status: ACTIVE | OUTPATIENT
Start: 2025-07-22

## 2025-07-22 RX ORDER — OXYCODONE HYDROCHLORIDE 5 MG/1
5 TABLET ORAL EVERY 4 HOURS PRN
Status: ACTIVE | OUTPATIENT
Start: 2025-07-22

## 2025-07-22 RX ORDER — CEFTRIAXONE 2 G/1
2 INJECTION, POWDER, FOR SOLUTION INTRAMUSCULAR; INTRAVENOUS EVERY 24 HOURS
Status: DISPENSED | OUTPATIENT
Start: 2025-07-22 | End: 2025-07-29

## 2025-07-22 RX ORDER — HYDRALAZINE HYDROCHLORIDE 10 MG/1
10 TABLET, FILM COATED ORAL EVERY 4 HOURS PRN
Status: ACTIVE | OUTPATIENT
Start: 2025-07-22

## 2025-07-22 RX ORDER — IPRATROPIUM BROMIDE AND ALBUTEROL SULFATE 2.5; .5 MG/3ML; MG/3ML
3 SOLUTION RESPIRATORY (INHALATION)
Status: DISPENSED | OUTPATIENT
Start: 2025-07-22

## 2025-07-22 RX ORDER — ACETAMINOPHEN 325 MG/1
650 TABLET ORAL EVERY 4 HOURS PRN
Status: DISPENSED | OUTPATIENT
Start: 2025-07-22

## 2025-07-22 RX ORDER — IBUPROFEN 400 MG/1
400 TABLET, FILM COATED ORAL ONCE
Status: COMPLETED | OUTPATIENT
Start: 2025-07-22 | End: 2025-07-22

## 2025-07-22 RX ORDER — IBUPROFEN 400 MG/1
400 TABLET, FILM COATED ORAL EVERY 6 HOURS PRN
Status: ACTIVE | OUTPATIENT
Start: 2025-07-22

## 2025-07-22 RX ORDER — MULTIPLE VITAMINS W/ MINERALS TAB 9MG-400MCG
1 TAB ORAL DAILY
Status: ON HOLD | COMMUNITY

## 2025-07-22 RX ORDER — IOPAMIDOL 755 MG/ML
72 INJECTION, SOLUTION INTRAVASCULAR ONCE
Status: COMPLETED | OUTPATIENT
Start: 2025-07-22 | End: 2025-07-22

## 2025-07-22 RX ORDER — ACETAMINOPHEN 650 MG/1
650 SUPPOSITORY RECTAL EVERY 4 HOURS PRN
Status: ACTIVE | OUTPATIENT
Start: 2025-07-22

## 2025-07-22 RX ORDER — ALBUTEROL SULFATE 0.83 MG/ML
2.5 SOLUTION RESPIRATORY (INHALATION)
Status: ACTIVE | OUTPATIENT
Start: 2025-07-22

## 2025-07-22 RX ORDER — DOXYCYCLINE 100 MG/10ML
100 INJECTION, POWDER, LYOPHILIZED, FOR SOLUTION INTRAVENOUS EVERY 12 HOURS
Status: DISCONTINUED | OUTPATIENT
Start: 2025-07-22 | End: 2025-07-23

## 2025-07-22 RX ORDER — NALOXONE HYDROCHLORIDE 0.4 MG/ML
0.2 INJECTION, SOLUTION INTRAMUSCULAR; INTRAVENOUS; SUBCUTANEOUS
Status: ACTIVE | OUTPATIENT
Start: 2025-07-22

## 2025-07-22 RX ORDER — BENZONATATE 100 MG/1
100 CAPSULE ORAL 3 TIMES DAILY PRN
Status: ACTIVE | OUTPATIENT
Start: 2025-07-22

## 2025-07-22 RX ORDER — DILTIAZEM HYDROCHLORIDE 180 MG/1
180 CAPSULE, COATED, EXTENDED RELEASE ORAL DAILY
Status: DISPENSED | OUTPATIENT
Start: 2025-07-22

## 2025-07-22 RX ORDER — LIDOCAINE 40 MG/G
CREAM TOPICAL
Status: ACTIVE | OUTPATIENT
Start: 2025-07-22

## 2025-07-22 RX ORDER — ENOXAPARIN SODIUM 100 MG/ML
40 INJECTION SUBCUTANEOUS EVERY 24 HOURS
Status: DISPENSED | OUTPATIENT
Start: 2025-07-22

## 2025-07-22 RX ADMIN — IBUPROFEN 400 MG: 400 TABLET, FILM COATED ORAL at 08:49

## 2025-07-22 RX ADMIN — ENOXAPARIN SODIUM 40 MG: 40 INJECTION SUBCUTANEOUS at 10:45

## 2025-07-22 RX ADMIN — DILTIAZEM HYDROCHLORIDE 180 MG: 180 CAPSULE, COATED, EXTENDED RELEASE ORAL at 08:48

## 2025-07-22 RX ADMIN — DOXYCYCLINE 100 MG: 100 INJECTION, POWDER, LYOPHILIZED, FOR SOLUTION INTRAVENOUS at 23:13

## 2025-07-22 RX ADMIN — PANTOPRAZOLE SODIUM 40 MG: 40 TABLET, DELAYED RELEASE ORAL at 13:17

## 2025-07-22 RX ADMIN — SERTRALINE HYDROCHLORIDE 12.5 MG: 25 TABLET ORAL at 08:49

## 2025-07-22 RX ADMIN — IOPAMIDOL 72 ML: 755 INJECTION, SOLUTION INTRAVENOUS at 22:18

## 2025-07-22 RX ADMIN — ACETAMINOPHEN 650 MG: 325 TABLET ORAL at 07:46

## 2025-07-22 RX ADMIN — CEFTRIAXONE SODIUM 2 G: 2 INJECTION, POWDER, FOR SOLUTION INTRAMUSCULAR; INTRAVENOUS at 22:51

## 2025-07-22 RX ADMIN — DOXYCYCLINE 100 MG: 100 INJECTION, POWDER, LYOPHILIZED, FOR SOLUTION INTRAVENOUS at 11:25

## 2025-07-22 RX ADMIN — METHYLPREDNISOLONE SODIUM SUCCINATE 62.5 MG: 125 INJECTION, POWDER, FOR SOLUTION INTRAMUSCULAR; INTRAVENOUS at 13:17

## 2025-07-22 RX ADMIN — IPRATROPIUM BROMIDE AND ALBUTEROL SULFATE 3 ML: .5; 3 SOLUTION RESPIRATORY (INHALATION) at 19:16

## 2025-07-22 RX ADMIN — ACETAMINOPHEN 650 MG: 325 TABLET ORAL at 23:10

## 2025-07-22 RX ADMIN — SODIUM CHLORIDE 80 ML: 9 INJECTION, SOLUTION INTRAVENOUS at 22:19

## 2025-07-22 ASSESSMENT — ACTIVITIES OF DAILY LIVING (ADL)
ADLS_ACUITY_SCORE: 23
ADLS_ACUITY_SCORE: 29
ADLS_ACUITY_SCORE: 41
ADLS_ACUITY_SCORE: 29
ADLS_ACUITY_SCORE: 23
ADLS_ACUITY_SCORE: 29
ADLS_ACUITY_SCORE: 41
ADLS_ACUITY_SCORE: 23
ADLS_ACUITY_SCORE: 39
ADLS_ACUITY_SCORE: 29
ADLS_ACUITY_SCORE: 41
ADLS_ACUITY_SCORE: 23
ADLS_ACUITY_SCORE: 29
ADLS_ACUITY_SCORE: 41
ADLS_ACUITY_SCORE: 41
ADLS_ACUITY_SCORE: 29
ADLS_ACUITY_SCORE: 29
ADLS_ACUITY_SCORE: 39
ADLS_ACUITY_SCORE: 29

## 2025-07-22 NOTE — PROGRESS NOTES
[facility]    Medicine Progress Note - Hospitalist Service    Date of Admission:  7/21/2025    Assessment & Plan     Carolee Doherty is a 84 year old female with a history of bronchiectasis, hypertension, pulmonary fibrosis admitted on 7/21/2025. She presents to the emergency department for evaluation of 5 days increasing cough now with fever of 101 and is found to be hypoxic to 87% at rest on room air.     Sepsis, secondary to bilateral lower lobe pneumonia, community-acquired  Acute hypoxic respiratory failure secondary to bilateral lung pneumonia;  tachycardic at 101, tachypnea >20, hypoxia at 87% at rest, fever of 101.  5 days of increasing cough now with fevers and chills, measured hypoxia.  May have been precipitated by a viral illness as she believes her daughter was visiting and had a respiratory illness last week.   RSV, influenza, COVID-19 testing returned negative  Continue ceftriaxone and doxycycline  Blood cultures x 2 are pending  Legionella and strep urinary antigens are pending  Use as needed Tylenol for fever and added ibuprofen as well as needed for fever  Patient with coarse breath sounds added Solu-Medrol 60 mg daily to help with reactive airway disease in the setting of bilateral lung pneumonia  PPI addition while on steroids  Albuterol nebulizer as needed for wheezing  Wean off of oxygen as tolerated  Encourage incentive spirometry as tolerated  Pulmonary nodule:  - Due for CT follow-up in October 2025.  Primary care team aware by recent virtual visit.     Hypertension:  -Resume prior to admission cartia  mg daily.  Reports intolerance with generics.     Adjustment disorder with mixed anxiety and depressed mood:  - Continue prior to admission sertraline 12.5 mg daily              Diet: Combination Diet Regular Diet Adult    DVT Prophylaxis: Enoxaparin (Lovenox) SQ  Mena Catheter: Not present  Lines: None     Cardiac Monitoring: None  Code Status: No CPR-  Pre-arrest intubation OK      Clinically Significant Risk Factors Present on Admission                   # Hypertension: Noted on problem list                      Social Drivers of Health    Tobacco Use: Medium Risk (4/28/2025)    Patient History     Smoking Tobacco Use: Former     Smokeless Tobacco Use: Never   Physical Activity: Unknown (11/6/2024)    Exercise Vital Sign     Days of Exercise per Week: 3 days   Social Connections: Unknown (11/6/2024)    Social Connection and Isolation Panel [NHANES]     Frequency of Social Gatherings with Friends and Family: More than three times a week          Disposition Plan     Medically Ready for Discharge: Anticipated in 2-4 Days    Once hypoxia improves and respiratory status is stable     Most likely will discharge back to home    Quyen Hernandez MD  Hospitalist Service  [facility]  Securely message with [employer] (more info)  Text page via [employer] Paging/Directory   ______________________________________________________________________    Interval History   Chart reviewed.  Discussed with bedside RN  Patient resting comfortably in bed.  Had a fever of 101.7 earlier this morning needing as needed Tylenol.  Needing 2 L of oxygen by oxy mask.  Complains of intermittent dry cough.  Complains of mild shortness of breath especially worse with exertion.  No other acute issues    Physical Exam   Vital Signs: Temp: 98.6  F (37  C) Temp src: Oral BP: 133/72 Pulse: 95   Resp: 20 SpO2: 92 % O2 Device: Oxymask Oxygen Delivery: 2 LPM  Weight: 155 lbs 13.84 oz    General Appearance: Pleasant female sitting comfortably in bed alert oriented x 3 not in acute distress  Respiratory: Bilateral coarse breath sounds heard on auscultation with occasional wheezing  Cardiovascular: Normal rate rhythm regular  GI: Soft, nontender nondistended bowel sounds positive  Skin: Warm and dry  Other: No lower extremity edema, calm and cooperative    Medical Decision Making       55  MINUTES SPENT BY ME on the date of service doing chart review, history, exam, documentation & further activities per the note.      Data     I have personally reviewed the following data over the past 24 hrs:    8.4  \   14.3   / 172     136 101 15.7 /  119 (H)   3.5 23 0.69 \       Imaging results reviewed over the past 24 hrs:   Recent Results (from the past 24 hours)   Chest XR,  PA & LAT    Narrative    EXAM: XR CHEST 2 VIEWS  LOCATION: [facility]  DATE: 7/21/2025    INDICATION: Fever and shortness of breath.  COMPARISON: 10/16/2024.      Impression    IMPRESSION: Coarsened bilateral interstitial opacities, increased from previous examination, favoring a progression of fibrosis and bronchiectatic change, though acute interstitial infiltrates are difficult to exclude. No large focal airspace   consolidation.    No pleural effusion or pneumothorax.    Heart size is normal.

## 2025-07-22 NOTE — ED TRIAGE NOTES
Pt presents to triage with  and son with C/O SOB, fever, cough for the last few days. Pt SOB with minimal exertion. Pt 91% in triage room, pt states 94% is her baseline. Pt states she had a fever of 101, took extra strength tylenol at 5:30 pm.     Triage Assessment (Adult)       Row Name 07/21/25 1954          Triage Assessment    Airway WDL WDL        Respiratory WDL    Respiratory WDL X;all     Rhythm/Pattern, Respiratory shortness of breath        Skin Circulation/Temperature WDL    Skin Circulation/Temperature WDL WDL        Cardiac WDL    Cardiac WDL WDL        Peripheral/Neurovascular WDL    Peripheral Neurovascular WDL WDL        Cognitive/Neuro/Behavioral WDL    Cognitive/Neuro/Behavioral WDL WDL

## 2025-07-22 NOTE — PROGRESS NOTES
RECEIVING UNIT ED HANDOFF REVIEW    ED Nurse Handoff Report was reviewed by: Doreen Sparks RN on July 22, 2025 at 4:20 AM

## 2025-07-22 NOTE — PROGRESS NOTES
.Date/Time 7/22/25 [id number]  Diagnosis:Community Acquired Pneumonia  POD#:na  Mental Status:A&OX4  Activity/dangle: A1& walker   Diet:Reg  Pain:Tylenol   Mena/Voiding:BR  Tele/Restraints/Iso: NA  02/LDA: 3L NC, intermittent Abx  D/C Date:pending   Other Info: VSS on 3L NC, encouraged IS, drink water.

## 2025-07-22 NOTE — ED PROVIDER NOTES
Emergency Department Note      History of Present Illness     Chief Complaint   Cough    HPI   Carolee Doherty is a 84 year old female with history of hypertension and hyperlipidemia who presents to the ED with her  and son for evaluation of a cough. The patient reports that she has had a cough for the past 5 days. She states that she becomes dyspneic only while coughing. She reports that she has been increasingly fatigued for the past 5 days. Patient has noticed slight leg swelling as well. This morning, the patient developed a fever of 101. Denies abdominal pain. Denies history of cardiovascular disease. No history of smoking. No history of diabetes. Denies recent travel or recent antibiotic use.  Patient notes that she has a history of pneumonia.    Independent Historian   None    Review of External Notes   N/a    Past Medical History     Medical History and Problem List   Hyperlipidemia  Hypertension    Medications   Diltiazem  Sertraline    Surgical History   The patient has no pertinent past surgical history.     Physical Exam     Patient Vitals for the past 24 hrs:   BP Temp Temp src Pulse Resp SpO2   07/21/25 2245 -- -- -- 93 (!) 43 (!) 88 %   07/21/25 2238 -- -- -- -- -- (!) 87 %   07/21/25 1951 (!) 145/66 98.9  F (37.2  C) Temporal 101 24 (!) 91 %     Physical Exam  General: Alert and cooperative with exam. Patient in mild distress. Normal mentation.  Head:  Scalp is NC/AT  Eyes:  No scleral icterus, PERRL  ENT:  The external nose and ears are normal. The oropharynx is normal and without erythema; mucus membranes are moist. Uvula midline, no evidence of deep space infection.  Neck:  Normal range of motion without rigidity.  CV:  Regular rate and rhythm    No pathologic murmur   Resp:  Crackles at the left lung base    Non-labored, no retractions or accessory muscle use  GI:  Abdomen is soft, no distension, no tenderness. No peritoneal signs  MS:  No lower extremity edema   Skin:  Warm and dry, No  rash or lesions noted.  Neuro: Oriented x 3. No gross motor deficits.     Diagnostics     Lab Results   Labs Ordered and Resulted from Time of ED Arrival to Time of ED Departure   BASIC METABOLIC PANEL - Abnormal       Result Value    Sodium 136      Potassium 3.5      Chloride 101      Carbon Dioxide (CO2) 23      Anion Gap 12      Urea Nitrogen 15.7      Creatinine 0.69      GFR Estimate 86      Calcium 9.5      Glucose 119 (*)    INFLUENZA A/B, RSV AND SARS-COV2 PCR - Normal    Influenza A PCR Negative      Influenza B PCR Negative      RSV PCR Negative      SARS CoV2 PCR Negative     CBC WITH PLATELETS AND DIFFERENTIAL    WBC Count 8.4      RBC Count 4.72      Hemoglobin 14.3      Hematocrit 40.7      MCV 86      MCH 30.3      MCHC 35.1      RDW 14.1      Platelet Count 172      % Neutrophils 79      % Lymphocytes 13      % Monocytes 8      % Eosinophils 0      % Basophils 0      % Immature Granulocytes 0      NRBCs per 100 WBC 0      Absolute Neutrophils 6.6      Absolute Lymphocytes 1.1      Absolute Monocytes 0.7      Absolute Eosinophils 0.0      Absolute Basophils 0.0      Absolute Immature Granulocytes 0.0      Absolute NRBCs 0.0     BLOOD CULTURE   BLOOD CULTURE   LEGIONELLA URINARY ANTIGEN AND STREPTOCOCCUS PNEUMONIAE ANTIGEN       Imaging   Chest XR,  PA & LAT   Final Result   IMPRESSION: Coarsened bilateral interstitial opacities, increased from previous examination, favoring a progression of fibrosis and bronchiectatic change, though acute interstitial infiltrates are difficult to exclude. No large focal airspace    consolidation.      No pleural effusion or pneumothorax.      Heart size is normal.          EKG   None    Independent Interpretation   CXR: Bilateral lower lobe infiltrate. No pneumothorax.    ED Course      Medications Administered   Medications   hydrALAZINE (APRESOLINE) tablet 10 mg (has no administration in time range)     Or   hydrALAZINE (APRESOLINE) injection 10 mg (has no  administration in time range)   acetaminophen (TYLENOL) tablet 650 mg (has no administration in time range)     Or   acetaminophen (TYLENOL) Suppository 650 mg (has no administration in time range)   oxyCODONE IR (ROXICODONE) half-tab 2.5 mg (has no administration in time range)   oxyCODONE (ROXICODONE) tablet 5 mg (has no administration in time range)   prochlorperazine (COMPAZINE) injection 5 mg (has no administration in time range)     Or   prochlorperazine (COMPAZINE) tablet 5 mg (has no administration in time range)   ondansetron (ZOFRAN ODT) ODT tab 4 mg (has no administration in time range)     Or   ondansetron (ZOFRAN) injection 4 mg (has no administration in time range)   guaiFENesin (ROBITUSSIN) 20 mg/mL solution 200 mg (has no administration in time range)   albuterol (PROVENTIL) neb solution 2.5 mg (has no administration in time range)   benzonatate (TESSALON) capsule 100 mg (has no administration in time range)   cefTRIAXone (ROCEPHIN) 2 g vial to attach to  ml bag for ADULTS or NS 50 ml bag for PEDS (0 g Intravenous Stopped 7/22/25 0019)   doxycycline hyclate (VIBRAMYCIN) capsule 100 mg (100 mg Oral $Given 7/21/25 2340)       Procedures   None     Discussion of Management   Admitting Hospitalist, Dr. Peters    ED Course   ED Course as of 07/22/25 0139   Mon Jul 21, 2025   5331 I obtained history and examined the patient as noted above.    8932 I spoke with the admitting hospitalist, Dr. Peters, who accepted the patient for admission.       Additional Documentation  None    Medical Decision Making / Diagnosis     CMS Diagnoses: None    MIPS   None    MDM    Carolee Doherty is a 84 year old female who presents for evaluation of a cough and fever.  History, physical exam and imaging studies are consistent with pneumonia.   Labs without significant acute findings as above.  This seems to be community acquired pneumonia and there are no risk factors at this point for coverage of antibiotic-resistant  strains.  I doubt PE, dissection, acute coronary syndrome, or other worrisome etiology for patients symptoms.  Patient was noted to be mildly hypoxic requiring 2 L nasal cannula oxygen.  Blood cultures obtained prior to antibiotic administration.  Initiated on ceftriaxone and doxycycline.  Admitted to hospitalist service.  Stable at time of admission.    Disposition   The patient was admitted to the hospital.     Diagnosis     ICD-10-CM    1. Community acquired pneumonia, unspecified laterality  J18.9       2. Hypoxia  R09.02            Scribe Disclosure:  Дмитрий FOREMAN, am serving as a scribe at 10:04 PM on 7/21/2025 to document services personally performed by Harsh Hudson DO based on my observations and the provider's statements to me.        Harsh Hudson DO  07/22/25 0141

## 2025-07-22 NOTE — PLAN OF CARE
Date/Time 7/22/25  [id number]    Trauma/Ortho/Medical (Choose one) Medical    Diagnosis: Comm. Acquired pneumonia, Hypoxia  Mental Status: A&Ox4  Activity/dangle Ast x1-2, gb/walker, brought walker from home  Diet: Regular  Pain: Denies  Mena/Voiding: BR  Tele/Restraints/Iso: NA  02/LDA: 3L oxymask, SL  D/C Date: Pending  Other Info: SOB upon activity, emesis x1, febrile during shift.

## 2025-07-22 NOTE — PROGRESS NOTES
MD Notification    Notified Person: MD    Notified Person Name: Quyen Hernandez    Notification Date/Time: 07/22/25   0805    Notification Interaction: Mal    Purpose of Notification: FYI: pt temp 101.7 @ 0451, trending down to 101 @ 0700 then 100.6 @ 0734,0746. PRN tylenol given.     Orders Received: Okay to give tylenol as needed for fever, fever expected in the setting of pneumonia.     Comments:

## 2025-07-22 NOTE — ED NOTES
[facility]  ED Nurse Handoff Report    ED Chief complaint: Shortness of Breath and Fever      ED Diagnosis:   Final diagnoses:   Community acquired pneumonia, unspecified laterality   Hypoxia       Code Status: To be addressed by admitting team    Allergies:   Allergies   Allergen Reactions    Amlodipine Besylate Swelling    Levaquin      Strange thoughts    Septra Ds [Sulfamethoxazole W-Trimethoprim]      Ebro lousy       Patient Story: Pt presents to triage with  and son with C/O SOB, fever, cough for the last few days. Pt SOB with minimal exertion. Pt 91% in triage room, pt states 94% is her baseline. Pt states she had a fever of 101, took extra strength tylenol at 5:30 pm.     Triage Assessment (Adult)       Row Name 07/21/25 1954          Triage Assessment    Airway WDL WDL        Respiratory WDL    Respiratory WDL X;all     Rhythm/Pattern, Respiratory shortness of breath        Skin Circulation/Temperature WDL    Skin Circulation/Temperature WDL WDL        Cardiac WDL    Cardiac WDL WDL        Peripheral/Neurovascular WDL    Peripheral Neurovascular WDL WDL        Cognitive/Neuro/Behavioral WDL    Cognitive/Neuro/Behavioral WDL WDL                   Focused Assessment: Vs monitoring. Patient on oxymask instead of NC d/t being a mouth breather  Abnormal Labs Resulted from Time of ED Arrival to Time of ED Departure   BASIC METABOLIC PANEL - Abnormal       Result Value    Sodium 136      Potassium 3.5      Chloride 101      Carbon Dioxide (CO2) 23      Anion Gap 12      Urea Nitrogen 15.7      Creatinine 0.69      GFR Estimate 86      Calcium 9.5      Glucose 119 (*)        Chest XR,  PA & LAT   Final Result   IMPRESSION: Coarsened bilateral interstitial opacities, increased from previous examination, favoring a progression of fibrosis and bronchiectatic change, though acute interstitial infiltrates are difficult to exclude. No large focal airspace    consolidation.      No pleural  effusion or pneumothorax.      Heart size is normal.          Treatments and/or interventions provided:   Medications   cefTRIAXone (ROCEPHIN) 2 g vial to attach to  ml bag for ADULTS or NS 50 ml bag for PEDS (2 g Intravenous $New Bag 7/21/25 2341)   doxycycline hyclate (VIBRAMYCIN) capsule 100 mg (100 mg Oral $Given 7/21/25 2341)       Patient's response to treatments and/or interventions: Unchanged    To be done/followed up on inpatient unit:  Follow Plan of Care    Does this patient have any cognitive concerns?: A&Ox4    Activity level - Baseline/Home:  Independent with walker  Activity Level - Current:   Stand with Assist with walker    Patient's Preferred language: English   Needed?: No    Isolation: None  Infection: Not Applicable  Sepsis treatment initiated: No  Patient tested for COVID 19 prior to admission: YES  Bariatric?: No    Vital Signs:   Vitals:    07/21/25 1951 07/21/25 2238 07/21/25 2245   BP: (!) 145/66     Pulse: 101  93   Resp: 24  (!) 43   Temp: 98.9  F (37.2  C)     TempSrc: Temporal     SpO2: (!) 91% (!) 87% (!) 88%       Cardiac Rhythm:     Family Comments: Family went home  OBS brochure/video discussed/provided to patient/family: Yes    For the majority of the shift this patient's behavior was Green.   Behavioral interventions performed were None.    ED NURSE PHONE NUMBER: *[phone]

## 2025-07-22 NOTE — H&P
[facility]    History and Physical - Hospitalist Service       Date of Admission:  7/21/2025    Assessment & Plan      Carolee Doherty is a 84 year old female with a history of bronchiectasis, hypertension, pulmonary fibrosis admitted on 7/21/2025. She presents to the emergency department for evaluation of 5 days increasing cough now with fever of 101 and is found to be hypoxic to 87% at rest on room air.    Sepsis, secondary to bilateral lower lobe pneumonia, community-acquired: tachycardic at 101, tachypnea >20, hypoxia at 88% at rest, fever of 101.  5 days of increasing cough now with fevers and chills, measured hypoxia.  May have been precipitated by a viral illness as she believes her daughter was visiting and had a respiratory illness last week.  Acute respiratory failure with hypoxia: in the setting of chronic bronchiectasis with acute infectious respiratory illness.  Reports baseline oxygen saturations in the lower 90% range, never higher than 94% at rest per her report.  Now with hypoxia to 87% at rest.  - RSV, influenza, COVID-negative  -Pulmonary toilet w/ q2h Incentive spirometry, acapella valve  - Ceftriaxone 2 g every 24 hours  - Doxycycline 100 mg twice daily  - Blood cultures x 2 pending  - Strep pneumonia/Legionella urine antigen pending  - oximetry, oxygen as needed  - As needed Robitussin, Tessalon Pearls.  Patient would like to monitor her symptoms without these medications at first  - Trial albuterol nebulizers for wheezing    Pulmonary nodule:  - Due for CT follow-up in October 2025.  Primary care team aware by recent virtual visit.    Hypertension:  -Resume prior to admission cartia  mg daily.  Reports intolerance with generics.    Adjustment disorder with mixed anxiety and depressed mood:  - Continue prior to admission sertraline 12.5 mg daily          Diet:  Regular adult diet  DVT Prophylaxis: Enoxaparin (Lovenox) SQ  Mena Catheter: Not present  Lines: None      Cardiac Monitoring: None  Code Status:  DNR, okay for a time-limited trial of intubation for reversible causes.  Discussed with patient on admission.    Clinically Significant Risk Factors Present on Admission                   # Hypertension: Noted on problem list                      Disposition Plan     Medically Ready for Discharge: Anticipated in 2-4 Days.  Anticipate approximately 2 days pending ability to wean from oxygen, resolution of fevers           Anthony Peters MD  Hospitalist Service  [facility]  Securely message with [employer] (more info)  Text page via [employer] Paging/Directory     ______________________________________________________________________    Chief Complaint   Fever, cough with shortness of breath    History is obtained from the patient, chart review, discussion with Dr. Hudson in the emergency department    History of Present Illness   Carolee Doherty is a 84 year old female who has a history of bronchiectasis, hypertension, but is generally healthy.  She lives independently with her .  Has a history of some recurrent pneumonia and bronchitis episodes.  Distant history of smoking where she describes smoking 6 cigarettes in a sitting when with friends, no use of tobacco products for weeks at a time until she got back together with her friends.  Many years since any last tobacco use.  She reports that in the 1970s she had recurrent pneumonia that seemed resistant to multiple attempts at treatment with antibiotics before improving after a course of Bactrim.  Tells me that she also had a bronchoscopy in the 1970s and was found to have a portion of her lung that was apparently nonfunctional.    At baseline she describes oxygen saturations around the 90% range.  At best, tells me that they will be 94% at rest when she is checking her oxygen at home.    Last week, her daughter apparently had a respiratory illness when she visited.    For the past 5 days, patient  has had a cough which has been worsening.  7/21/2025 she developed a fever of 101 and presented to the emergency department for further evaluation.  She tells me that she did not feel that she would survive until her upcoming visit with her primary care provider because she felt so unwell.    In the emergency department, reports that she has begun to feel better even prior to interventions.  Some of this may have had to do with her fever breaking.  She tells me that she sweated through her shirt, and has started to feel improved since that time.    Describes coughing jags which will make her feel extremely short of breath.  Deep inspiration provokes coughing episodes.    Patient with rhonchorous lower lung fields bilaterally with some end expiratory wheezes.  More superiorly she has end inspiratory wheezing noted.  Speaking in full sentences without difficulty, but currently on supplemental oxygen.    She describes a desire to discharge home soon as possible.  Discussed monitoring fever curve and following clinical improvement.  Possible that she could be discharged with home oxygen with a course of anti-infectives.  She is agreeable to monitoring improvement, again describing how she feels that she would not have survived to follow-up with her primary care provider had she not come into the emergency department.    Offered medications to help suppress cough, patient would like to monitor her symptoms without these medications for now.       Past Medical History    Past Medical History:   Diagnosis Date    Bronchitis     Edema     Hyperlipidemia     Hyperlipidemia LDL goal <130 2/23/2017    Other and unspecified hyperlipidemia     Undiagnosed cardiac murmurs     Unspecified essential hypertension        Past Surgical History   Past Surgical History:   Procedure Laterality Date    COLONOSCOPY      COLONOSCOPY N/A 4/4/2017    Procedure: COLONOSCOPY;  Surgeon: Yaakov Middleton MD;  Location: [facility]     PHACOEMULSIFICATION CLEAR CORNEA WITH STANDARD INTRAOCULAR LENS IMPLANT  9/5/2012    Procedure: PHACOEMULSIFICATION CLEAR CORNEA WITH STANDARD INTRAOCULAR LENS IMPLANT;  RIGHT PHACOEMULSIFCATION CLEAR CORNEA WITH STANDARD INTRAOCULAR LENS IMPLANT ;  Surgeon: Beto Gaston MD;  Location: [facility]    PHACOEMULSIFICATION CLEAR CORNEA WITH STANDARD INTRAOCULAR LENS IMPLANT  10/31/2012    Procedure: PHACOEMULSIFICATION CLEAR CORNEA WITH STANDARD INTRAOCULAR LENS IMPLANT;  LEFT PHACOEMULSIFICATION CLEAR CORNEA WITH STANDARD INTRAOCULAR LENS IMPLANT ;  Surgeon: Beto Gaston MD;  Location: [facility]       Prior to Admission Medications   Prior to Admission Medications   Prescriptions Last Dose Informant Patient Reported? Taking?   diltiazem ER COATED BEADS (CARDIZEM CD/CARTIA XT) 180 MG 24 hr capsule   No No   Sig: Take 1 capsule (180 mg) by mouth daily.   sertraline (ZOLOFT) 25 MG tablet   No No   Sig: Take 0.5 tablets (12.5 mg) by mouth daily      Facility-Administered Medications: None           Physical Exam   Vital Signs: Temp: 98.9  F (37.2  C) Temp src: Temporal BP: (!) 145/66 Pulse: 93   Resp: (!) 43 SpO2: (!) 88 % O2 Device: None (Room air)      General Appearance: Well-appearing 84-year-old female.  Appears younger than stated age. Does not appear toxic  Eyes: No scleral icterus or injection  HEENT: Normocephalic and atraumatic  Respiratory: rhonchorous lower lung fields bilaterally with some end expiratory wheezes.  More superiorly she has end inspiratory wheezing noted.   Cardiovascular: Mild tachycardia in the 100 range.  No appreciable murmur at this time.  GI: Abdomen soft, no palpable mass.  Nontender to palpation.  Lymph/Hematologic: No lower extremity swelling appreciated.  Reports recent swelling associated with generic diltiazem course recently  Musculoskeletal: Muscular tone and bulk intact in all extremities and appropriate for age.  Neurologic: Alert, conversant, appropriate in conversation.  Mental  status is grossly intact  Psychiatric: Very pleasant, normal affect    Medical Decision Making       65 MINUTES SPENT BY ME on the date of service doing chart review, history, exam, documentation & further activities per the note.      Data     I have personally reviewed the following data over the past 24 hrs:    8.4  \   14.3   / 172     136 101 15.7 /  119 (H)   3.5 23 0.69 \       Imaging results reviewed over the past 24 hrs:   Recent Results (from the past 24 hours)   Chest XR,  PA & LAT    Narrative    EXAM: XR CHEST 2 VIEWS  LOCATION: [facility]  DATE: 7/21/2025    INDICATION: Fever and shortness of breath.  COMPARISON: 10/16/2024.      Impression    IMPRESSION: Coarsened bilateral interstitial opacities, increased from previous examination, favoring a progression of fibrosis and bronchiectatic change, though acute interstitial infiltrates are difficult to exclude. No large focal airspace   consolidation.    No pleural effusion or pneumothorax.    Heart size is normal.

## 2025-07-22 NOTE — PHARMACY-ADMISSION MEDICATION HISTORY
Pharmacist Admission Medication History    Admission medication history is complete. The information provided in this note is only as accurate as the sources available at the time of the update.    Information Source(s): Patient and CareEverywhere/SureScripts via in-person    Pertinent Information: None    Changes made to PTA medication list:  Added: Multivitamin, apap  Deleted: None  Changed: None    Allergies reviewed with patient and updates made in EHR: no    Medication History Completed By: Christina Baig [facility] 7/22/2025 9:47 AM    PTA Med List   Medication Sig Last Dose/Taking    acetaminophen (TYLENOL) 325 MG tablet Take 325-650 mg by mouth every 6 hours as needed for mild pain. Taking As Needed    diltiazem ER COATED BEADS (CARDIZEM CD/CARTIA XT) 180 MG 24 hr capsule Take 1 capsule (180 mg) by mouth daily. 7/21/2025    multivitamin w/minerals (THERA-VIT-M) tablet Take 1 tablet by mouth daily. Taking    sertraline (ZOLOFT) 25 MG tablet Take 0.5 tablets (12.5 mg) by mouth daily 7/21/2025

## 2025-07-23 ENCOUNTER — APPOINTMENT (OUTPATIENT)
Dept: CARDIOLOGY | Facility: CLINIC | Age: 85
End: 2025-07-23
Attending: INTERNAL MEDICINE
Payer: COMMERCIAL

## 2025-07-23 LAB — LVEF ECHO: NORMAL

## 2025-07-23 PROCEDURE — 250N000013 HC RX MED GY IP 250 OP 250 PS 637: Performed by: INTERNAL MEDICINE

## 2025-07-23 PROCEDURE — 250N000011 HC RX IP 250 OP 636: Performed by: INTERNAL MEDICINE

## 2025-07-23 PROCEDURE — 999N000157 HC STATISTIC RCP TIME EA 10 MIN

## 2025-07-23 PROCEDURE — 120N000001 HC R&B MED SURG/OB

## 2025-07-23 PROCEDURE — 94640 AIRWAY INHALATION TREATMENT: CPT

## 2025-07-23 PROCEDURE — 255N000002 HC RX 255 OP 636: Performed by: INTERNAL MEDICINE

## 2025-07-23 PROCEDURE — 94640 AIRWAY INHALATION TREATMENT: CPT | Mod: 76

## 2025-07-23 PROCEDURE — 272N000202 HC AEROBIKA WITH MANOMETER

## 2025-07-23 PROCEDURE — 93306 TTE W/DOPPLER COMPLETE: CPT | Mod: 26 | Performed by: INTERNAL MEDICINE

## 2025-07-23 PROCEDURE — 94799 UNLISTED PULMONARY SVC/PX: CPT

## 2025-07-23 PROCEDURE — 999N000208 ECHOCARDIOGRAM COMPLETE

## 2025-07-23 PROCEDURE — 99232 SBSQ HOSP IP/OBS MODERATE 35: CPT | Performed by: INTERNAL MEDICINE

## 2025-07-23 PROCEDURE — 250N000009 HC RX 250: Performed by: INTERNAL MEDICINE

## 2025-07-23 PROCEDURE — 99222 1ST HOSP IP/OBS MODERATE 55: CPT | Performed by: INTERNAL MEDICINE

## 2025-07-23 RX ORDER — METHYLPREDNISOLONE SODIUM SUCCINATE 125 MG/2ML
60 INJECTION INTRAMUSCULAR; INTRAVENOUS EVERY 12 HOURS
Status: DISPENSED | OUTPATIENT
Start: 2025-07-23

## 2025-07-23 RX ORDER — DOXYCYCLINE 100 MG/1
100 CAPSULE ORAL EVERY 12 HOURS SCHEDULED
Status: DISPENSED | OUTPATIENT
Start: 2025-07-23 | End: 2025-07-29

## 2025-07-23 RX ADMIN — DOXYCYCLINE 100 MG: 100 INJECTION, POWDER, LYOPHILIZED, FOR SOLUTION INTRAVENOUS at 11:17

## 2025-07-23 RX ADMIN — CEFTRIAXONE SODIUM 2 G: 2 INJECTION, POWDER, FOR SOLUTION INTRAMUSCULAR; INTRAVENOUS at 22:42

## 2025-07-23 RX ADMIN — IPRATROPIUM BROMIDE AND ALBUTEROL SULFATE 3 ML: .5; 3 SOLUTION RESPIRATORY (INHALATION) at 07:43

## 2025-07-23 RX ADMIN — DOXYCYCLINE HYCLATE 100 MG: 100 CAPSULE ORAL at 22:53

## 2025-07-23 RX ADMIN — PANTOPRAZOLE SODIUM 40 MG: 40 TABLET, DELAYED RELEASE ORAL at 06:44

## 2025-07-23 RX ADMIN — IPRATROPIUM BROMIDE AND ALBUTEROL SULFATE 3 ML: .5; 3 SOLUTION RESPIRATORY (INHALATION) at 20:17

## 2025-07-23 RX ADMIN — ENOXAPARIN SODIUM 40 MG: 40 INJECTION SUBCUTANEOUS at 11:17

## 2025-07-23 RX ADMIN — IPRATROPIUM BROMIDE AND ALBUTEROL SULFATE 3 ML: .5; 3 SOLUTION RESPIRATORY (INHALATION) at 15:48

## 2025-07-23 RX ADMIN — IPRATROPIUM BROMIDE AND ALBUTEROL SULFATE 3 ML: .5; 3 SOLUTION RESPIRATORY (INHALATION) at 11:25

## 2025-07-23 RX ADMIN — ACETAMINOPHEN 650 MG: 325 TABLET ORAL at 20:43

## 2025-07-23 RX ADMIN — DILTIAZEM HYDROCHLORIDE 180 MG: 180 CAPSULE, COATED, EXTENDED RELEASE ORAL at 09:07

## 2025-07-23 RX ADMIN — PERFLUTREN 2 ML (DILUTED): 6.52 INJECTION, SUSPENSION INTRAVENOUS at 09:05

## 2025-07-23 RX ADMIN — SERTRALINE HYDROCHLORIDE 12.5 MG: 25 TABLET ORAL at 09:07

## 2025-07-23 RX ADMIN — METHYLPREDNISOLONE SODIUM SUCCINATE 62.5 MG: 125 INJECTION, POWDER, FOR SOLUTION INTRAMUSCULAR; INTRAVENOUS at 11:16

## 2025-07-23 RX ADMIN — METHYLPREDNISOLONE SODIUM SUCCINATE 62.5 MG: 125 INJECTION, POWDER, FOR SOLUTION INTRAMUSCULAR; INTRAVENOUS at 22:42

## 2025-07-23 ASSESSMENT — ENCOUNTER SYMPTOMS
WHEEZING: 1
SPUTUM PRODUCTION: 1
CHILLS: 0
SHORTNESS OF BREATH: 1
FEVER: 1
WEIGHT LOSS: 0
COUGH: 1

## 2025-07-23 ASSESSMENT — ACTIVITIES OF DAILY LIVING (ADL)
ADLS_ACUITY_SCORE: 39

## 2025-07-23 NOTE — CONSULTS
"    [place] [address] [address] [facility] [address]  Phone: [phone]  Fax: [phone]      Pulmonary Consult  Carolee Doherty MRN: [id number]  1940  Date of Admission:7/21/2025  Primary care provider: Kristian Gupta  ___________________________________    Carolee Doherty MRN# [id number]   YOB: 1940 Age: 84 year old   Date of Admission: 7/21/2025     Reason for consult: \"Fibrosis on chest x-ray\"         Assessment and Recommendations:     ## Pulmonary fibrosis  ## History of mycoplasma pneumonia  ## Possible small airways disease  ## Pulmonary nodules  ## Hiatal hernia    She reports a history of frequent pneumonias through most of her life including a pretty severe episode in the 70s that was found out to be mycoplasma pneumonia.  Reports that she was told after that that she had scarring in her lungs.  Imaging with fibrosis and traction bronchiectasis.  The fibrosis is likely secondary to her frequent pneumonias.  She reports frequent wheezing, and certainly has prominent wheezing on my exam as well as mosaic attenuation on imaging concerning for possible small airways disease, though this all could be reactive from her infection.  Unfortunately she has not had PFTs previously that I am able to find.    CT this admission revealed scattered bilateral pulmonary nodules the largest of which is in the left lower lobe now measuring 9 mm in size (up from 7 mm).    I do not think any additional workup or treatment is necessary for the fibrosis at this time, I would focus on treating the active pneumonia.  However, I think she should establish with a pulmonologist as an outpatient for further evaluation.  While her frequent pneumonias are likely the cause of the fibrosis, she does have a small hiatal hernia and patulous esophagus with mural thickening suggesting chronic reflux.  She denies GERD symptoms but chronic passive aspiration could also be contributing to her " fibrosis.      - Antibiotics per primary team  - Agree with prednisone and scheduled DuoNebs  - Agree with Aerobika for mucus clearance    -Following discharge she should establish care with a pulmonologist for potential further evaluation of her fibrosis, assessment to determine if there is underlying asthma/reactive airways disease, and nodule follow-up        Alberto Chester M.D.  Pulmonary & Critical Care  Pager: Click Here to page      I spent 60 minutes dedicated to this care so far today excluding procedures, including review of medical records, review of imaging (results & images), time with patient and time in documentation.             HPI:     Carolee Doherty is a 84 year old female with HO mycoplasma pneumonia, bronchitis, edema, and cardiac murmur admitted 7/21/2025 for cough, increasing fatigue, slight lower extremity edema, and fever to 101 being seen for some chest x-ray.    In the ED she was found to be hypoxic to 87% on room air.    No leukocytosis, procalcitonin low, CRP somewhat elevated at 15 on admission with fever to 101.7.  Afebrile since then.  Fluvid swab, Legionella, and strep pneumo urine antigens negative.    Today she reports that she is recovering well.  Remains on supplemental oxygen but feels that her functional status has been slowly improving.               Past Medical History:     Past Medical History:   Diagnosis Date    Bronchitis     Edema     Hyperlipidemia     Hyperlipidemia LDL goal <130 02/23/2017    Mycoplasma pneumonia     1970's per patient (eaton agent pneumonia)    Other and unspecified hyperlipidemia     Undiagnosed cardiac murmurs     Unspecified essential hypertension               Past Surgical History:      Past Surgical History:   Procedure Laterality Date    COLONOSCOPY      COLONOSCOPY N/A 4/4/2017    Procedure: COLONOSCOPY;  Surgeon: Yaakov Middleton MD;  Location: [facility]    PHACOEMULSIFICATION CLEAR CORNEA WITH STANDARD INTRAOCULAR LENS IMPLANT  9/5/2012     Procedure: PHACOEMULSIFICATION CLEAR CORNEA WITH STANDARD INTRAOCULAR LENS IMPLANT;  RIGHT PHACOEMULSIFCATION CLEAR CORNEA WITH STANDARD INTRAOCULAR LENS IMPLANT ;  Surgeon: Beto Gaston MD;  Location: [facility]    PHACOEMULSIFICATION CLEAR CORNEA WITH STANDARD INTRAOCULAR LENS IMPLANT  10/31/2012    Procedure: PHACOEMULSIFICATION CLEAR CORNEA WITH STANDARD INTRAOCULAR LENS IMPLANT;  LEFT PHACOEMULSIFICATION CLEAR CORNEA WITH STANDARD INTRAOCULAR LENS IMPLANT ;  Surgeon: Beto Gaston MD;  Location: [facility]              Social History:     Social History     Socioeconomic History    Marital status:      Spouse name: Not on file    Number of children: Not on file    Years of education: Not on file    Highest education level: Not on file   Occupational History    Not on file   Tobacco Use    Smoking status: Former     Current packs/day: 0.10     Average packs/day: 0.1 packs/day for 3.0 years (0.3 ttl pk-yrs)     Types: Cigarettes    Smokeless tobacco: Never   Vaping Use    Vaping status: Never Used   Substance and Sexual Activity    Alcohol use: No    Drug use: No    Sexual activity: Not Currently   Other Topics Concern    Parent/sibling w/ CABG, MI or angioplasty before 65F 55M? No   Social History Narrative    Not on file     Social Drivers of Health     Financial Resource Strain: Low Risk  (7/22/2025)    Financial Resource Strain     Within the past 12 months, have you or your family members you live with been unable to get utilities (heat, electricity) when it was really needed?: No   Food Insecurity: Low Risk  (7/22/2025)    Food Insecurity     Within the past 12 months, did you worry that your food would run out before you got money to buy more?: No     Within the past 12 months, did the food you bought just not last and you didn t have money to get more?: No   Transportation Needs: Low Risk  (7/22/2025)    Transportation Needs     Within the past 12 months, has lack of transportation kept you from  medical appointments, getting your medicines, non-medical meetings or appointments, work, or from getting things that you need?: No   Physical Activity: Unknown (11/6/2024)    Exercise Vital Sign     Days of Exercise per Week: 3 days     Minutes of Exercise per Session: Not on file   Stress: No Stress Concern Present (11/6/2024)    [employer] - Occupational Stress Questionnaire     Feeling of Stress : Only a little   Social Connections: Unknown (11/6/2024)    Social Connection and Isolation Panel [NHANES]     Frequency of Communication with Friends and Family: Not on file     Frequency of Social Gatherings with Friends and Family: More than three times a week     Attends Gnosticism Services: Not on file     Active Member of Clubs or Organizations: Not on file     Attends Club or Organization Meetings: Not on file     Marital Status: Not on file   Interpersonal Safety: Low Risk  (7/22/2025)    Interpersonal Safety     Do you feel physically and emotionally safe where you currently live?: Yes     Within the past 12 months, have you been hit, slapped, kicked or otherwise physically hurt by someone?: No     Within the past 12 months, have you been humiliated or emotionally abused in other ways by your partner or ex-partner?: No   Housing Stability: Low Risk  (7/22/2025)    Housing Stability     Do you have housing? : Yes     Are you worried about losing your housing?: No               Family History:     Family History   Problem Relation Age of Onset    Family History Negative Mother     Family History Negative Father     Diabetes No family hx of     Coronary Artery Disease No family hx of     Hypertension No family hx of     Hyperlipidemia No family hx of     Cerebrovascular Disease No family hx of     Breast Cancer No family hx of     Colon Cancer No family hx of     Prostate Cancer No family hx of     Other Cancer No family hx of     Depression No family hx of     Anxiety Disorder No  family hx of     Mental Illness No family hx of     Substance Abuse No family hx of     Anesthesia Reaction No family hx of     Asthma No family hx of     Osteoporosis No family hx of     Genetic Disorder No family hx of     Thyroid Disease No family hx of     Obesity No family hx of     Unknown/Adopted No family hx of               Immunizations:     Immunization History   Administered Date(s) Administered    COVID-19 12+ (Pfizer) 10/02/2023, 06/28/2024, 09/05/2024, 03/20/2025    COVID-19 Bivalent 12+ (Pfizer) 09/15/2022, 05/17/2023    COVID-19 MONOVALENT 12+ (Pfizer) 03/01/2021, 03/22/2021, 10/10/2021    COVID-19 Monovalent 12+ (Pfizer 2022) 04/23/2022    Influenza (H1N1) 12/17/2009    Influenza (High Dose) Trivalent,PF (Fluzone) 10/06/2015, 09/15/2016, 09/25/2017, 09/27/2018, 09/09/2019, 10/15/2022, 10/29/2024    Influenza (IIV3) PF 09/23/2013    Influenza (prior to 2024) 09/14/2020    Influenza Vaccine 65+ (Fluzone HD) 09/07/2021, 10/15/2022, 10/07/2023    Influenza Vaccine >6 months,quad, PF 10/07/2014    Influenza, Split Virus, Trivalent, Pf (Fluzone\Fluarix) 09/14/2020    Pneumo Conj 13-V (2010&after) 02/23/2017    Pneumococcal 20 valent Conjugate (Prevnar 20) 10/07/2023    Pneumococcal 23 valent 10/25/2001, 05/03/2018    RSV (Abrysvo) 10/07/2023    TDAP Vaccine (Adacel) 01/04/2016    Zoster recombinant adjuvanted (Shingrix) 12/27/2018, 04/15/2019              Allergies:     Allergies   Allergen Reactions    Amlodipine Besylate Swelling    Levaquin      Strange thoughts    Septra Ds [Sulfamethoxazole W-Trimethoprim]      Felt lousy                Medications:     Current Facility-Administered Medications   Medication Dose Route Frequency Provider Last Rate Last Admin    acetaminophen (TYLENOL) tablet 650 mg  650 mg Oral Q4H PRN Anthony Peters MD   650 mg at 07/22/25 2310    Or    acetaminophen (TYLENOL) Suppository 650 mg  650 mg Rectal Q4H PRN Anthony Peters MD        albuterol (PROVENTIL) neb  solution 2.5 mg  2.5 mg Nebulization Q2H PRN Peters, Anthony Saavedra MD        artificial saliva (BIOTENE MT) solution 2 spray  2 spray Mouth/Throat Q1H PRN Peters, Anthony Saavedra MD        benzonatate (TESSALON) capsule 100 mg  100 mg Oral TID PRN Peters, Anthony Saavedra MD        bisacodyl (DULCOLAX) suppository 10 mg  10 mg Rectal Daily PRN Peters, Anthony Saavedra MD        cefTRIAXone (ROCEPHIN) 2 g vial to attach to  ml bag for ADULTS or NS 50 ml bag for PEDS  2 g Intravenous Q24H Peters, Anthony Saavedra MD   2 g at 07/22/25 2251    diltiazem ER COATED BEADS (CARDIZEM CD/CARTIA XT) 24 hr capsule 180 mg  180 mg Oral Daily Peters, Anthony Saavedra MD   180 mg at 07/23/25 0907    doxycycline (VIBRAMYCIN) 100 mg vial to attach to  mL bag  100 mg Intravenous Q12H Peters, Anthony Saavedra MD   100 mg at 07/23/25 1117    enoxaparin ANTICOAGULANT (LOVENOX) injection 40 mg  40 mg Subcutaneous Q24H Peters, Anthony Saavedra MD   40 mg at 07/23/25 1117    guaiFENesin (ROBITUSSIN) 20 mg/mL solution 200 mg  200 mg Oral Q4H PRN Peters, Anthony Saavedra MD        hydrALAZINE (APRESOLINE) tablet 10 mg  10 mg Oral Q4H PRN Peters, Anthony Saavedra MD        Or    hydrALAZINE (APRESOLINE) injection 10 mg  10 mg Intravenous Q4H PRN Peters, Anthony Saavedra MD        ibuprofen (ADVIL/MOTRIN) tablet 400 mg  400 mg Oral Q6H PRN Quyen Hernandez MD        ipratropium - albuterol 0.5 mg/2.5 mg (3mg)/3 mL (DUONEB) neb solution 3 mL  3 mL Nebulization 4x daily Quyen Hernandez MD   3 mL at 07/23/25 1125    lidocaine (LMX4) cream   Topical Q1H PRN Peters, Anthony Saavedra MD        lidocaine 1 % 0.1-1 mL  0.1-1 mL Other Q1H PRN Peters, Anthony Saavedra MD        melatonin tablet 1 mg  1 mg Oral At Bedtime PRN Peters, Anthony Saavedra MD        methylPREDNISolone Na Suc (solu-MEDROL) injection 62.5 mg  62.5 mg Intravenous Q12H Quyen Hernandez MD   62.5 mg at 07/23/25 1116    naloxone (NARCAN) injection 0.2 mg  0.2 mg Intravenous Q2 Min PRN Quyen Hernandez MD        Or    naloxone (NARCAN) injection 0.4 mg  0.4  mg Intravenous Q2 Min PRN Quyen Hernandez MD        Or    naloxone (NARCAN) injection 0.2 mg  0.2 mg Intramuscular Q2 Min PRN Quyen Hernandez MD        Or    naloxone (NARCAN) injection 0.4 mg  0.4 mg Intramuscular Q2 Min PRN Quyen Hernandez MD        ondansetron (ZOFRAN ODT) ODT tab 4 mg  4 mg Oral Q6H PRN Fran, Anthony Saavedra MD        Or    ondansetron (ZOFRAN) injection 4 mg  4 mg Intravenous Q6H PRN Peters, Anthony Saavedra MD        oxyCODONE (ROXICODONE) tablet 5 mg  5 mg Oral Q4H PRN Peters, Anthony Saavedra MD        oxyCODONE IR (ROXICODONE) half-tab 2.5 mg  2.5 mg Oral Q4H PRN Fran, Anthony Saavedra MD        pantoprazole (PROTONIX) EC tablet 40 mg  40 mg Oral QAM AC Quyen Hernandez MD   40 mg at 07/23/25 0644    polyethylene glycol (MIRALAX) Packet 17 g  17 g Oral BID PRN Peters, Anthony Saavedra MD        prochlorperazine (COMPAZINE) injection 5 mg  5 mg Intravenous Q6H PRN Peters, Anthony Saavedra MD        Or    prochlorperazine (COMPAZINE) tablet 5 mg  5 mg Oral Q6H PRN Peters, Anthony Saavedra MD        senna-docusate (SENOKOT-S/PERICOLACE) 8.6-50 MG per tablet 1 tablet  1 tablet Oral BID PRN Fran, Anthony Saavedra MD        Or    senna-docusate (SENOKOT-S/PERICOLACE) 8.6-50 MG per tablet 2 tablet  2 tablet Oral BID PRN Fran, Anthony Saavedra MD        sertraline (ZOLOFT) half-tab 12.5 mg  12.5 mg Oral Daily Peters, Anthony Saavedra MD   12.5 mg at 07/23/25 0907    sodium chloride (PF) 0.9% PF flush 3 mL  3 mL Intracatheter Q8H ANSLEY Anthony Peters MD   3 mL at 07/23/25 0644    sodium chloride (PF) 0.9% PF flush 3 mL  3 mL Intracatheter q1 min prn Fran, Anthony Saavedra MD   3 mL at 07/23/25 1158               Review of Systems:     Review of Systems   Constitutional:  Positive for fever. Negative for chills and weight loss.        Fever prior to admission, none since   Respiratory:  Positive for cough, sputum production, shortness of breath and wheezing.               Exam:   /64 (BP Location: Left arm)   Pulse 83   Temp 98.2  F (36.8  C) (Oral)    Resp 18   Wt 73.4 kg (161 lb 13.1 oz)   SpO2 92%   BMI 26.12 kg/m      Vitals:    07/22/25 0849 07/23/25 0610   Weight: 70.7 kg (155 lb 13.8 oz) 73.4 kg (161 lb 13.1 oz)         Physical Exam  Vitals and nursing note reviewed.   Constitutional:       Appearance: Normal appearance.   Cardiovascular:      Rate and Rhythm: Normal rate and regular rhythm.      Heart sounds: Murmur heard.   Pulmonary:      Effort: Pulmonary effort is normal.   Neurological:      Mental Status: She is alert.                Data:   ROUTINE ICU LABS (Last four results)  CMP  Recent Labs   Lab 07/21/25  2222      POTASSIUM 3.5   CHLORIDE 101   CO2 23   ANIONGAP 12   *   BUN 15.7   CR 0.69   GFRESTIMATED 86   YAZAN 9.5     CBC  Recent Labs   Lab 07/21/25  2222   WBC 8.4   RBC 4.72   HGB 14.3   HCT 40.7   MCV 86   MCH 30.3   MCHC 35.1   RDW 14.1                   Imaging:     CT PE 7/22/2025  1.  No pulmonary embolism or pneumonia.  2.  Redemonstration of multifocal areas of fibrosis and traction bronchiectasis most prominent in the right middle lobe and lingula, with generalized mosaic attenuation of the lung.  3.  Mild mediastinal and bilateral hilar lymphadenopathy, possibly reactive/physiologic.  4.  Scattered bilateral pulmonary nodules, largest in the left lower lobe is mildly increased in size now measuring 9 mm, previously 7 mm. Another 5 mm nodule in the right lower lobe is new. Recommend follow-up chest CT in 3 months applying Lung RADS   v.2022 principles.  5.  Stable small hiatal hernia.          The above note was dictated using voice recognition software and may include typographical errors. Please contact the author for any clarifications.

## 2025-07-23 NOTE — PLAN OF CARE
Goal Outcome Evaluation:        Overall Patient Progress: improvingOverall Patient Progress: improving    Date/Time 7/23/25 [id number]    Trauma/Ortho/Medical (Choose one)  Medical    Diagnosis: Hypoxia, Community Acquired Pneumonia  POD#:NA  Mental Status:A&O X 4  Activity/dangle standby assist with walker  Diet: Regular  Pain: PRN Tylenol  Mena/Voiding: Bathroom  Tele/Restraints/Iso: NA  02/LDA: On 3L NC. PIV SL  D/C Date: Pending  Other Info: Encourage IS and fluid intake

## 2025-07-23 NOTE — PROGRESS NOTES
MD Notification    Notified Person: MD    Notified Person Name: Mariel Abdalla    Notification Date/Time: 07/22/25 22:15    Notification Interaction: [employer] Messaging    Purpose of Notification: Lab result for CRP inflammation is 15.61.    Orders Received: order received at this time    Comments:

## 2025-07-23 NOTE — PROGRESS NOTES
[facility]    Medicine Progress Note - Hospitalist Service    Date of Admission:  7/21/2025    Assessment & Plan     Carolee Doherty is a 84 year old female with a history of bronchiectasis, hypertension, pulmonary fibrosis admitted on 7/21/2025. She presents to the emergency department for evaluation of 5 days increasing cough now with fever of 101 and is found to be hypoxic to 87% at rest on room air.     Sepsis, secondary to bilateral lower lobe pneumonia, community-acquired  Acute hypoxic respiratory failure secondary to bilateral lung pneumonia;  History of pulmonary fibrosis and bronchiectasis  tachycardic at 101, tachypnea >20, hypoxia at 87% at rest, fever of 101.  5 days of increasing cough now with fevers and chills, measured hypoxia.  May have been precipitated by a viral illness as she believes her daughter was visiting and had a respiratory illness last week.   RSV, influenza, COVID-19 testing returned negative  Continue ceftriaxone and doxycycline  DuoNebs 4 times daily ordered  CRP checked returned elevated at 15.61.  Procalcitonin normal at 0.07  Blood cultures x 2 are negative so far  Legionella and strep urinary antigens returned negative  Echo results returned normal  Patient with coarse breath sounds added Solu-Medrol 60 mg  daily and increased to twice daily on 7/23/2025 to help with reactive airway disease in the setting of bilateral lung pneumonia  PPI addition while on steroids  Albuterol nebulizer as needed for wheezing  Wean off of oxygen as tolerated  Encourage incentive spirometry as tolerated  Pulmonary consultation requested regarding further evaluation management with her history of pulmonary fibrosis    Pulmonary nodule:  - Due for CT follow-up in October 2025.  Primary care team aware by recent virtual visit.     Hypertension:  -Resume prior to admission cartia  mg daily.  Reports intolerance with generics.     Adjustment disorder with mixed anxiety and  depressed mood:  - Continue prior to admission sertraline 12.5 mg daily              Diet: Combination Diet Regular Diet Adult    DVT Prophylaxis: Enoxaparin (Lovenox) SQ  Mena Catheter: Not present  Lines: None     Cardiac Monitoring: None  Code Status: No CPR- Pre-arrest intubation OK      Clinically Significant Risk Factors                   # Hypertension: Noted on problem list                       Social Drivers of Health    Tobacco Use: Medium Risk (4/28/2025)    Patient History     Smoking Tobacco Use: Former     Smokeless Tobacco Use: Never   Physical Activity: Unknown (11/6/2024)    Exercise Vital Sign     Days of Exercise per Week: 3 days   Social Connections: Unknown (11/6/2024)    Social Connection and Isolation Panel [NHANES]     Frequency of Social Gatherings with Friends and Family: More than three times a week          Disposition Plan     Medically Ready for Discharge: Anticipated in 2-4 Days    Once hypoxia improves and respiratory status is stable     Most likely will discharge back to home    Quyen Hernandez MD  Hospitalist Service  [facility]  Securely message with [employer] (more info)  Text page via [employer] Paging/Directory   ______________________________________________________________________    Interval History   Chart reviewed.  Discussed with bedside RN  Patient resting comfortably in bed.  Feels slightly better.  Shortness of breath improving.  Still needing 3 L of oxygen by nasal cannula.  No other acute issues    Physical Exam   Vital Signs: Temp: 98.2  F (36.8  C) Temp src: Oral BP: 136/64 Pulse: 83   Resp: 18 SpO2: 92 % O2 Device: Nasal cannula Oxygen Delivery: 3 LPM  Weight: 161 lbs 13.08 oz    General Appearance: Pleasant female sitting comfortably in bed alert oriented x 3 not in acute distress  Respiratory: Bilateral crackles heard on auscultation, no wheezing  Cardiovascular: Normal rate rhythm regular  GI: Soft, nontender nondistended bowel sounds  positive  Skin: Warm and dry  Other: No lower extremity edema, calm and cooperative    Medical Decision Making       55 MINUTES SPENT BY ME on the date of service doing chart review, history, exam, documentation & further activities per the note.      Data     I have personally reviewed the following data over the past 24 hrs:    Procal: N/A CRP: N/A Lactic Acid: N/A         Imaging results reviewed over the past 24 hrs:   Recent Results (from the past 24 hours)   CTA Chest with Contrast    Narrative    EXAM: CTA CHEST WITH CONTRAST  LOCATION: [facility]  DATE: 7/22/2025    INDICATION: Shortness of breath  COMPARISON: CT chest 10/29/2024  TECHNIQUE: CT chest pulmonary angiogram during arterial phase injection of IV contrast. Multiplanar reformats and MIP reconstructions were performed. Dose reduction techniques were used.   CONTRAST: 72 mL Isovue 370    ANGIOGRAM CHEST: Pulmonary arteries are normal caliber and negative for pulmonary emboli. Thoracic aorta is negative for dissection. No CT evidence of right heart strain.    LUNGS AND PLEURA: Mild mosaic attenuation again seen throughout bilateral lung in similar distribution as prior study, nonspecific. Multifocal areas of bronchiectasis/fibrosis redemonstrated in the bilateral lung, most prominent in the right middle lobe   and lingula. No new confluent airspace consolidation, pleural effusion or pneumothorax. A 9 mm nodule in the posterior left lower lobe on series 6 image 180 is mildly increased in size, previously 9 mm. A 3 mm nodule in the anterior right lower lobe on   image 213 is stable. A 5 mm right lower lobe nodule on series 6 image 185 is new.    MEDIASTINUM/AXILLAE: Mild mediastinal and bilateral hilar lymphadenopathy is difficult to compare with prior noncontrast enhanced study, however appear grossly stable. For example, a mildly prominent right hilar lymph node measures 1.5 x 1.1 cm on series   5 image 82. These lymph nodes  are indeterminate but most likely reactive in nature. No axillary or supraclavicular lymphadenopathy. Tiny subcentimeter hypoattenuating nodules are present in the left thyroid. Heart size is normal without pericardial   effusion. A small hiatal hernia is stable.    CORONARY ARTERY CALCIFICATION: None.    UPPER ABDOMEN: No acute findings. A 1.4 cm cyst in the superior pole of the left kidney does not require follow-up. 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    MUSCULOSKELETAL: Mild anterior osteophyte formation in the midthoracic spine. No suspicious osseous lesions or acute fractures.         Impression    IMPRESSION:    1.  No pulmonary embolism or pneumonia.    2.  Redemonstration of multifocal areas of fibrosis and traction bronchiectasis most prominent in the right middle lobe and lingula, with generalized mosaic attenuation of the lung.    3.  Mild mediastinal and bilateral hilar lymphadenopathy, possibly reactive/physiologic.    4.  Scattered bilateral pulmonary nodules, largest in the left lower lobe is mildly increased in size now measuring 9 mm, previously 7 mm. Another 5 mm nodule in the right lower lobe is new. Recommend follow-up chest CT in 3 months applying Lung RADS   v.2022 principles.    5.  Stable small hiatal hernia.     Echocardiogram Complete   Result Value    LVEF  55%    Narrative    [id number]  [id number]  [id number]^[email or web address]^     [facility]  Echocardiography Laboratory  [address] [place] [address]     Name: SHANIA FORREST  MRN: [id number]  : 1940  Study Date: 2025 08:21 AM  Age: 84 yrs  Gender: Female  Patient Location: [facility]  Reason For Study: SOB  Ordering Physician: ZAACRIAS MCCONNELL  Referring Physician: Kristian Gupta  Performed By: Maxine Du RDCS     BSA: 1.8 m2  Height: 66 in  Weight: 161 lb  HR: 56  BP: 128/70  mmHg  ______________________________________________________________________________  Procedure  Echocardiogram with two-dimensional, color and spectral Doppler. Definity (NDC  #60982-213) given intravenously.  ______________________________________________________________________________  Interpretation Summary     1. The left ventricle is normal in structure, function and size. The visual  ejection fraction is estimated at 55%.  2. The right ventricle is normal in structure, function and size.  3. No valve disease.     No previous echo for comparison.  ______________________________________________________________________________  Left Ventricle  The left ventricle is normal in structure, function and size. There is normal  left ventricular wall thickness. The visual ejection fraction is estimated at  55%. Left ventricular diastolic function is indeterminate. Normal left  ventricular wall motion.     Right Ventricle  The right ventricle is normal in structure, function and size.     Atria  The left atrium is borderline dilated. Right atrial size is normal. There is  no atrial shunt seen.     Mitral Valve  There is mild (1+) mitral regurgitation.     Tricuspid Valve  There is mild (1+) tricuspid regurgitation.     Aortic Valve  The aortic valve is normal in structure and function.     Pulmonic Valve  The pulmonic valve is normal in structure and function.     Vessels  Normal ascending, transverse (arch), and descending aorta. The inferior vena  cava was normal in size with preserved respiratory variability.     Pericardium  There is no pericardial effusion.     Rhythm  Sinus rhythm was noted.  ______________________________________________________________________________  MMode/2D Measurements & Calculations  IVSd: 1.1 cm     LVIDd: 4.4 cm  LVIDs: 2.3 cm  LVPWd: 0.80 cm  FS: 46.9 %  LV mass(C)d: 137.8 grams  LV mass(C)dI: 75.5 grams/m2  Ao root diam: 3.0 cm  asc Aorta Diam: 2.9 cm  LVOT diam: 1.8 cm  LVOT area: 2.5  cm2  Ao root diam index Ht(cm/m): 1.8  Ao root diam index BSA (cm/m2): 1.6  Asc Ao diam index BSA (cm/m2): 1.6  Asc Ao diam index Ht(cm/m): 1.7  LA Volume (BP): 61.5 ml     LA Volume Index (BP): 33.8 ml/m2  RWT: 0.36  TAPSE: 2.6 cm     Doppler Measurements & Calculations  MV E max giuseppe: 65.5 cm/sec  MV A max giuseppe: 90.7 cm/sec  MV E/A: 0.72  MV dec time: 0.17 sec  LV V1 max P.8 mmHg  LV V1 max: 110.0 cm/sec  LV V1 VTI: 24.2 cm  SV(LVOT): 60.4 ml  SI(LVOT): 33.1 ml/m2  PA acc time: 0.15 sec  E/E' av.2  Lateral E/e': 10.9  Medial E/e': 7.4  RV S Giuseppe: 15.3 cm/sec     ______________________________________________________________________________  Report approved by: Johny Baxter MD on 2025 10:50 AM

## 2025-07-24 VITALS
DIASTOLIC BLOOD PRESSURE: 55 MMHG | WEIGHT: 161.82 LBS | BODY MASS INDEX: 26.12 KG/M2 | HEART RATE: 74 BPM | SYSTOLIC BLOOD PRESSURE: 123 MMHG | OXYGEN SATURATION: 93 % | RESPIRATION RATE: 18 BRPM | TEMPERATURE: 98.1 F

## 2025-07-24 LAB
BACTERIA SPEC CULT: NORMAL
BACTERIA SPEC CULT: NORMAL
CREAT SERPL-MCNC: 0.62 MG/DL (ref 0.51–0.95)
EGFRCR SERPLBLD CKD-EPI 2021: 88 ML/MIN/1.73M2
MCV RBC AUTO: 87 FL (ref 78–100)
PLATELET # BLD AUTO: 207 10E3/UL (ref 150–450)

## 2025-07-24 PROCEDURE — 94640 AIRWAY INHALATION TREATMENT: CPT

## 2025-07-24 PROCEDURE — 99232 SBSQ HOSP IP/OBS MODERATE 35: CPT | Performed by: INTERNAL MEDICINE

## 2025-07-24 PROCEDURE — 999N000157 HC STATISTIC RCP TIME EA 10 MIN

## 2025-07-24 PROCEDURE — 250N000013 HC RX MED GY IP 250 OP 250 PS 637: Performed by: INTERNAL MEDICINE

## 2025-07-24 PROCEDURE — 250N000011 HC RX IP 250 OP 636: Performed by: INTERNAL MEDICINE

## 2025-07-24 PROCEDURE — 94640 AIRWAY INHALATION TREATMENT: CPT | Mod: 76

## 2025-07-24 PROCEDURE — 36415 COLL VENOUS BLD VENIPUNCTURE: CPT | Performed by: INTERNAL MEDICINE

## 2025-07-24 PROCEDURE — 250N000009 HC RX 250: Performed by: INTERNAL MEDICINE

## 2025-07-24 PROCEDURE — 120N000001 HC R&B MED SURG/OB

## 2025-07-24 PROCEDURE — 82565 ASSAY OF CREATININE: CPT | Performed by: INTERNAL MEDICINE

## 2025-07-24 PROCEDURE — 85049 AUTOMATED PLATELET COUNT: CPT | Performed by: INTERNAL MEDICINE

## 2025-07-24 RX ADMIN — DILTIAZEM HYDROCHLORIDE 180 MG: 180 CAPSULE, COATED, EXTENDED RELEASE ORAL at 09:23

## 2025-07-24 RX ADMIN — IPRATROPIUM BROMIDE AND ALBUTEROL SULFATE 3 ML: .5; 3 SOLUTION RESPIRATORY (INHALATION) at 07:46

## 2025-07-24 RX ADMIN — IPRATROPIUM BROMIDE AND ALBUTEROL SULFATE 3 ML: .5; 3 SOLUTION RESPIRATORY (INHALATION) at 11:52

## 2025-07-24 RX ADMIN — PANTOPRAZOLE SODIUM 40 MG: 40 TABLET, DELAYED RELEASE ORAL at 06:36

## 2025-07-24 RX ADMIN — SERTRALINE HYDROCHLORIDE 12.5 MG: 25 TABLET ORAL at 09:23

## 2025-07-24 RX ADMIN — DOXYCYCLINE HYCLATE 100 MG: 100 CAPSULE ORAL at 09:23

## 2025-07-24 RX ADMIN — DOXYCYCLINE HYCLATE 100 MG: 100 CAPSULE ORAL at 20:30

## 2025-07-24 RX ADMIN — ENOXAPARIN SODIUM 40 MG: 40 INJECTION SUBCUTANEOUS at 09:23

## 2025-07-24 RX ADMIN — METHYLPREDNISOLONE SODIUM SUCCINATE 62.5 MG: 125 INJECTION, POWDER, FOR SOLUTION INTRAMUSCULAR; INTRAVENOUS at 12:28

## 2025-07-24 RX ADMIN — IPRATROPIUM BROMIDE AND ALBUTEROL SULFATE 3 ML: .5; 3 SOLUTION RESPIRATORY (INHALATION) at 20:03

## 2025-07-24 RX ADMIN — ACETAMINOPHEN 650 MG: 325 TABLET ORAL at 14:12

## 2025-07-24 RX ADMIN — ACETAMINOPHEN 650 MG: 325 TABLET ORAL at 20:31

## 2025-07-24 ASSESSMENT — ACTIVITIES OF DAILY LIVING (ADL)
ADLS_ACUITY_SCORE: 39
ADLS_ACUITY_SCORE: 40
ADLS_ACUITY_SCORE: 39
ADLS_ACUITY_SCORE: 39
ADLS_ACUITY_SCORE: 40
ADLS_ACUITY_SCORE: 39
ADLS_ACUITY_SCORE: 40
ADLS_ACUITY_SCORE: 39

## 2025-07-24 NOTE — PROGRESS NOTES
[facility]    Medicine Progress Note - Hospitalist Service    Date of Admission:  7/21/2025    Assessment & Plan     Carolee Doherty is a 84 year old female with a history of bronchiectasis, hypertension, pulmonary fibrosis admitted on 7/21/2025. She presents to the emergency department for evaluation of 5 days increasing cough now with fever of 101 and is found to be hypoxic to 87% at rest on room air.     Sepsis, secondary to bilateral lower lobe pneumonia, community-acquired  Acute hypoxic respiratory failure secondary to bilateral lung pneumonia;  History of pulmonary fibrosis and bronchiectasis  tachycardic at 101, tachypnea >20, hypoxia at 87% at rest, fever of 101.  5 days of increasing cough now with fevers and chills, measured hypoxia.  May have been precipitated by a viral illness as she believes her daughter was visiting and had a respiratory illness last week.   RSV, influenza, COVID-19 testing returned negative  Continue ceftriaxone and doxycycline  DuoNebs 4 times daily ordered  CRP checked returned elevated at 15.61.  Procalcitonin normal at 0.07  Blood cultures x 2 are negative so far  Legionella and strep urinary antigens returned negative  Echo results returned normal  Patient with coarse breath sounds added Solu-Medrol 60 mg  daily and increased to twice daily on 7/23/2025 to help with reactive airway disease in the setting of bilateral lung pneumonia  PPI addition while on steroids  Albuterol nebulizer as needed for wheezing  Wean off of oxygen as tolerated  Encourage incentive spirometry as tolerated  Pulmonary consultation requested and are following.  Recommended outpatient follow-up with pulmonary team with PFTs and management of her underlying pulmonary fibrosis secondary to recurrent pneumonias in the past    Pulmonary nodule:  - Due for CT follow-up in October 2025.  Primary care team aware by recent virtual visit.     Hypertension:  -Resume prior to admission  cartia  mg daily.  Reports intolerance with generics.     Adjustment disorder with mixed anxiety and depressed mood:  - Continue prior to admission sertraline 12.5 mg daily              Diet: Combination Diet Regular Diet Adult    DVT Prophylaxis: Enoxaparin (Lovenox) SQ  Mena Catheter: Not present  Lines: None     Cardiac Monitoring: None  Code Status: No CPR- Pre-arrest intubation OK      Clinically Significant Risk Factors                   # Hypertension: Noted on problem list                       Social Drivers of Health    Tobacco Use: Medium Risk (4/28/2025)    Patient History     Smoking Tobacco Use: Former     Smokeless Tobacco Use: Never   Physical Activity: Unknown (11/6/2024)    Exercise Vital Sign     Days of Exercise per Week: 3 days   Social Connections: Unknown (11/6/2024)    Social Connection and Isolation Panel [NHANES]     Frequency of Social Gatherings with Friends and Family: More than three times a week          Disposition Plan     Medically Ready for Discharge: Anticipated in 2-4 Days    Once hypoxia improves and respiratory status is stable     Most likely will discharge back to home    Quyen Hernandez MD  Hospitalist Service  [facility]  Securely message with [employer] (more info)  Text page via [employer] Paging/Directory   ______________________________________________________________________    Interval History   Chart reviewed.  Discussed with bedside RN  Patient resting comfortably in bed.  Feels slightly better.  Shortness of breath improving.  Still needing 3 L of oxygen by nasal cannula.  Intermittent cough.  No other acute issues    Physical Exam   Vital Signs: Temp: 97.9  F (36.6  C) Temp src: Oral BP: 126/66 Pulse: 77   Resp: 18 SpO2: 93 % O2 Device: Nasal cannula Oxygen Delivery: 3 LPM  Weight: 161 lbs 13.08 oz    General Appearance: Pleasant female sitting comfortably in bed alert oriented x 3 not in acute distress  Respiratory: Bilateral crackles heard  on auscultation, no wheezing  Cardiovascular: Normal rate rhythm regular  GI: Soft, nontender nondistended bowel sounds positive  Skin: Warm and dry  Other: No lower extremity edema, calm and cooperative    Medical Decision Making       55 MINUTES SPENT BY ME on the date of service doing chart review, history, exam, documentation & further activities per the note.      Data     I have personally reviewed the following data over the past 24 hrs:    N/A  \   N/A   / 207     N/A N/A N/A /  N/A   N/A N/A 0.62 \       Imaging results reviewed over the past 24 hrs:   No results found for this or any previous visit (from the past 24 hours).

## 2025-07-24 NOTE — PROGRESS NOTES
Date/Time: Diagnosis: Hypoxia, Community Acquired Pneumonia  POD#: n/a  Mental Status: A&Ox4  Activity/dangle SBA with walker  Diet: Regular  Pain: denied  Mena/Voiding: Bathroom  Tele/Restraints/Iso: n/a  02/LDA: On 3L NC PIV SL  D/C Date: TBD, waiting for clinical improvement  Other Info: Frequent dry cough on Neb TX

## 2025-07-24 NOTE — PLAN OF CARE
Goal Outcome Evaluation:       Trauma/Ortho/Medical (Choose one)  Medical     Diagnosis: Hypoxia, SOB, Community Acquired Pneumonia  POD#:NA  Mental Status:A&O X 4  Activity/dangle standby assist with own  walker  Diet: Regular  Pain: PRN Tylenol- refuses narcs  Mena/Voiding: Bathroom  Tele/Restraints/Iso: NA  02/LDA: On 3L NC. PIV SL  D/C Date: Pending  Other Info: Encourage IS use and fluid intake. Please remind RT to do the scheduled nebs.

## 2025-07-24 NOTE — PLAN OF CARE
Goal Outcome Evaluation:      Plan of Care Reviewed With: patient    Overall Patient Progress: improvingOverall Patient Progress: improving    Outcome Evaluation: Discharge pending weaning off Oxygen as able.        Patient vital signs are at baseline: Yes  Patient able to ambulate as they were prior to admission or with assist devices provided by therapies during their stay:  Yes  Patient MUST void prior to discharge:  Yes  Patient able to tolerate oral intake:  Yes  Pain has adequate pain control using Oral analgesics:  Yes  Does patient have an identified :  Yes  Has goal D/C date and time been discussed with patient:  No,  Reason:  Discharge pending weaning off Oxygen as able, MD following.      Dx:SOB, Pneumonia  POD#n/a    A&Ox4.   CMS Intact.   VSS on O2 @ 3L via NC.   Up with SBA w/ walker.   Voiding BR/BC.   Right PIV SL.  Regular Diet.  Pain controlled.

## 2025-07-25 PROCEDURE — 250N000011 HC RX IP 250 OP 636: Mod: JZ | Performed by: INTERNAL MEDICINE

## 2025-07-25 PROCEDURE — 94640 AIRWAY INHALATION TREATMENT: CPT

## 2025-07-25 PROCEDURE — 250N000009 HC RX 250: Performed by: INTERNAL MEDICINE

## 2025-07-25 PROCEDURE — 94640 AIRWAY INHALATION TREATMENT: CPT | Mod: 76

## 2025-07-25 PROCEDURE — 999N000157 HC STATISTIC RCP TIME EA 10 MIN

## 2025-07-25 PROCEDURE — 250N000013 HC RX MED GY IP 250 OP 250 PS 637: Performed by: INTERNAL MEDICINE

## 2025-07-25 PROCEDURE — 120N000001 HC R&B MED SURG/OB

## 2025-07-25 PROCEDURE — 250N000011 HC RX IP 250 OP 636: Performed by: INTERNAL MEDICINE

## 2025-07-25 PROCEDURE — 99232 SBSQ HOSP IP/OBS MODERATE 35: CPT | Performed by: INTERNAL MEDICINE

## 2025-07-25 RX ORDER — CEFUROXIME AXETIL 250 MG/1
500 TABLET ORAL EVERY 12 HOURS SCHEDULED
Status: DISCONTINUED | OUTPATIENT
Start: 2025-07-25 | End: 2025-07-26 | Stop reason: HOSPADM

## 2025-07-25 RX ADMIN — METHYLPREDNISOLONE SODIUM SUCCINATE 62.5 MG: 125 INJECTION, POWDER, FOR SOLUTION INTRAMUSCULAR; INTRAVENOUS at 00:54

## 2025-07-25 RX ADMIN — ACETAMINOPHEN 650 MG: 325 TABLET ORAL at 18:23

## 2025-07-25 RX ADMIN — IPRATROPIUM BROMIDE AND ALBUTEROL SULFATE 3 ML: .5; 3 SOLUTION RESPIRATORY (INHALATION) at 12:33

## 2025-07-25 RX ADMIN — SERTRALINE HYDROCHLORIDE 12.5 MG: 25 TABLET ORAL at 08:08

## 2025-07-25 RX ADMIN — DOXYCYCLINE HYCLATE 100 MG: 100 CAPSULE ORAL at 19:38

## 2025-07-25 RX ADMIN — CEFUROXIME AXETIL 500 MG: 250 TABLET ORAL at 19:38

## 2025-07-25 RX ADMIN — METHYLPREDNISOLONE SODIUM SUCCINATE 62.5 MG: 125 INJECTION, POWDER, FOR SOLUTION INTRAMUSCULAR; INTRAVENOUS at 23:52

## 2025-07-25 RX ADMIN — ACETAMINOPHEN 650 MG: 325 TABLET ORAL at 02:44

## 2025-07-25 RX ADMIN — IBUPROFEN 400 MG: 400 TABLET, FILM COATED ORAL at 18:23

## 2025-07-25 RX ADMIN — IPRATROPIUM BROMIDE AND ALBUTEROL SULFATE 3 ML: .5; 3 SOLUTION RESPIRATORY (INHALATION) at 07:25

## 2025-07-25 RX ADMIN — IBUPROFEN 400 MG: 400 TABLET, FILM COATED ORAL at 12:03

## 2025-07-25 RX ADMIN — DOXYCYCLINE HYCLATE 100 MG: 100 CAPSULE ORAL at 08:08

## 2025-07-25 RX ADMIN — CEFTRIAXONE SODIUM 2 G: 2 INJECTION, POWDER, FOR SOLUTION INTRAMUSCULAR; INTRAVENOUS at 00:54

## 2025-07-25 RX ADMIN — PANTOPRAZOLE SODIUM 40 MG: 40 TABLET, DELAYED RELEASE ORAL at 06:40

## 2025-07-25 RX ADMIN — IPRATROPIUM BROMIDE AND ALBUTEROL SULFATE 3 ML: .5; 3 SOLUTION RESPIRATORY (INHALATION) at 15:26

## 2025-07-25 RX ADMIN — METHYLPREDNISOLONE SODIUM SUCCINATE 62.5 MG: 125 INJECTION, POWDER, FOR SOLUTION INTRAMUSCULAR; INTRAVENOUS at 12:00

## 2025-07-25 RX ADMIN — ENOXAPARIN SODIUM 40 MG: 40 INJECTION SUBCUTANEOUS at 12:00

## 2025-07-25 RX ADMIN — DILTIAZEM HYDROCHLORIDE 180 MG: 180 CAPSULE, COATED, EXTENDED RELEASE ORAL at 08:08

## 2025-07-25 RX ADMIN — IPRATROPIUM BROMIDE AND ALBUTEROL SULFATE 3 ML: .5; 3 SOLUTION RESPIRATORY (INHALATION) at 19:38

## 2025-07-25 ASSESSMENT — ACTIVITIES OF DAILY LIVING (ADL)
ADLS_ACUITY_SCORE: 39

## 2025-07-25 NOTE — PLAN OF CARE
Date/Time: 7/24/25 1820-0252    Trauma/Ortho/Medical (Choose one) Medical     Diagnosis: PNA  POD#: NA  Mental Status: A&Ox4  Activity/dangle: SBA Walker GB  Diet: Regular   Pain: PRN Tylenol   Mena/Voiding: Voiding   Tele/Restraints/Iso: NA  02/LDA: PIV, O2 3L NC  D/C Date: Pending   Other Info: Will Likely Need Home O2

## 2025-07-25 NOTE — PLAN OF CARE
Goal Outcome Evaluation:      Plan of Care Reviewed With: patient    Overall Patient Progress: improvingOverall Patient Progress: improving    Outcome Evaluation: Discharge pending weaning off Oxygen as able.        Patient vital signs are at baseline: Yes  Patient able to ambulate as they were prior to admission or with assist devices provided by therapies during their stay:  Yes  Patient MUST void prior to discharge:  Yes  Patient able to tolerate oral intake:  Yes  Pain has adequate pain control using Oral analgesics:  Yes  Does patient have an identified :  Yes  Has goal D/C date and time been discussed with patient:  No,  Reason:  Discharge pending weaning off Oxygen as able.    Dx: SOB, Pneumonia  POD#n/a    A&Ox4.   CMS Intact.   VSS on O2 @2L via NC.   Up with SBA w/ walker.   Voiding BR.   Right PIV SL.  Regular Diet.  Pain controlled.

## 2025-07-25 NOTE — PROGRESS NOTES
[facility]    Medicine Progress Note - Hospitalist Service    Date of Admission:  7/21/2025    Assessment & Plan     Carolee Doherty is a 84 year old female with a history of bronchiectasis, hypertension, pulmonary fibrosis admitted on 7/21/2025. She presents to the emergency department for evaluation of 5 days increasing cough now with fever of 101 and is found to be hypoxic to 87% at rest on room air.     Sepsis, secondary to bilateral lower lobe pneumonia, community-acquired  Acute hypoxic respiratory failure secondary to bilateral lung pneumonia;  History of pulmonary fibrosis and bronchiectasis  tachycardic at 101, tachypnea >20, hypoxia at 87% at rest, fever of 101.  5 days of increasing cough now with fevers and chills, measured hypoxia.  May have been precipitated by a viral illness as she believes her daughter was visiting and had a respiratory illness last week.   RSV, influenza, COVID-19 testing returned negative  Was on ceftriaxone and doxycycline.  Will switch to oral Ceftin and doxycycline for 3 more days for 1 week course  DuoNebs 4 times daily ordered  CRP checked returned elevated at 15.61.  Procalcitonin normal at 0.07  Blood cultures x 2 are negative so far  Legionella and strep urinary antigens returned negative  Echo results returned normal  Patient with coarse breath sounds added Solu-Medrol 60 mg  daily and increased to twice daily on 7/23/2025 to help with reactive airway disease in the setting of bilateral lung pneumonia  PPI addition while on steroids  Albuterol nebulizer as needed for wheezing  Wean off of oxygen as tolerated  Encourage incentive spirometry as tolerated  Pulmonary consultation requested and are following.  Recommended outpatient follow-up with pulmonary team with PFTs and management of her underlying pulmonary fibrosis secondary to recurrent pneumonias in the past    Pulmonary nodule:  - Due for CT follow-up in October 2025.  Primary care team aware  by recent virtual visit.     Hypertension:  -Resume prior to admission cartia  mg daily.  Reports intolerance with generics.     Adjustment disorder with mixed anxiety and depressed mood:  - Continue prior to admission sertraline 12.5 mg daily              Diet: Combination Diet Regular Diet Adult    DVT Prophylaxis: Enoxaparin (Lovenox) SQ  Mena Catheter: Not present  Lines: None     Cardiac Monitoring: None  Code Status: No CPR- Pre-arrest intubation OK      Clinically Significant Risk Factors                   # Hypertension: Noted on problem list                       Social Drivers of Health    Tobacco Use: Medium Risk (4/28/2025)    Patient History     Smoking Tobacco Use: Former     Smokeless Tobacco Use: Never   Physical Activity: Unknown (11/6/2024)    Exercise Vital Sign     Days of Exercise per Week: 3 days   Social Connections: Unknown (11/6/2024)    Social Connection and Isolation Panel [NHANES]     Frequency of Social Gatherings with Friends and Family: More than three times a week          Disposition Plan       Medically Ready for Discharge: Anticipated Tomorrow      Once hypoxia improves and respiratory status is stable     Most likely will discharge back to home    Quyen Hernandez MD  Hospitalist Service  [facility]  Securely message with [employer] (more info)  Text page via [employer] Paging/Directory   ______________________________________________________________________    Interval History   Chart reviewed.  Discussed with bedside RN  Patient resting comfortably in bed.  Feels slightly better.  Shortness of breath improving.  Still needing 3 L of oxygen by nasal cannula.    No other acute issues    Physical Exam   Vital Signs: Temp: 97.3  F (36.3  C) Temp src: Oral BP: (!) 148/67 Pulse: 78   Resp: 18 SpO2: (!) 91 % O2 Device: None (Room air) Oxygen Delivery: 4 LPM  Weight: 161 lbs 6.03 oz    General Appearance: Pleasant female sitting comfortably in bed alert oriented x 3  not in acute distress  Respiratory: Bilateral crackles heard on auscultation, no wheezing  Cardiovascular: Normal rate rhythm regular  GI: Soft, nontender nondistended bowel sounds positive  Skin: Warm and dry  Other: No lower extremity edema, calm and cooperative    Medical Decision Making       49 MINUTES SPENT BY ME on the date of service doing chart review, history, exam, documentation & further activities per the note.      Data         Imaging results reviewed over the past 24 hrs:   No results found for this or any previous visit (from the past 24 hours).

## 2025-07-25 NOTE — PLAN OF CARE
Goal Outcome Evaluation:      Date/Time: 7/24/2025 7502-0912     Trauma/Ortho/Medical (Choose one) Medical     Diagnosis: Sepsis, Pneumonia  POD#: NA  Mental Status: A/O x4  Activity/dangle: SBA  Diet: regular  Pain: Tylenol given  Mena/Voiding: Continent / BR  Tele/Restraints/Iso: NA  02/LDA: 3 lpm NC/ PIV sl  D/C Date: TBD

## 2025-07-26 VITALS
TEMPERATURE: 97.4 F | SYSTOLIC BLOOD PRESSURE: 133 MMHG | RESPIRATION RATE: 16 BRPM | DIASTOLIC BLOOD PRESSURE: 70 MMHG | HEART RATE: 67 BPM | WEIGHT: 155.7 LBS | OXYGEN SATURATION: 92 % | BODY MASS INDEX: 25.13 KG/M2

## 2025-07-26 PROCEDURE — 250N000013 HC RX MED GY IP 250 OP 250 PS 637: Performed by: INTERNAL MEDICINE

## 2025-07-26 PROCEDURE — 94640 AIRWAY INHALATION TREATMENT: CPT

## 2025-07-26 PROCEDURE — 250N000009 HC RX 250: Performed by: INTERNAL MEDICINE

## 2025-07-26 PROCEDURE — 99239 HOSP IP/OBS DSCHRG MGMT >30: CPT | Performed by: INTERNAL MEDICINE

## 2025-07-26 PROCEDURE — 999N000157 HC STATISTIC RCP TIME EA 10 MIN

## 2025-07-26 PROCEDURE — 250N000011 HC RX IP 250 OP 636: Performed by: INTERNAL MEDICINE

## 2025-07-26 PROCEDURE — 94640 AIRWAY INHALATION TREATMENT: CPT | Mod: 76

## 2025-07-26 PROCEDURE — 250N000012 HC RX MED GY IP 250 OP 636 PS 637: Performed by: INTERNAL MEDICINE

## 2025-07-26 RX ORDER — PREDNISONE 20 MG/1
60 TABLET ORAL DAILY
Qty: 30 TABLET | Refills: 0 | Status: SHIPPED | OUTPATIENT
Start: 2025-07-26

## 2025-07-26 RX ORDER — ALBUTEROL SULFATE 90 UG/1
2 INHALANT RESPIRATORY (INHALATION) EVERY 4 HOURS PRN
Qty: 18 G | Refills: 0 | Status: SHIPPED | OUTPATIENT
Start: 2025-07-26 | End: 2025-08-04

## 2025-07-26 RX ORDER — PREDNISONE 20 MG/1
60 TABLET ORAL DAILY
Status: DISCONTINUED | OUTPATIENT
Start: 2025-07-26 | End: 2025-07-26 | Stop reason: HOSPADM

## 2025-07-26 RX ORDER — DOXYCYCLINE 100 MG/1
100 CAPSULE ORAL EVERY 12 HOURS
Qty: 4 CAPSULE | Refills: 0 | Status: SHIPPED | OUTPATIENT
Start: 2025-07-26 | End: 2025-07-28

## 2025-07-26 RX ORDER — CEFUROXIME AXETIL 500 MG/1
500 TABLET ORAL EVERY 12 HOURS
Qty: 4 TABLET | Refills: 0 | Status: SHIPPED | OUTPATIENT
Start: 2025-07-26 | End: 2025-07-28

## 2025-07-26 RX ORDER — BENZONATATE 100 MG/1
100 CAPSULE ORAL 3 TIMES DAILY PRN
Qty: 50 CAPSULE | Refills: 0 | Status: SHIPPED | OUTPATIENT
Start: 2025-07-26

## 2025-07-26 RX ORDER — GUAIFENESIN 200 MG/10ML
200 LIQUID ORAL EVERY 4 HOURS PRN
Qty: 118 ML | Refills: 0 | Status: SHIPPED | OUTPATIENT
Start: 2025-07-26

## 2025-07-26 RX ORDER — IPRATROPIUM BROMIDE AND ALBUTEROL SULFATE 2.5; .5 MG/3ML; MG/3ML
3 SOLUTION RESPIRATORY (INHALATION) 3 TIMES DAILY
Qty: 90 ML | Refills: 0 | Status: SHIPPED | OUTPATIENT
Start: 2025-07-26

## 2025-07-26 RX ADMIN — PREDNISONE 60 MG: 20 TABLET ORAL at 12:16

## 2025-07-26 RX ADMIN — IPRATROPIUM BROMIDE AND ALBUTEROL SULFATE 3 ML: .5; 3 SOLUTION RESPIRATORY (INHALATION) at 15:13

## 2025-07-26 RX ADMIN — SERTRALINE HYDROCHLORIDE 12.5 MG: 25 TABLET ORAL at 08:40

## 2025-07-26 RX ADMIN — ENOXAPARIN SODIUM 40 MG: 40 INJECTION SUBCUTANEOUS at 12:16

## 2025-07-26 RX ADMIN — DILTIAZEM HYDROCHLORIDE 180 MG: 180 CAPSULE, COATED, EXTENDED RELEASE ORAL at 08:40

## 2025-07-26 RX ADMIN — DOXYCYCLINE HYCLATE 100 MG: 100 CAPSULE ORAL at 08:40

## 2025-07-26 RX ADMIN — IPRATROPIUM BROMIDE AND ALBUTEROL SULFATE 3 ML: .5; 3 SOLUTION RESPIRATORY (INHALATION) at 08:20

## 2025-07-26 RX ADMIN — CEFUROXIME AXETIL 500 MG: 250 TABLET ORAL at 08:40

## 2025-07-26 RX ADMIN — IPRATROPIUM BROMIDE AND ALBUTEROL SULFATE 3 ML: .5; 3 SOLUTION RESPIRATORY (INHALATION) at 11:31

## 2025-07-26 RX ADMIN — PANTOPRAZOLE SODIUM 40 MG: 40 TABLET, DELAYED RELEASE ORAL at 06:44

## 2025-07-26 ASSESSMENT — ACTIVITIES OF DAILY LIVING (ADL)
ADLS_ACUITY_SCORE: 39

## 2025-07-26 NOTE — DISCHARGE SUMMARY
[facility]  Hospitalist Discharge Summary      Date of Admission:  7/21/2025  Date of Discharge:  7/26/2025  Discharging Provider: Quyen Hernandez MD  Discharge Service: Hospitalist Service    Discharge Diagnoses   See below     Clinically Significant Risk Factors          Follow-ups Needed After Discharge   Follow-up Appointments       Follow Up      Follow up with Pulonary team out pt in 2weeks        Hospital Follow-up with Existing Primary Care Provider (PCP)          Schedule Primary Care visit within: 7 Days               Unresulted Labs Ordered in the Past 30 Days of this Admission       Date and Time Order Name Status Description    7/21/2025 11:17 PM Blood Culture Peripheral blood (BC) Arm, Left Preliminary     7/21/2025 11:17 PM Blood Culture Peripheral blood (BC) Arm, Right Preliminary             Discharge Disposition   Discharged to home  Condition at discharge: Stable    Hospital Course     Carolee Doherty is a 84 year old female with a history of bronchiectasis, hypertension, pulmonary fibrosis admitted on 7/21/2025. She presents to the emergency department for evaluation of 5 days increasing cough now with fever of 101 and is found to be hypoxic to 87% at rest on room air.     Sepsis, secondary to bilateral lower lobe pneumonia, community-acquired  Acute hypoxic respiratory failure secondary to bilateral lung pneumonia;  History of pulmonary fibrosis and bronchiectasis  tachycardic at 101, tachypnea >20, hypoxia at 87% at rest, fever of 101.  5 days of increasing cough now with fevers and chills, measured hypoxia.  May have been precipitated by a viral illness as she believes her daughter was visiting and had a respiratory illness last week.   RSV, influenza, COVID-19 testing returned negative  Was on ceftriaxone and doxycycline.  Will switch to oral Ceftin and doxycycline for 3 more days for 1 week course  DuoNebs 4 times daily ordered  CRP checked returned elevated at 15.61.   Procalcitonin normal at 0.07  Blood cultures x 2 are negative so far  Legionella and strep urinary antigens returned negative  Echo results returned normal  Patient with coarse breath sounds added Solu-Medrol 60 mg  daily and increased to twice daily on 7/23/2025 to help with reactive airway disease in the setting of bilateral lung pneumonia. Will discharge with Prednisone taper   PPI addition while on steroids  Albuterol nebulizer as needed for wheezing  Wean off of oxygen as tolerated  Encourage incentive spirometry as tolerated  Pulmonary consultation requested and are following.  Recommended outpatient follow-up with pulmonary team with PFTs and management of her underlying pulmonary fibrosis secondary to recurrent pneumonias in the past    PtS respiratory status and SOB much improved. Still needing 2liters of O2 by NC   Oxygen Documentation  I certify that this patient, Carolee Doherty has been under my care (or a nurse practitioner or physican's assistant working with me). This is the face-to-face encounter for oxygen medical necessity.       At the time of this encounter, I have reviewed the qualifying testing and have determined that supplemental oxygen is reasonable and necessary and is expected to improve the patient's condition in a home setting.        She needs 2LPM of Oxygen by NC      Patient has continued oxygen desaturation due to Pneumonia J18.9.     If portability is ordered, is the patient mobile within the home? yes     Was this visit performed as a telehealth visit: No    Nebulizer Documentation  I attest that I have seen and evaluated Carolee Doherty. She has a diagnosis of J84.9 - Pneumina,  Interstitial pulmonary disease, unspecified and a nebulizer machine is needed to administer medication to improve breathing passages.     I, the undersigned, certify that the above prescribed supplies are medically necessary for this patient and is both reasonable and necessary in reference to accepted  standards of medical and necessary in reference to accepted standards of medical practice in the treatment of this patient's condition and is not prescribed as a convenience.     Quyen Hernandez MD               Pulmonary nodule:  - Due for CT follow-up in October 2025.  Primary care team aware by recent virtual visit.     Hypertension:  -Resume prior to admission cartia  mg daily.  Reports intolerance with generics.     Adjustment disorder with mixed anxiety and depressed mood:  - Continue prior to admission sertraline 12.5 mg daily           Diet: Combination Diet Regular Diet Adult    DVT Prophylaxis: Enoxaparin (Lovenox) SQ  Mena Catheter: Not present  Lines: None     Cardiac Monitoring: None  Code Status: No CPR- Pre-arrest intubation OK       Clinically Significant Risk Factors                   # Hypertension: Noted on problem list                     Consultations This Hospital Stay   PULMONARY IP CONSULT    Code Status   No CPR- Pre-arrest intubation OK    Time Spent on this Encounter   I, Quyen Hernandez MD, personally saw the patient today and spent greater than 30 minutes discharging this patient.       Quyen Hernandez MD  [facility]  [address] [place] [address]  Phone: [phone]  Fax: [phone]  ______________________________________________________________________    Physical Exam   Vital Signs: Temp: 97.4  F (36.3  C) Temp src: Oral BP: 133/70 Pulse: 67   Resp: 16 SpO2: (!) 91 % O2 Device: Nasal cannula Oxygen Delivery: 2 LPM  Weight: 155 lbs 11.2 oz  General Appearance:  Pleasant female sitting comfortably in bed alert oriented x 3 not in acute distress  Respiratory: Bilateral crackles heard on auscultation, no wheezing  Cardiovascular: Normal rate rhythm regular  GI: Soft, nontender nondistended bowel sounds positive  Skin: Warm and dry  Other:  No lower extremity edema, calm and cooperative       Primary Care Physician   Kristian Gupta    Discharge  Orders      Adult Pulmonary Medicine  Referral      Reason for your hospital stay    Pneumonia and acute flare of Pulmonary fibrosis with Bronchiectasis     Activity    Your activity upon discharge: activity as tolerated     Follow Up    Follow up with Pulonary team out pt in 2weeks     Nebulizer and Nebulizer Supplies    Nebulizer Documentation  I attest that I have seen and evaluated Carolee Doherty. She has a diagnosis of J84.9 - Interstitial pulmonary disease, unspecified and a nebulizer machine is needed to administer medication to improve breathing passages.     I, the undersigned, certify that the above prescribed supplies are medically necessary for this patient and is both reasonable and necessary in reference to accepted standards of medical and necessary in reference to accepted standards of medical practice in the treatment of this patient's condition and is not prescribed as a convenience.     Diet    Follow this diet upon discharge: Current Diet:Orders Placed This Encounter      Combination Diet Regular Diet Adult     Hospital Follow-up with Existing Primary Care Provider (PCP)            Significant Results and Procedures   Most Recent 3 CBC's:  Recent Labs   Lab Test 07/24/25  0540 07/21/25 2222 11/06/24  1116 11/01/23  1152   WBC  --  8.4 9.0 12.4*   HGB  --  14.3 14.7 13.6   MCV 87 86 88 89    172 247 452*     Most Recent 3 BMP's:  Recent Labs   Lab Test 07/24/25  0540 07/21/25 2222 11/06/24 1116 11/01/23  1152 07/27/23  0748   NA  --  136 140  --  141   POTASSIUM  --  3.5 3.7  --  3.9   CHLORIDE  --  101 105  --  106   CO2  --  23 23  --  22   BUN  --  15.7 20.0  --  18.7   CR 0.62 0.69 0.73   < > 0.66   ANIONGAP  --  12 12  --  13   YAZAN  --  9.5 9.8  --  9.9   GLC  --  119* 102*  --  106*    < > = values in this interval not displayed.     Most Recent 2 LFT's:  Recent Labs   Lab Test 07/27/23 0748 04/27/22  1400   AST 24 23   ALT 18 27   ALKPHOS 91 106   BILITOTAL 0.6 0.4      Most Recent 3 INR's:No lab results found.  Most Recent INR's and Anticoagulation Dosing History:  Anticoagulation Dose History           No data to display              Most Recent 3 Creatinines:  Recent Labs   Lab Test 07/24/25  0540 07/21/25  2222 11/06/24  1116   CR 0.62 0.69 0.73     Most Recent 3 Hemoglobins:  Recent Labs   Lab Test 07/21/25  2222 11/06/24  1116 11/01/23  1152   HGB 14.3 14.7 13.6     Most Recent 3 Troponin's:No lab results found.  Most Recent 3 BNP's:No lab results found.  Most Recent D-dimer:No lab results found.  Most Recent Cholesterol Panel:  Recent Labs   Lab Test 11/06/24  1116   CHOL 179   *   HDL 62   TRIG 70     7-Day Micro Results       Collected Updated Procedure Result Status      07/22/2025 0845 07/22/2025 1441 Legionella Urinary Antigen and Streptococcus pneumoniae antigen [[id number]]    Urine, Clean Catch    Final result Component Value   Legionella pneumophila serogroup 1 urinary antigen Negative   A negative result does not exclude the possibility of a Legionella infection, as it can be caused by other serogroups and species of Legionella.   Streptococcus pneumoniae antigen Negative   A negative result does not exclude a Streptococcus pneumoniae infection.   Legionella pneumophila Urinary/Strep pneumoniae Antigen Specimen Type Urine            07/21/2025 2340 07/26/2025 0431 Blood Culture Peripheral blood (BC) Arm, Right [[id number]]   Peripheral blood (BC) from Arm, Right    Preliminary result Component Value   Culture No growth after 4 days  [P]                07/21/2025 2340 07/26/2025 0431 Blood Culture Peripheral blood (BC) Arm, Left [[id number]]   Peripheral blood (BC) from Arm, Left    Preliminary result Component Value   Culture No growth after 4 days  [P]                07/21/2025 1957 07/21/2025 2049 Influenza A/B, RSV and SARS-CoV2 PCR (COVID-19) Nasopharyngeal [[id number]]    Swab from Nasopharyngeal    Final result Component Value   Influenza A  PCR Negative   Influenza B PCR Negative   RSV PCR Negative   SARS CoV2 PCR Negative   NEGATIVE: SARS-CoV-2 (COVID-19) RNA not detected, presumed negative.                  Most Recent TSH and T4:  Recent Labs   Lab Test 07/27/23  0748   TSH 1.84     Most Recent Hemoglobin A1c:  Recent Labs   Lab Test 11/06/24  1116   A1C 5.9*     Most Recent 6 glucoses:  Recent Labs   Lab Test 07/21/25  2222 11/06/24  1116 07/27/23  0748 04/27/22  1400 09/09/19  1057 05/03/18  1047   * 102* 106* 96 96 95   ,   Results for orders placed or performed during the hospital encounter of 07/21/25   Chest XR,  PA & LAT    Narrative    EXAM: XR CHEST 2 VIEWS  LOCATION: [facility]  DATE: 7/21/2025    INDICATION: Fever and shortness of breath.  COMPARISON: 10/16/2024.      Impression    IMPRESSION: Coarsened bilateral interstitial opacities, increased from previous examination, favoring a progression of fibrosis and bronchiectatic change, though acute interstitial infiltrates are difficult to exclude. No large focal airspace   consolidation.    No pleural effusion or pneumothorax.    Heart size is normal.   CTA Chest with Contrast    Narrative    EXAM: CTA CHEST WITH CONTRAST  LOCATION: [facility]  DATE: 7/22/2025    INDICATION: Shortness of breath  COMPARISON: CT chest 10/29/2024  TECHNIQUE: CT chest pulmonary angiogram during arterial phase injection of IV contrast. Multiplanar reformats and MIP reconstructions were performed. Dose reduction techniques were used.   CONTRAST: 72 mL Isovue 370    ANGIOGRAM CHEST: Pulmonary arteries are normal caliber and negative for pulmonary emboli. Thoracic aorta is negative for dissection. No CT evidence of right heart strain.    LUNGS AND PLEURA: Mild mosaic attenuation again seen throughout bilateral lung in similar distribution as prior study, nonspecific. Multifocal areas of bronchiectasis/fibrosis redemonstrated in the bilateral lung, most  prominent in the right middle lobe   and lingula. No new confluent airspace consolidation, pleural effusion or pneumothorax. A 9 mm nodule in the posterior left lower lobe on series 6 image 180 is mildly increased in size, previously 9 mm. A 3 mm nodule in the anterior right lower lobe on   image 213 is stable. A 5 mm right lower lobe nodule on series 6 image 185 is new.    MEDIASTINUM/AXILLAE: Mild mediastinal and bilateral hilar lymphadenopathy is difficult to compare with prior noncontrast enhanced study, however appear grossly stable. For example, a mildly prominent right hilar lymph node measures 1.5 x 1.1 cm on series   5 image 82. These lymph nodes are indeterminate but most likely reactive in nature. No axillary or supraclavicular lymphadenopathy. Tiny subcentimeter hypoattenuating nodules are present in the left thyroid. Heart size is normal without pericardial   effusion. A small hiatal hernia is stable.    CORONARY ARTERY CALCIFICATION: None.    UPPER ABDOMEN: No acute findings. A 1.4 cm cyst in the superior pole of the left kidney does not require follow-up. 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    MUSCULOSKELETAL: Mild anterior osteophyte formation in the midthoracic spine. No suspicious osseous lesions or acute fractures.         Impression    IMPRESSION:    1.  No pulmonary embolism or pneumonia.    2.  Redemonstration of multifocal areas of fibrosis and traction bronchiectasis most prominent in the right middle lobe and lingula, with generalized mosaic attenuation of the lung.    3.  Mild mediastinal and bilateral hilar lymphadenopathy, possibly reactive/physiologic.    4.  Scattered bilateral pulmonary nodules, largest in the left lower lobe is mildly increased in size now measuring 9 mm, previously 7 mm. Another 5 mm nodule in the right lower lobe is new. Recommend follow-up chest CT in 3 months applying Lung RADS   v.2022  principles.    5.  Stable small hiatal hernia.     Echocardiogram Complete     Value    LVEF  55%    Narrative    [id number]  [id number]  [id number]^ENEDINA^ZACARIAS^     [facility]  Echocardiography Laboratory  [address] [place] [address]     Name: SHANIA FORREST  MRN: [id number]  : 1940  Study Date: 2025 08:21 AM  Age: 84 yrs  Gender: Female  Patient Location: [facility]  Reason For Study: SOB  Ordering Physician: ZACARIAS MCCONNELL  Referring Physician: Kristian Gupta  Performed By: Maxine Du KADI     BSA: 1.8 m2  Height: 66 in  Weight: 161 lb  HR: 56  BP: 128/70 mmHg  ______________________________________________________________________________  Procedure  Echocardiogram with two-dimensional, color and spectral Doppler. Definity (NDC  #11475-204) given intravenously.  ______________________________________________________________________________  Interpretation Summary     1. The left ventricle is normal in structure, function and size. The visual  ejection fraction is estimated at 55%.  2. The right ventricle is normal in structure, function and size.  3. No valve disease.     No previous echo for comparison.  ______________________________________________________________________________  Left Ventricle  The left ventricle is normal in structure, function and size. There is normal  left ventricular wall thickness. The visual ejection fraction is estimated at  55%. Left ventricular diastolic function is indeterminate. Normal left  ventricular wall motion.     Right Ventricle  The right ventricle is normal in structure, function and size.     Atria  The left atrium is borderline dilated. Right atrial size is normal. There is  no atrial shunt seen.     Mitral Valve  There is mild (1+) mitral regurgitation.     Tricuspid Valve  There is mild (1+) tricuspid regurgitation.     Aortic Valve  The aortic valve is normal in structure and function.     Pulmonic Valve  The  pulmonic valve is normal in structure and function.     Vessels  Normal ascending, transverse (arch), and descending aorta. The inferior vena  cava was normal in size with preserved respiratory variability.     Pericardium  There is no pericardial effusion.     Rhythm  Sinus rhythm was noted.  ______________________________________________________________________________  MMode/2D Measurements & Calculations  IVSd: 1.1 cm     LVIDd: 4.4 cm  LVIDs: 2.3 cm  LVPWd: 0.80 cm  FS: 46.9 %  LV mass(C)d: 137.8 grams  LV mass(C)dI: 75.5 grams/m2  Ao root diam: 3.0 cm  asc Aorta Diam: 2.9 cm  LVOT diam: 1.8 cm  LVOT area: 2.5 cm2  Ao root diam index Ht(cm/m): 1.8  Ao root diam index BSA (cm/m2): 1.6  Asc Ao diam index BSA (cm/m2): 1.6  Asc Ao diam index Ht(cm/m): 1.7  LA Volume (BP): 61.5 ml     LA Volume Index (BP): 33.8 ml/m2  RWT: 0.36  TAPSE: 2.6 cm     Doppler Measurements & Calculations  MV E max giuseppe: 65.5 cm/sec  MV A max giuseppe: 90.7 cm/sec  MV E/A: 0.72  MV dec time: 0.17 sec  LV V1 max P.8 mmHg  LV V1 max: 110.0 cm/sec  LV V1 VTI: 24.2 cm  SV(LVOT): 60.4 ml  SI(LVOT): 33.1 ml/m2  PA acc time: 0.15 sec  E/E' av.2  Lateral E/e': 10.9  Medial E/e': 7.4  RV S Giuseppe: 15.3 cm/sec     ______________________________________________________________________________  Report approved by: Johny Baxter MD on 2025 10:50 AM             Discharge Medications      Review of your medicines        START taking        Dose / Directions   albuterol 108 (90 Base) MCG/ACT inhaler  Commonly known as: PROAIR HFA/PROVENTIL HFA/VENTOLIN HFA      Dose: 2 puff  Inhale 2 puffs into the lungs every 4 hours as needed for shortness of breath, wheezing or cough.  Quantity: 18 g  Refills: 0     benzonatate 100 MG capsule  Commonly known as: TESSALON      Dose: 100 mg  Take 1 capsule (100 mg) by mouth 3 times daily as needed for cough.  Quantity: 50 capsule  Refills: 0     cefuroxime 500 MG tablet  Commonly known as: CEFTIN  Indication:  Community Acquired Pneumonia      Dose: 500 mg  Take 1 tablet (500 mg) by mouth every 12 hours for 2 days.  Quantity: 4 tablet  Refills: 0     doxycycline hyclate 100 MG capsule  Commonly known as: VIBRAMYCIN  Indication: Community Acquired Pneumonia      Dose: 100 mg  Take 1 capsule (100 mg) by mouth every 12 hours for 2 days.  Quantity: 4 capsule  Refills: 0     guaiFENesin 20 mg/mL liquid  Commonly known as: ROBITUSSIN      Dose: 200 mg  Take 10 mLs (200 mg) by mouth every 4 hours as needed for other (secretion expectorant).  Quantity: 118 mL  Refills: 0     ipratropium - albuterol 0.5 mg/2.5 mg (3mg)/3 mL 0.5-2.5 (3) MG/3ML neb solution  Commonly known as: DUONEB      Dose: 3 mL  Take 1 vial (3 mLs) by nebulization 3 times daily.  Quantity: 90 mL  Refills: 0     predniSONE 20 MG tablet  Commonly known as: DELTASONE      Dose: 60 mg  Take 3 tablets (60 mg) by mouth daily. 60mg po daily for 3days then reduce 40mg po daily for 3days then 20mg Po daily for 4days then 10mg PO daily for 3days then stop  Quantity: 30 tablet  Refills: 0            CONTINUE these medicines which have NOT CHANGED        Dose / Directions   acetaminophen 325 MG tablet  Commonly known as: TYLENOL      Dose: 325-650 mg  Take 325-650 mg by mouth every 6 hours as needed for mild pain.  Refills: 0     diltiazem ER COATED BEADS 180 MG 24 hr capsule  Commonly known as: CARDIZEM CD/CARTIA XT  Used for: Benign essential hypertension      Dose: 180 mg  Take 1 capsule (180 mg) by mouth daily.  Quantity: 90 capsule  Refills: 3     multivitamin w/minerals tablet      Dose: 1 tablet  Take 1 tablet by mouth daily.  Refills: 0     sertraline 25 MG tablet  Commonly known as: ZOLOFT  Used for: Adjustment disorder with mixed anxiety and depressed mood      Dose: 12.5 mg  Take 0.5 tablets (12.5 mg) by mouth daily  Quantity: 45 tablet  Refills: 3               Where to get your medicines        These medications were sent to [facility] [place], [place]  - [address] [address]  [address] [facility] [place] [address]      Phone: [phone]   albuterol 108 (90 Base) MCG/ACT inhaler  benzonatate 100 MG capsule  cefuroxime 500 MG tablet  doxycycline hyclate 100 MG capsule  guaiFENesin 20 mg/mL liquid  ipratropium - albuterol 0.5 mg/2.5 mg (3mg)/3 mL 0.5-2.5 (3) MG/3ML neb solution  predniSONE 20 MG tablet       Allergies   Allergies   Allergen Reactions    Amlodipine Besylate Swelling    Levaquin      Strange thoughts    Vivi Ds [Sulfamethoxazole W-Trimethoprim]      Felt lousy

## 2025-07-26 NOTE — PLAN OF CARE
Goal Outcome Evaluation:      Plan of Care Reviewed With: patient    Overall Patient Progress: improvingOverall Patient Progress: improving    Outcome Evaluation: Discharge to Home 07/26/2025 w/ Family during Evening Shift.        Patient vital signs are at baseline: Yes  Patient able to ambulate as they were prior to admission or with assist devices provided by therapies during their stay:  Yes  Patient MUST void prior to discharge:  Yes  Patient able to tolerate oral intake:  Yes  Pain has adequate pain control using Oral analgesics:  Yes  Does patient have an identified :  Yes  Has goal D/C date and time been discussed with patient:  Yes    Dx:SOB, Pneumonia, Sepsis  POD#n/a    A&Ox4 w/ Forgetfulness.   CMS Intact.   VSS on O2 @ 2L via NC.  Home Oxygen set up and family has equipment.   Up with SBA w/ GB and walker.   Voiding in BR.   Right PIV SL.  Regular Diet.  Pain controlled.

## 2025-07-26 NOTE — PLAN OF CARE
Discharge to home. Will follow up with PCP as scheduled. Discharge education/medications complete. Transport arranged with daughter.

## 2025-07-26 NOTE — PROGRESS NOTES
Patient has been assessed for Home Oxygen needs.     Pulse oximetry (SpO2) and Oxygen flow readings:    SpO2 =   87   % on room air at rest while awake.    SpO2 improved to   93   % on   2   liters/minute at rest.    SpO2 =   87   % on room air during activity/with exercise.    *SpO2 improved to   91   % on   2   liters/minute during activity/with exercise.

## 2025-07-26 NOTE — PROGRESS NOTES
Oxygen Documentation  I certify that this patient, Carolee Doherty has been under my care (or a nurse practitioner or physican's assistant working with me). This is the face-to-face encounter for oxygen medical necessity.      At the time of this encounter, I have reviewed the qualifying testing and have determined that supplemental oxygen is reasonable and necessary and is expected to improve the patient's condition in a home setting.         Patient has continued oxygen desaturation due to Pneumonia J18.9.    If portability is ordered, is the patient mobile within the home? yes    Was this visit performed as a telehealth visit: Lina Hernandez MD

## 2025-07-26 NOTE — PROGRESS NOTES
[phone]  Diagnosis: SOB, Pneumonia  Mental Status: AOX4  Activity/Dangle: SBA w/ walker  Diet: Regular  Pain: Denied  Mena/Voiding: Up to BR  Tele/Restraints/ISO: none  O2/LDA: 3L NC  DC Date: anticipated 7/26/25  Other Information: VSS except for HTN.

## 2025-07-27 DIAGNOSIS — F43.23 ADJUSTMENT DISORDER WITH MIXED ANXIETY AND DEPRESSED MOOD: ICD-10-CM

## 2025-07-27 LAB
BACTERIA SPEC CULT: NO GROWTH
BACTERIA SPEC CULT: NO GROWTH

## 2025-07-28 ENCOUNTER — PATIENT OUTREACH (OUTPATIENT)
Dept: CARE COORDINATION | Facility: CLINIC | Age: 85
End: 2025-07-28
Payer: COMMERCIAL

## 2025-07-28 RX ORDER — SERTRALINE HYDROCHLORIDE 25 MG/1
12.5 TABLET, FILM COATED ORAL DAILY
Qty: 45 TABLET | Refills: 3 | Status: SHIPPED | OUTPATIENT
Start: 2025-07-28

## 2025-07-28 NOTE — PROGRESS NOTES
"  [facility]: Transitions of Care Outreach  Chief Complaint   Patient presents with    Clinic Care Coordination - Post Hospital       Most Recent Admission Date: 7/21/2025   Most Recent Admission Diagnosis: Hypoxia - R09.02  Community acquired pneumonia, unspecified laterality - J18.9     Most Recent Discharge Date: 7/26/2025   Most Recent Discharge Diagnosis: Community acquired pneumonia, unspecified laterality - J18.9  Hypoxia - R09.02     Transitions of Care Assessment    Discharge Assessment  How are you doing now that you are home?: \" I am doing a lot better, glad to be home \"  How are your symptoms? (Red Flag symptoms escalate to triage hotline per guidelines): Improved  Do you know how to contact your clinic care team if you have future questions or changes to your health status? : Yes  Does the patient have their discharge instructions? : Yes  Does the patient have questions regarding their discharge instructions? : No  Were you started on any new medications or were there changes to any of your previous medications? : Yes  Does the patient have all of their medications?: Yes  Do you have questions regarding any of your medications? : No  Do you have all of your needed medical supplies or equipment (DME)?  (i.e. oxygen tank, CPAP, cane, etc.): Yes    Post-op ([facility] Only)  If the patient had a surgery or procedure, do they have any questions for a nurse?: No         [facility] Explained and offered Care Coordination support to eligible patients: Yes    Patient accepted? No    Follow up Plan     Discharge Follow-Up  Discharge follow up appointment scheduled in alignment with recommended follow up timeframe or Transitions of Risk Category? (Low = within 30 days; Moderate= within 14 days; High= within 7 days): Yes  Discharge Follow Up Appointment Date: 08/04/25  Discharge Follow Up Appointment Scheduled with?: Primary Care Provider    Future Appointments   Date Time Provider Department Center "   8/4/2025  9:00 AM Kristian Gupta MD [facility]       Outpatient Plan as outlined on AVS reviewed with patient.    For any urgent concerns, please contact our 24 hour nurse triage line: [phone] ([phone])       NANETTE Woodard

## 2025-08-04 ENCOUNTER — OFFICE VISIT (OUTPATIENT)
Dept: FAMILY MEDICINE | Facility: CLINIC | Age: 85
End: 2025-08-04

## 2025-08-04 VITALS
HEIGHT: 66 IN | WEIGHT: 158.4 LBS | RESPIRATION RATE: 16 BRPM | BODY MASS INDEX: 25.46 KG/M2 | DIASTOLIC BLOOD PRESSURE: 72 MMHG | OXYGEN SATURATION: 94 % | HEART RATE: 82 BPM | TEMPERATURE: 98.1 F | SYSTOLIC BLOOD PRESSURE: 136 MMHG

## 2025-08-04 DIAGNOSIS — J44.89 ASTHMA-COPD OVERLAP SYNDROME (H): ICD-10-CM

## 2025-08-04 DIAGNOSIS — R91.8 PULMONARY NODULES: ICD-10-CM

## 2025-08-04 DIAGNOSIS — J84.10 PULMONARY FIBROSIS (H): ICD-10-CM

## 2025-08-04 DIAGNOSIS — J18.9 PNEUMONIA OF RIGHT LOWER LOBE DUE TO INFECTIOUS ORGANISM: Primary | ICD-10-CM

## 2025-08-04 DIAGNOSIS — I10 BENIGN ESSENTIAL HYPERTENSION: ICD-10-CM

## 2025-08-04 PROCEDURE — 99495 TRANSJ CARE MGMT MOD F2F 14D: CPT | Performed by: INTERNAL MEDICINE

## 2025-08-04 PROCEDURE — 1126F AMNT PAIN NOTED NONE PRSNT: CPT | Performed by: INTERNAL MEDICINE

## 2025-08-04 PROCEDURE — 3078F DIAST BP <80 MM HG: CPT | Performed by: INTERNAL MEDICINE

## 2025-08-04 PROCEDURE — 3075F SYST BP GE 130 - 139MM HG: CPT | Performed by: INTERNAL MEDICINE

## 2025-08-04 PROCEDURE — 1111F DSCHRG MED/CURRENT MED MERGE: CPT | Performed by: INTERNAL MEDICINE

## 2025-08-04 RX ORDER — BUDESONIDE AND FORMOTEROL FUMARATE DIHYDRATE 160; 4.5 UG/1; UG/1
AEROSOL RESPIRATORY (INHALATION)
Qty: 10.2 G | Refills: 11 | Status: SHIPPED | OUTPATIENT
Start: 2025-08-04

## 2025-08-04 ASSESSMENT — PAIN SCALES - GENERAL: PAINLEVEL_OUTOF10: NO PAIN (0)

## 2025-08-05 ENCOUNTER — TELEPHONE (OUTPATIENT)
Dept: PULMONOLOGY | Facility: CLINIC | Age: 85
End: 2025-08-05
Payer: COMMERCIAL

## 2025-08-11 ENCOUNTER — OFFICE VISIT (OUTPATIENT)
Dept: PULMONOLOGY | Facility: CLINIC | Age: 85
End: 2025-08-11
Attending: INTERNAL MEDICINE
Payer: COMMERCIAL

## 2025-08-11 VITALS
BODY MASS INDEX: 25.23 KG/M2 | OXYGEN SATURATION: 94 % | SYSTOLIC BLOOD PRESSURE: 138 MMHG | HEIGHT: 66 IN | WEIGHT: 157 LBS | DIASTOLIC BLOOD PRESSURE: 74 MMHG | HEART RATE: 98 BPM

## 2025-08-11 DIAGNOSIS — J84.10 PULMONARY FIBROSIS (H): ICD-10-CM

## 2025-08-11 DIAGNOSIS — J18.9 COMMUNITY ACQUIRED PNEUMONIA, UNSPECIFIED LATERALITY: ICD-10-CM

## 2025-08-11 DIAGNOSIS — R09.02 HYPOXIA: ICD-10-CM

## 2025-08-11 LAB
DLCOCOR-%PRED-PRE: 69 %
DLCOCOR-PRE: 13.26 ML/MIN/MMHG
DLCOUNC-%PRED-PRE: 70 %
DLCOUNC-PRE: 13.46 ML/MIN/MMHG
DLCOUNC-PRED: 19.06 ML/MIN/MMHG
ERV-%PRED-PRE: 42 %
ERV-PRE: 0.3 L
ERV-PRED: 0.69 L
EXPTIME-PRE: 5.39 SEC
FEF2575-%PRED-PRE: 142 %
FEF2575-PRE: 2.08 L/SEC
FEF2575-PRED: 1.46 L/SEC
FEFMAX-%PRED-PRE: 125 %
FEFMAX-PRE: 6.04 L/SEC
FEFMAX-PRED: 4.81 L/SEC
FEV1-%PRED-PRE: 82 %
FEV1-PRE: 1.69 L
FEV1FEV6-PRE: 85 %
FEV1FEV6-PRED: 77 %
FEV1FVC-PRE: 84 %
FEV1FVC-PRED: 77 %
FEV1SVC-PRE: 81 L
FEV1SVC-PRED: 74 L
FIFMAX-PRE: 2.9 L/SEC
FRCPLETH-%PRED-PRE: 79 %
FRCPLETH-PRE: 2.45 L
FRCPLETH-PRED: 3.09 L
FVC-%PRED-PRE: 73 %
FVC-PRE: 2 L
FVC-PRED: 2.71 L
GAW-PRED: 1.03 L/S/CMH2O
IC-%PRED-PRE: 79 %
IC-PRE: 1.62 L
IC-PRED: 2.03 L
Lab: 109 %
RVPLETH-%PRED-PRE: 85 %
RVPLETH-PRE: 1.99 L
RVPLETH-PRED: 2.34 L
SGAW-PRED: 0.2 1/CMH2O*S
SRAW-PRED: < 4.76 CMH2O*S
TLCPLETH-%PRED-PRE: 78 %
TLCPLETH-PRE: 4.07 L
TLCPLETH-PRED: 5.2 L
VA-%PRED-PRE: 80 %
VA-PRE: 3.79 L
VC-%PRED-PRE: 75 %
VC-PRE: 2.08 L
VC-PRED: 2.76 L

## 2025-08-11 PROCEDURE — G0463 HOSPITAL OUTPT CLINIC VISIT: HCPCS | Performed by: INTERNAL MEDICINE

## 2025-08-11 PROCEDURE — 94375 RESPIRATORY FLOW VOLUME LOOP: CPT | Performed by: INTERNAL MEDICINE

## 2025-08-11 PROCEDURE — 94726 PLETHYSMOGRAPHY LUNG VOLUMES: CPT | Performed by: INTERNAL MEDICINE

## 2025-08-11 PROCEDURE — 99215 OFFICE O/P EST HI 40 MIN: CPT | Performed by: INTERNAL MEDICINE

## 2025-08-11 PROCEDURE — 94150 VITAL CAPACITY TEST: CPT | Performed by: INTERNAL MEDICINE

## 2025-08-11 PROCEDURE — 3075F SYST BP GE 130 - 139MM HG: CPT | Performed by: INTERNAL MEDICINE

## 2025-08-11 PROCEDURE — 94729 DIFFUSING CAPACITY: CPT | Performed by: INTERNAL MEDICINE

## 2025-08-11 PROCEDURE — 1126F AMNT PAIN NOTED NONE PRSNT: CPT | Performed by: INTERNAL MEDICINE

## 2025-08-11 PROCEDURE — 3078F DIAST BP <80 MM HG: CPT | Performed by: INTERNAL MEDICINE

## 2025-08-11 ASSESSMENT — PAIN SCALES - GENERAL: PAINLEVEL_OUTOF10: NO PAIN (0)

## (undated) RX ORDER — ONDANSETRON 2 MG/ML
INJECTION INTRAMUSCULAR; INTRAVENOUS
Status: DISPENSED
Start: 2017-04-04

## (undated) RX ORDER — FENTANYL CITRATE 50 UG/ML
INJECTION, SOLUTION INTRAMUSCULAR; INTRAVENOUS
Status: DISPENSED
Start: 2017-04-04